# Patient Record
Sex: MALE | Race: WHITE | NOT HISPANIC OR LATINO | Employment: UNEMPLOYED | URBAN - METROPOLITAN AREA
[De-identification: names, ages, dates, MRNs, and addresses within clinical notes are randomized per-mention and may not be internally consistent; named-entity substitution may affect disease eponyms.]

---

## 2017-11-23 ENCOUNTER — HOSPITAL ENCOUNTER (EMERGENCY)
Facility: HOSPITAL | Age: 42
Discharge: HOME | End: 2017-11-23
Attending: EMERGENCY MEDICINE | Admitting: EMERGENCY MEDICINE

## 2017-11-23 VITALS
HEART RATE: 79 BPM | DIASTOLIC BLOOD PRESSURE: 80 MMHG | SYSTOLIC BLOOD PRESSURE: 106 MMHG | BODY MASS INDEX: 25.11 KG/M2 | TEMPERATURE: 97.7 F | RESPIRATION RATE: 20 BRPM | WEIGHT: 160 LBS | OXYGEN SATURATION: 96 % | HEIGHT: 67 IN

## 2017-11-23 DIAGNOSIS — F19.10 IV DRUG ABUSE (HCC): ICD-10-CM

## 2017-11-23 DIAGNOSIS — M79.89 SWELLING OF LEFT HAND: Primary | ICD-10-CM

## 2017-11-23 DIAGNOSIS — L03.114 CELLULITIS OF LEFT HAND: ICD-10-CM

## 2017-11-23 LAB
ALBUMIN SERPL-MCNC: 4.4 G/DL (ref 3.2–4.8)
ALBUMIN/GLOB SERPL: 1.3 G/DL (ref 1.5–2.5)
ALP SERPL-CCNC: 146 U/L (ref 25–100)
ALT SERPL W P-5'-P-CCNC: 106 U/L (ref 7–40)
ANION GAP SERPL CALCULATED.3IONS-SCNC: -2 MMOL/L (ref 3–11)
AST SERPL-CCNC: 67 U/L (ref 0–33)
BASOPHILS # BLD AUTO: 0.04 10*3/MM3 (ref 0–0.2)
BASOPHILS NFR BLD AUTO: 0.4 % (ref 0–1)
BILIRUB SERPL-MCNC: 0.3 MG/DL (ref 0.3–1.2)
BUN BLD-MCNC: 11 MG/DL (ref 9–23)
BUN/CREAT SERPL: 13.8 (ref 7–25)
CALCIUM SPEC-SCNC: 9.4 MG/DL (ref 8.7–10.4)
CHLORIDE SERPL-SCNC: 107 MMOL/L (ref 99–109)
CO2 SERPL-SCNC: 31 MMOL/L (ref 20–31)
CREAT BLD-MCNC: 0.8 MG/DL (ref 0.6–1.3)
DEPRECATED RDW RBC AUTO: 42.8 FL (ref 37–54)
EOSINOPHIL # BLD AUTO: 0.39 10*3/MM3 (ref 0–0.3)
EOSINOPHIL NFR BLD AUTO: 3.9 % (ref 0–3)
ERYTHROCYTE [DISTWIDTH] IN BLOOD BY AUTOMATED COUNT: 13.3 % (ref 11.3–14.5)
GFR SERPL CREATININE-BSD FRML MDRD: 106 ML/MIN/1.73
GLOBULIN UR ELPH-MCNC: 3.5 GM/DL
GLUCOSE BLD-MCNC: 83 MG/DL (ref 70–100)
HCT VFR BLD AUTO: 44.5 % (ref 38.9–50.9)
HGB BLD-MCNC: 15.8 G/DL (ref 13.1–17.5)
IMM GRANULOCYTES # BLD: 0.03 10*3/MM3 (ref 0–0.03)
IMM GRANULOCYTES NFR BLD: 0.3 % (ref 0–0.6)
LYMPHOCYTES # BLD AUTO: 2.58 10*3/MM3 (ref 0.6–4.8)
LYMPHOCYTES NFR BLD AUTO: 25.5 % (ref 24–44)
MCH RBC QN AUTO: 31.3 PG (ref 27–31)
MCHC RBC AUTO-ENTMCNC: 35.5 G/DL (ref 32–36)
MCV RBC AUTO: 88.3 FL (ref 80–99)
MONOCYTES # BLD AUTO: 1.42 10*3/MM3 (ref 0–1)
MONOCYTES NFR BLD AUTO: 14.1 % (ref 0–12)
NEUTROPHILS # BLD AUTO: 5.64 10*3/MM3 (ref 1.5–8.3)
NEUTROPHILS NFR BLD AUTO: 55.8 % (ref 41–71)
PLATELET # BLD AUTO: 272 10*3/MM3 (ref 150–450)
PMV BLD AUTO: 9.3 FL (ref 6–12)
POTASSIUM BLD-SCNC: 3.8 MMOL/L (ref 3.5–5.5)
PROT SERPL-MCNC: 7.9 G/DL (ref 5.7–8.2)
RBC # BLD AUTO: 5.04 10*6/MM3 (ref 4.2–5.76)
SODIUM BLD-SCNC: 136 MMOL/L (ref 132–146)
WBC NRBC COR # BLD: 10.1 10*3/MM3 (ref 3.5–10.8)

## 2017-11-23 PROCEDURE — 87040 BLOOD CULTURE FOR BACTERIA: CPT | Performed by: EMERGENCY MEDICINE

## 2017-11-23 PROCEDURE — 96366 THER/PROPH/DIAG IV INF ADDON: CPT

## 2017-11-23 PROCEDURE — 25010000002 TDAP 5-2.5-18.5 LF-MCG/0.5 SUSPENSION: Performed by: NURSE PRACTITIONER

## 2017-11-23 PROCEDURE — 85025 COMPLETE CBC W/AUTO DIFF WBC: CPT | Performed by: EMERGENCY MEDICINE

## 2017-11-23 PROCEDURE — 96365 THER/PROPH/DIAG IV INF INIT: CPT

## 2017-11-23 PROCEDURE — 99284 EMERGENCY DEPT VISIT MOD MDM: CPT

## 2017-11-23 PROCEDURE — 96375 TX/PRO/DX INJ NEW DRUG ADDON: CPT

## 2017-11-23 PROCEDURE — 90471 IMMUNIZATION ADMIN: CPT | Performed by: NURSE PRACTITIONER

## 2017-11-23 PROCEDURE — 80053 COMPREHEN METABOLIC PANEL: CPT | Performed by: EMERGENCY MEDICINE

## 2017-11-23 PROCEDURE — 25010000002 VANCOMYCIN 10 G RECONSTITUTED SOLUTION: Performed by: EMERGENCY MEDICINE

## 2017-11-23 PROCEDURE — 90715 TDAP VACCINE 7 YRS/> IM: CPT | Performed by: NURSE PRACTITIONER

## 2017-11-23 PROCEDURE — 25010000002 CEFTRIAXONE PER 250 MG: Performed by: EMERGENCY MEDICINE

## 2017-11-23 RX ORDER — METHADONE HYDROCHLORIDE 10 MG/1
10 TABLET ORAL EVERY 6 HOURS PRN
COMMUNITY
End: 2023-03-06

## 2017-11-23 RX ORDER — SODIUM CHLORIDE 0.9 % (FLUSH) 0.9 %
10 SYRINGE (ML) INJECTION AS NEEDED
Status: DISCONTINUED | OUTPATIENT
Start: 2017-11-23 | End: 2017-11-23 | Stop reason: HOSPADM

## 2017-11-23 RX ORDER — SULFAMETHOXAZOLE AND TRIMETHOPRIM 800; 160 MG/1; MG/1
2 TABLET ORAL 2 TIMES DAILY
Qty: 40 TABLET | Refills: 0 | Status: SHIPPED | OUTPATIENT
Start: 2017-11-23 | End: 2023-03-06

## 2017-11-23 RX ORDER — CEFTRIAXONE SODIUM 1 G/50ML
1 INJECTION, SOLUTION INTRAVENOUS ONCE
Status: COMPLETED | OUTPATIENT
Start: 2017-11-23 | End: 2017-11-23

## 2017-11-23 RX ORDER — CEPHALEXIN 500 MG/1
500 CAPSULE ORAL 4 TIMES DAILY
Qty: 40 CAPSULE | Refills: 0 | Status: SHIPPED | OUTPATIENT
Start: 2017-11-23 | End: 2023-03-06

## 2017-11-23 RX ORDER — VANCOMYCIN/0.9 % SOD CHLORIDE 1.5G/250ML
20 PLASTIC BAG, INJECTION (ML) INTRAVENOUS ONCE
Status: COMPLETED | OUTPATIENT
Start: 2017-11-23 | End: 2017-11-23

## 2017-11-23 RX ADMIN — VANCOMYCIN HYDROCHLORIDE 1500 MG: 10 INJECTION, POWDER, LYOPHILIZED, FOR SOLUTION INTRAVENOUS at 18:40

## 2017-11-23 RX ADMIN — TETANUS TOXOID, REDUCED DIPHTHERIA TOXOID AND ACELLULAR PERTUSSIS VACCINE, ADSORBED 0.5 ML: 5; 2.5; 8; 8; 2.5 SUSPENSION INTRAMUSCULAR at 18:44

## 2017-11-23 RX ADMIN — CEFTRIAXONE SODIUM 1 G: 1 INJECTION, SOLUTION INTRAVENOUS at 20:33

## 2017-11-23 NOTE — ED PROVIDER NOTES
Subjective   HPI Comments: Left hand redness and swelling for 2 days. Burnt his left FA on a car but also does IV drugs with most recent being heroin last week.    No cp or soa. No fever.    Patient is a 42 y.o. male presenting with upper extremity pain.   Upper Extremity Issue   Location:  Arm  Arm location:  L arm  Pain details:     Duration:  2 days    Timing:  Constant    Progression:  Worsening  Tetanus status:  Out of date  Prior injury to area:  No  Relieved by:  Nothing  Worsened by:  Nothing  Ineffective treatments:  None tried  Associated symptoms: swelling    Associated symptoms: no decreased range of motion and no fever        Review of Systems   Constitutional: Negative for fever.   All other systems reviewed and are negative.      Past Medical History:   Diagnosis Date   • Hepatitis C    • Hodgkin's lymphoma        Allergies   Allergen Reactions   • Penicillins Hives       Past Surgical History:   Procedure Laterality Date   • CHOLECYSTECTOMY         History reviewed. No pertinent family history.    Social History     Social History   • Marital status:      Spouse name: N/A   • Number of children: N/A   • Years of education: N/A     Social History Main Topics   • Smoking status: Heavy Tobacco Smoker     Packs/day: 1.50   • Smokeless tobacco: Never Used   • Alcohol use No   • Drug use: No   • Sexual activity: Not Asked     Other Topics Concern   • None     Social History Narrative   • None           Objective   Physical Exam   Constitutional: He is oriented to person, place, and time. He appears well-developed and well-nourished.   HENT:   Head: Normocephalic and atraumatic.   Right Ear: External ear normal.   Left Ear: External ear normal.   Nose: Nose normal.   Mouth/Throat: Oropharynx is clear and moist.   Eyes: Conjunctivae and EOM are normal. Pupils are equal, round, and reactive to light.   Neck: Normal range of motion. Neck supple.   Cardiovascular: Normal rate, regular rhythm, normal  heart sounds and intact distal pulses.    Pulmonary/Chest: Effort normal and breath sounds normal.   Abdominal: Soft. Bowel sounds are normal.   Musculoskeletal: Normal range of motion.        Left hand: He exhibits tenderness. He exhibits normal range of motion.   Slight left hand swelling with full rom. No pain with movement. Has an abrasion to her left FA as well as several puncture wounds to her left AC and wrist cw IVDU.   Neurological: He is alert and oriented to person, place, and time.   Skin: Skin is warm and dry.   Psychiatric: He has a normal mood and affect. His behavior is normal. Judgment normal.       Procedures         ED Course  ED Course   Comment By Time   Well aware of the ss of worsening condition. All thankful and agreeable. Advised to stop doing drugs. LELA Leslie 11/23 2018                  [facility]    Final diagnoses:   Swelling of left hand   Cellulitis of left hand   IV drug abuse            LELA Leslie  11/23/17 2023

## 2017-11-24 NOTE — DISCHARGE INSTRUCTIONS
Follow up with one of the [facility] physician groups below to setup primary care. If you have trouble following up, please call Ginger Ramos, our transitional care nurse, at [phone].    (Dr. Celestin, Dr. Patterson, Dr. Brennan, and Dr. Salmon.)  [facility], Primary Care, [phone], [address], [place] [address]    [facility], Primary Care, [phone], [address], [address] [place], [address] [facility]) [facility], Primary Care, [phone], [address] [place], [address] [facility], Primary Care, [phone], [address] [place], [address]     [place] [facility], Primary Care, [phone], [address] [place], [address]    [facility], Primary Care, [phone], [address]    [place], [address] [facility], Primary Care, [phone], [address], [address] [place], [address] [facility], Primary Care, [phone], [address] [place], [address] [facility]) [facility], Primary Care, [phone], [address], [address] [place], [address] [facility], Primary Care, [phone], [address], [address] [place], [address] [facility], Primary Care, [phone], [address], [place], [address]     [facility], Primary Care, [phone], [address] [place] [address]    Follow up with one of the physician centers below to setup primary care.    [facility], [phone],  [address], [address], [place], [facility]    [facility], [phone], [address] [facility], [address] [place] [address], [phone], [address] [place], [facility];     [facility], [phone], [address] [place] [address]

## 2017-11-28 LAB
BACTERIA SPEC AEROBE CULT: NORMAL
BACTERIA SPEC AEROBE CULT: NORMAL

## 2022-12-27 ENCOUNTER — APPOINTMENT (OUTPATIENT)
Dept: GENERAL RADIOLOGY | Facility: HOSPITAL | Age: 47
End: 2022-12-27

## 2022-12-27 ENCOUNTER — HOSPITAL ENCOUNTER (EMERGENCY)
Facility: HOSPITAL | Age: 47
Discharge: HOME | End: 2022-12-27
Attending: EMERGENCY MEDICINE | Admitting: EMERGENCY MEDICINE

## 2022-12-27 VITALS
RESPIRATION RATE: 20 BRPM | OXYGEN SATURATION: 97 % | BODY MASS INDEX: 24.44 KG/M2 | HEIGHT: 69 IN | WEIGHT: 165 LBS | TEMPERATURE: 98.1 F | SYSTOLIC BLOOD PRESSURE: 90 MMHG | HEART RATE: 90 BPM | DIASTOLIC BLOOD PRESSURE: 67 MMHG

## 2022-12-27 DIAGNOSIS — R07.9 CHEST PAIN, UNSPECIFIED TYPE: Primary | ICD-10-CM

## 2022-12-27 LAB
ALBUMIN SERPL-MCNC: 3.5 G/DL (ref 3.5–5.2)
ALBUMIN/GLOB SERPL: 0.9 G/DL
ALP SERPL-CCNC: 119 U/L (ref 39–117)
ALT SERPL W P-5'-P-CCNC: 158 U/L (ref 1–41)
ANION GAP SERPL CALCULATED.3IONS-SCNC: 9 MMOL/L (ref 5–15)
AST SERPL-CCNC: 123 U/L (ref 1–40)
BASOPHILS # BLD AUTO: 0.1 10*3/MM3 (ref 0–0.2)
BASOPHILS NFR BLD AUTO: 1.1 % (ref 0–1.5)
BILIRUB SERPL-MCNC: 0.2 MG/DL (ref 0–1.2)
BUN SERPL-MCNC: 12 MG/DL (ref 6–20)
BUN/CREAT SERPL: 19.4 (ref 7–25)
CALCIUM SPEC-SCNC: 9.3 MG/DL (ref 8.6–10.5)
CHLORIDE SERPL-SCNC: 105 MMOL/L (ref 98–107)
CO2 SERPL-SCNC: 24 MMOL/L (ref 22–29)
CREAT SERPL-MCNC: 0.62 MG/DL (ref 0.76–1.27)
D DIMER PPP FEU-MCNC: <0.27 MCGFEU/ML (ref 0–0.5)
DEPRECATED RDW RBC AUTO: 39.6 FL (ref 37–54)
EGFRCR SERPLBLD CKD-EPI 2021: 118.6 ML/MIN/1.73
EOSINOPHIL # BLD AUTO: 0.89 10*3/MM3 (ref 0–0.4)
EOSINOPHIL NFR BLD AUTO: 9.6 % (ref 0.3–6.2)
ERYTHROCYTE [DISTWIDTH] IN BLOOD BY AUTOMATED COUNT: 12.2 % (ref 12.3–15.4)
GLOBULIN UR ELPH-MCNC: 3.9 GM/DL
GLUCOSE SERPL-MCNC: 119 MG/DL (ref 65–99)
HCT VFR BLD AUTO: 39.6 % (ref 37.5–51)
HGB BLD-MCNC: 12.6 G/DL (ref 13–17.7)
HOLD SPECIMEN: NORMAL
IMM GRANULOCYTES # BLD AUTO: 0.05 10*3/MM3 (ref 0–0.05)
IMM GRANULOCYTES NFR BLD AUTO: 0.5 % (ref 0–0.5)
LIPASE SERPL-CCNC: 24 U/L (ref 13–60)
LYMPHOCYTES # BLD AUTO: 2.75 10*3/MM3 (ref 0.7–3.1)
LYMPHOCYTES NFR BLD AUTO: 29.6 % (ref 19.6–45.3)
MCH RBC QN AUTO: 28.2 PG (ref 26.6–33)
MCHC RBC AUTO-ENTMCNC: 31.8 G/DL (ref 31.5–35.7)
MCV RBC AUTO: 88.6 FL (ref 79–97)
MONOCYTES # BLD AUTO: 1.01 10*3/MM3 (ref 0.1–0.9)
MONOCYTES NFR BLD AUTO: 10.9 % (ref 5–12)
NEUTROPHILS NFR BLD AUTO: 4.49 10*3/MM3 (ref 1.7–7)
NEUTROPHILS NFR BLD AUTO: 48.3 % (ref 42.7–76)
NRBC BLD AUTO-RTO: 0 /100 WBC (ref 0–0.2)
NT-PROBNP SERPL-MCNC: 32.7 PG/ML (ref 0–450)
PLATELET # BLD AUTO: 367 10*3/MM3 (ref 140–450)
PMV BLD AUTO: 8.8 FL (ref 6–12)
POTASSIUM SERPL-SCNC: 4.6 MMOL/L (ref 3.5–5.2)
PROT SERPL-MCNC: 7.4 G/DL (ref 6–8.5)
QT INTERVAL: 368 MS
QT INTERVAL: 410 MS
QTC INTERVAL: 442 MS
QTC INTERVAL: 461 MS
RBC # BLD AUTO: 4.47 10*6/MM3 (ref 4.14–5.8)
SODIUM SERPL-SCNC: 138 MMOL/L (ref 136–145)
TROPONIN T SERPL-MCNC: <0.01 NG/ML (ref 0–0.03)
TROPONIN T SERPL-MCNC: <0.01 NG/ML (ref 0–0.03)
WBC NRBC COR # BLD: 9.29 10*3/MM3 (ref 3.4–10.8)
WHOLE BLOOD HOLD COAG: NORMAL
WHOLE BLOOD HOLD SPECIMEN: NORMAL

## 2022-12-27 PROCEDURE — 83690 ASSAY OF LIPASE: CPT | Performed by: EMERGENCY MEDICINE

## 2022-12-27 PROCEDURE — 85025 COMPLETE CBC W/AUTO DIFF WBC: CPT | Performed by: EMERGENCY MEDICINE

## 2022-12-27 PROCEDURE — 85379 FIBRIN DEGRADATION QUANT: CPT | Performed by: EMERGENCY MEDICINE

## 2022-12-27 PROCEDURE — 83880 ASSAY OF NATRIURETIC PEPTIDE: CPT | Performed by: EMERGENCY MEDICINE

## 2022-12-27 PROCEDURE — 71045 X-RAY EXAM CHEST 1 VIEW: CPT

## 2022-12-27 PROCEDURE — 84484 ASSAY OF TROPONIN QUANT: CPT | Performed by: EMERGENCY MEDICINE

## 2022-12-27 PROCEDURE — 99284 EMERGENCY DEPT VISIT MOD MDM: CPT

## 2022-12-27 PROCEDURE — 36415 COLL VENOUS BLD VENIPUNCTURE: CPT

## 2022-12-27 PROCEDURE — 80053 COMPREHEN METABOLIC PANEL: CPT | Performed by: EMERGENCY MEDICINE

## 2022-12-27 PROCEDURE — 93005 ELECTROCARDIOGRAM TRACING: CPT | Performed by: EMERGENCY MEDICINE

## 2022-12-27 RX ORDER — BUPRENORPHINE HYDROCHLORIDE AND NALOXONE HYDROCHLORIDE DIHYDRATE 8; 2 MG/1; MG/1
1 TABLET SUBLINGUAL DAILY
COMMUNITY
End: 2023-03-06

## 2022-12-27 RX ORDER — SODIUM CHLORIDE 0.9 % (FLUSH) 0.9 %
10 SYRINGE (ML) INJECTION AS NEEDED
Status: DISCONTINUED | OUTPATIENT
Start: 2022-12-27 | End: 2022-12-27 | Stop reason: HOSPADM

## 2022-12-27 RX ADMIN — SODIUM CHLORIDE 1000 ML: 9 INJECTION, SOLUTION INTRAVENOUS at 13:57

## 2022-12-27 NOTE — ED PROVIDER NOTES
"Subjective   History of Present Illness  47-year-old male presents for evaluation of chest pain and shortness of breath.  Of note, the patient has a history of hypertension, hyperlipidemia, and is a smoker.  He is currently homeless but states that he has a safe place to stay.  He notes that he had a clean heart cath at an outside facility within the last 3 months.  Since yesterday he has been experiencing intermittent episodes of chest pain and shortness of breath.  He describes the chest pain as a \"tightness.\"  He denies any accompanying cough or fever.  No nausea or vomiting.  No diaphoresis.  He received aspirin and nitroglycerin from EMS prior to arrival.  He denies any active chest pain at this time.  He is currently requesting something to eat.        Review of Systems   Respiratory: Positive for shortness of breath.    Cardiovascular: Positive for chest pain.   All other systems reviewed and are negative.      Past Medical History:   Diagnosis Date   • Hepatitis C    • Hodgkin's lymphoma (HCC)        Allergies   Allergen Reactions   • Penicillins Hives       Past Surgical History:   Procedure Laterality Date   • CHOLECYSTECTOMY         History reviewed. No pertinent family history.    Social History     Socioeconomic History   • Marital status:    Tobacco Use   • Smoking status: Heavy Smoker     Packs/day: 1.50     Types: Cigarettes   • Smokeless tobacco: Never   Substance and Sexual Activity   • Alcohol use: No   • Drug use: No           Objective   Physical Exam  Vitals and nursing note reviewed.   Constitutional:       General: He is not in acute distress.     Appearance: He is well-developed. He is not diaphoretic.      Comments: Nontoxic-appearing male   HENT:      Head: Normocephalic and atraumatic.   Neck:      Vascular: No JVD.   Cardiovascular:      Rate and Rhythm: Normal rate and regular rhythm.      Heart sounds: Normal heart sounds. No murmur heard.    No friction rub. No gallop. "   Pulmonary:      Effort: Pulmonary effort is normal. No respiratory distress.      Breath sounds: Normal breath sounds. No wheezing or rales.   Abdominal:      General: Bowel sounds are normal. There is no distension.      Palpations: Abdomen is soft. There is no mass.      Tenderness: There is no abdominal tenderness. There is no guarding.      Comments: No focal abdominal tenderness, no peritoneal signs, no pain out of proportion to exam   Musculoskeletal:         General: Normal range of motion.      Cervical back: Normal range of motion.      Right lower leg: No edema.      Left lower leg: No edema.   Skin:     General: Skin is warm and dry.      Coloration: Skin is not pale.      Findings: No erythema or rash.   Neurological:      Mental Status: He is alert and oriented to person, place, and time.   Psychiatric:         Thought Content: Thought content normal.         Judgment: Judgment normal.         Procedures           ED Course  ED Course as of 12/27/22 1650   Tue Dec 27, 2022   1143 47-year-old male presents for evaluation of chest pain and shortness of breath.  Of note, the patient has a history of hypertension, hyperlipidemia and smoking.  He notes that he had a clean heart cath at an outside facility within the last 3 months.  He is currently homeless.  Since yesterday has been experiencing intermittent episodes of chest pain and shortness of breath, prompting his visit to the emergency department via EMS today.  He received aspirin and nitroglycerin from EMS prior to arrival.  In the ED, the patient is nontoxic-appearing.  Benign exam.  We will obtain labs and imaging, and we will reassess following initial interventions. [DD]   1150 Initial EKG revealed normal sinus rhythm with a heart rate of 87 and no ST segments suggestive of or concerning for ischemia. [DD]   1309 HEART score of 3. [DD]   1405 Low risk Well's and D-dimer is negative. [DD]   1435 Upon reevaluation, the patient looks and feels  well.  Repeat troponin/EKG negative/unchanged.  Doubt ACS, PE, dissection, or emergent cardiothoracic process at this time based on exam, history, clinical presentation, gestalt, objective findings in the ED, and risk stratification.  HEART score of 3.  The patient will follow up with the chest pain clinic within the next 72 hours for further outpatient work-up and evaluation.  Agreeable with plan and given appropriate strict return precautions.     [DD]      ED Course User Index  [DD] Pancho Monroe MD                                       Recent Results (from the past 24 hour(s))   ECG 12 Lead Chest Pain    Collection Time: 12/27/22 11:45 AM   Result Value Ref Range    QT Interval 368 ms    QTC Interval 442 ms   Comprehensive Metabolic Panel    Collection Time: 12/27/22 12:02 PM    Specimen: Blood   Result Value Ref Range    Glucose 119 (H) 65 - 99 mg/dL    BUN 12 6 - 20 mg/dL    Creatinine 0.62 (L) 0.76 - 1.27 mg/dL    Sodium 138 136 - 145 mmol/L    Potassium 4.6 3.5 - 5.2 mmol/L    Chloride 105 98 - 107 mmol/L    CO2 24.0 22.0 - 29.0 mmol/L    Calcium 9.3 8.6 - 10.5 mg/dL    Total Protein 7.4 6.0 - 8.5 g/dL    Albumin 3.5 3.5 - 5.2 g/dL    ALT (SGPT) 158 (H) 1 - 41 U/L    AST (SGOT) 123 (H) 1 - 40 U/L    Alkaline Phosphatase 119 (H) 39 - 117 U/L    Total Bilirubin 0.2 0.0 - 1.2 mg/dL    Globulin 3.9 gm/dL    A/G Ratio 0.9 g/dL    BUN/Creatinine Ratio 19.4 7.0 - 25.0    Anion Gap 9.0 5.0 - 15.0 mmol/L    eGFR 118.6 >60.0 mL/min/1.73   Lipase    Collection Time: 12/27/22 12:02 PM    Specimen: Blood   Result Value Ref Range    Lipase 24 13 - 60 U/L   BNP    Collection Time: 12/27/22 12:02 PM    Specimen: Blood   Result Value Ref Range    proBNP 32.7 0.0 - 450.0 pg/mL   D-dimer, Quantitative    Collection Time: 12/27/22 12:02 PM    Specimen: Blood   Result Value Ref Range    D-Dimer, Quantitative <0.27 0.00 - 0.50 MCGFEU/mL   Troponin    Collection Time: 12/27/22 12:02 PM    Specimen: Blood   Result Value Ref  Range    Troponin T <0.010 0.000 - 0.030 ng/mL   CBC Auto Differential    Collection Time: 12/27/22 12:02 PM    Specimen: Blood   Result Value Ref Range    WBC 9.29 3.40 - 10.80 10*3/mm3    RBC 4.47 4.14 - 5.80 10*6/mm3    Hemoglobin 12.6 (L) 13.0 - 17.7 g/dL    Hematocrit 39.6 37.5 - 51.0 %    MCV 88.6 79.0 - 97.0 fL    MCH 28.2 26.6 - 33.0 pg    MCHC 31.8 31.5 - 35.7 g/dL    RDW 12.2 (L) 12.3 - 15.4 %    RDW-SD 39.6 37.0 - 54.0 fl    MPV 8.8 6.0 - 12.0 fL    Platelets 367 140 - 450 10*3/mm3    Neutrophil % 48.3 42.7 - 76.0 %    Lymphocyte % 29.6 19.6 - 45.3 %    Monocyte % 10.9 5.0 - 12.0 %    Eosinophil % 9.6 (H) 0.3 - 6.2 %    Basophil % 1.1 0.0 - 1.5 %    Immature Grans % 0.5 0.0 - 0.5 %    Neutrophils, Absolute 4.49 1.70 - 7.00 10*3/mm3    Lymphocytes, Absolute 2.75 0.70 - 3.10 10*3/mm3    Monocytes, Absolute 1.01 (H) 0.10 - 0.90 10*3/mm3    Eosinophils, Absolute 0.89 (H) 0.00 - 0.40 10*3/mm3    Basophils, Absolute 0.10 0.00 - 0.20 10*3/mm3    Immature Grans, Absolute 0.05 0.00 - 0.05 10*3/mm3    nRBC 0.0 0.0 - 0.2 /100 WBC   Green Top (Gel)    Collection Time: 12/27/22 12:02 PM   Result Value Ref Range    Extra Tube Hold for add-ons.    Lavender Top    Collection Time: 12/27/22 12:02 PM   Result Value Ref Range    Extra Tube hold for add-on    Gold Top - SST    Collection Time: 12/27/22 12:02 PM   Result Value Ref Range    Extra Tube Hold for add-ons.    Gray Top    Collection Time: 12/27/22 12:02 PM   Result Value Ref Range    Extra Tube Hold for add-ons.    Light Blue Top    Collection Time: 12/27/22 12:02 PM   Result Value Ref Range    Extra Tube Hold for add-ons.    ECG 12 Lead Chest Pain    Collection Time: 12/27/22  2:05 PM   Result Value Ref Range    QT Interval 410 ms    QTC Interval 461 ms   Troponin    Collection Time: 12/27/22  2:06 PM    Specimen: Blood   Result Value Ref Range    Troponin T <0.010 0.000 - 0.030 ng/mL     Note: In addition to lab results from this visit, the labs listed above may  include labs taken at another facility or during a different encounter within the last 24 hours. Please correlate lab times with ED admission and discharge times for further clarification of the services performed during this visit.    XR Chest 1 View   Final Result   IMPRESSION :    Increased patchy opacity in the retrocardiac aspect of the left base   which could represent atelectasis or pneumonia[       This report was finalized on 12/27/2022 12:51 PM by Harrison Donis.            Vitals:    12/27/22 1159 12/27/22 1219 12/27/22 1241 12/27/22 1300   BP: 93/55 93/62 96/56 90/67   Patient Position:       Pulse: 94 89 84 90   Resp:       Temp:       TempSrc:       SpO2: 98% 97% 98% 97%   Weight:       Height:         Medications   sodium chloride 0.9 % flush 10 mL (has no administration in time range)   sodium chloride 0.9 % flush 10 mL (has no administration in time range)   sodium chloride 0.9 % bolus 1,000 mL (0 mL Intravenous Stopped 12/27/22 1454)     ECG/EMG Results (last 24 hours)     ** No results found for the last 24 hours. **        ECG 12 Lead Chest Pain   Final Result   Test Reason : Chest Pain   Blood Pressure :   */*   mmHG   Vent. Rate :  76 BPM     Atrial Rate :  76 BPM      P-R Int : 136 ms          QRS Dur :  96 ms       QT Int : 410 ms       P-R-T Axes :  63  39  43 degrees      QTc Int : 461 ms      Normal sinus rhythm   Voltage criteria for left ventricular hypertrophy   Abnormal ECG   When compared with ECG of 27-DEC-2022 11:45, (Unconfirmed)   No significant change was found   Confirmed by MD Monroe Michael ([id number]) on 12/27/2022 2:55:37 PM      Referred By: FERNANDO           Confirmed By: Holden Monroe MD      ECG 12 Lead Chest Pain   Final Result   Test Reason : Chest Pain   Blood Pressure :   */*   mmHG   Vent. Rate :  87 BPM     Atrial Rate :  87 BPM      P-R Int : 130 ms          QRS Dur :  88 ms       QT Int : 368 ms       P-R-T Axes :  67  57  58 degrees      QTc Int : 442 ms      Normal  sinus rhythm   Possible Left atrial enlargement   Left ventricular hypertrophy   Abnormal ECG   No previous ECGs available   Confirmed by MD Monroe Michael ([id number]) on 12/27/2022 2:55:06 PM      Referred By: EDMD           Confirmed By: Holden Monroe MD      ECG 12 Lead Chest Pain    (Results Pending)              MDM    Final diagnoses:   Chest pain, unspecified type       ED Disposition  ED Disposition     ED Disposition   Discharge    Condition   Stable    Comment   --             [facility]  [address]  [facility] [address] [phone]  In 3 days           Medication List      No changes were made to your prescriptions during this visit.          Pancho Monroe MD  12/27/22 9427

## 2022-12-29 ENCOUNTER — PATIENT OUTREACH (OUTPATIENT)
Dept: CASE MANAGEMENT | Facility: OTHER | Age: 47
End: 2022-12-29

## 2022-12-29 NOTE — OUTREACH NOTE
AMBULATORY CASE MANAGEMENT NOTE    Name and Relationship of Patient/Support Person: Jayne Stover - Emergency Contact    Patient Outreach    Second attempt made to reach patient by phone, since his ED visit 12/27/22 with chest pain, shortness of breath.  Wife answered the only phone number listed for patient. Explained role of RN-ACM, and contact info.  Said she was out with her kids currently,  was not with her.  Asked her to give patient RN-ACM phone number and have him call back.  Wife agreed.     JAY JAY THIBODEAUX  Ambulatory Case Management    12/29/2022, 16:38 EST

## 2023-06-12 ENCOUNTER — HOSPITAL ENCOUNTER (EMERGENCY)
Facility: HOSPITAL | Age: 48
Discharge: HOME | End: 2023-06-12
Attending: EMERGENCY MEDICINE | Admitting: EMERGENCY MEDICINE
Payer: COMMERCIAL

## 2023-06-12 ENCOUNTER — APPOINTMENT (OUTPATIENT)
Dept: GENERAL RADIOLOGY | Facility: HOSPITAL | Age: 48
End: 2023-06-12
Payer: COMMERCIAL

## 2023-06-12 VITALS
TEMPERATURE: 99.3 F | HEART RATE: 68 BPM | BODY MASS INDEX: 28.25 KG/M2 | HEIGHT: 67 IN | DIASTOLIC BLOOD PRESSURE: 74 MMHG | OXYGEN SATURATION: 95 % | RESPIRATION RATE: 18 BRPM | SYSTOLIC BLOOD PRESSURE: 104 MMHG | WEIGHT: 180 LBS

## 2023-06-12 DIAGNOSIS — R07.9 CHEST PAIN, UNSPECIFIED TYPE: Primary | ICD-10-CM

## 2023-06-12 DIAGNOSIS — Z72.0 TOBACCO USE: ICD-10-CM

## 2023-06-12 LAB
ALBUMIN SERPL-MCNC: 4 G/DL (ref 3.5–5.2)
ALBUMIN/GLOB SERPL: 1.4 G/DL
ALP SERPL-CCNC: 77 U/L (ref 39–117)
ALT SERPL W P-5'-P-CCNC: 14 U/L (ref 1–41)
ANION GAP SERPL CALCULATED.3IONS-SCNC: 9 MMOL/L (ref 5–15)
AST SERPL-CCNC: 21 U/L (ref 1–40)
BASOPHILS # BLD AUTO: 0.05 10*3/MM3 (ref 0–0.2)
BASOPHILS NFR BLD AUTO: 0.8 % (ref 0–1.5)
BILIRUB SERPL-MCNC: 0.2 MG/DL (ref 0–1.2)
BUN SERPL-MCNC: 9 MG/DL (ref 6–20)
BUN/CREAT SERPL: 9.9 (ref 7–25)
CALCIUM SPEC-SCNC: 9.5 MG/DL (ref 8.6–10.5)
CHLORIDE SERPL-SCNC: 105 MMOL/L (ref 98–107)
CO2 SERPL-SCNC: 26 MMOL/L (ref 22–29)
CREAT SERPL-MCNC: 0.91 MG/DL (ref 0.76–1.27)
DEPRECATED RDW RBC AUTO: 38.4 FL (ref 37–54)
EGFRCR SERPLBLD CKD-EPI 2021: 104 ML/MIN/1.73
EOSINOPHIL # BLD AUTO: 0.35 10*3/MM3 (ref 0–0.4)
EOSINOPHIL NFR BLD AUTO: 5.6 % (ref 0.3–6.2)
ERYTHROCYTE [DISTWIDTH] IN BLOOD BY AUTOMATED COUNT: 12.1 % (ref 12.3–15.4)
GEN 5 2HR TROPONIN T REFLEX: <6 NG/L
GLOBULIN UR ELPH-MCNC: 2.9 GM/DL
GLUCOSE SERPL-MCNC: 102 MG/DL (ref 65–99)
HCT VFR BLD AUTO: 39.2 % (ref 37.5–51)
HGB BLD-MCNC: 13.2 G/DL (ref 13–17.7)
HOLD SPECIMEN: NORMAL
IMM GRANULOCYTES # BLD AUTO: 0.01 10*3/MM3 (ref 0–0.05)
IMM GRANULOCYTES NFR BLD AUTO: 0.2 % (ref 0–0.5)
LIPASE SERPL-CCNC: 23 U/L (ref 13–60)
LYMPHOCYTES # BLD AUTO: 2.15 10*3/MM3 (ref 0.7–3.1)
LYMPHOCYTES NFR BLD AUTO: 34.3 % (ref 19.6–45.3)
MCH RBC QN AUTO: 29.1 PG (ref 26.6–33)
MCHC RBC AUTO-ENTMCNC: 33.7 G/DL (ref 31.5–35.7)
MCV RBC AUTO: 86.3 FL (ref 79–97)
MONOCYTES # BLD AUTO: 0.63 10*3/MM3 (ref 0.1–0.9)
MONOCYTES NFR BLD AUTO: 10.1 % (ref 5–12)
NEUTROPHILS NFR BLD AUTO: 3.07 10*3/MM3 (ref 1.7–7)
NEUTROPHILS NFR BLD AUTO: 49 % (ref 42.7–76)
NRBC BLD AUTO-RTO: 0 /100 WBC (ref 0–0.2)
NT-PROBNP SERPL-MCNC: 38.6 PG/ML (ref 0–450)
PLATELET # BLD AUTO: 237 10*3/MM3 (ref 140–450)
PMV BLD AUTO: 9.6 FL (ref 6–12)
POTASSIUM SERPL-SCNC: 4.4 MMOL/L (ref 3.5–5.2)
PROT SERPL-MCNC: 6.9 G/DL (ref 6–8.5)
QT INTERVAL: 390 MS
QT INTERVAL: 420 MS
QTC INTERVAL: 456 MS
QTC INTERVAL: 458 MS
RBC # BLD AUTO: 4.54 10*6/MM3 (ref 4.14–5.8)
SODIUM SERPL-SCNC: 140 MMOL/L (ref 136–145)
TROPONIN T DELTA: NORMAL
TROPONIN T SERPL HS-MCNC: <6 NG/L
WBC NRBC COR # BLD: 6.26 10*3/MM3 (ref 3.4–10.8)
WHOLE BLOOD HOLD COAG: NORMAL
WHOLE BLOOD HOLD SPECIMEN: NORMAL

## 2023-06-12 PROCEDURE — 99284 EMERGENCY DEPT VISIT MOD MDM: CPT

## 2023-06-12 PROCEDURE — 84484 ASSAY OF TROPONIN QUANT: CPT | Performed by: EMERGENCY MEDICINE

## 2023-06-12 PROCEDURE — 93005 ELECTROCARDIOGRAM TRACING: CPT | Performed by: EMERGENCY MEDICINE

## 2023-06-12 PROCEDURE — 83880 ASSAY OF NATRIURETIC PEPTIDE: CPT | Performed by: EMERGENCY MEDICINE

## 2023-06-12 PROCEDURE — 80053 COMPREHEN METABOLIC PANEL: CPT | Performed by: EMERGENCY MEDICINE

## 2023-06-12 PROCEDURE — 83690 ASSAY OF LIPASE: CPT | Performed by: EMERGENCY MEDICINE

## 2023-06-12 PROCEDURE — 85025 COMPLETE CBC W/AUTO DIFF WBC: CPT | Performed by: EMERGENCY MEDICINE

## 2023-06-12 PROCEDURE — 36415 COLL VENOUS BLD VENIPUNCTURE: CPT

## 2023-06-12 PROCEDURE — 71045 X-RAY EXAM CHEST 1 VIEW: CPT

## 2023-06-12 RX ORDER — SODIUM CHLORIDE 0.9 % (FLUSH) 0.9 %
10 SYRINGE (ML) INJECTION AS NEEDED
Status: DISCONTINUED | OUTPATIENT
Start: 2023-06-12 | End: 2023-06-12 | Stop reason: HOSPADM

## 2023-06-12 RX ORDER — ACETAMINOPHEN 500 MG
1000 TABLET ORAL EVERY 6 HOURS PRN
Qty: 30 TABLET | Refills: 0 | Status: SHIPPED | OUTPATIENT
Start: 2023-06-12

## 2023-06-12 RX ORDER — ALBUTEROL SULFATE 90 UG/1
2 AEROSOL, METERED RESPIRATORY (INHALATION) EVERY 4 HOURS PRN
Qty: 18 G | Refills: 0 | Status: SHIPPED | OUTPATIENT
Start: 2023-06-12

## 2023-06-12 RX ORDER — ASPIRIN 81 MG/1
324 TABLET, CHEWABLE ORAL ONCE
Status: DISCONTINUED | OUTPATIENT
Start: 2023-06-12 | End: 2023-06-12 | Stop reason: HOSPADM

## 2023-06-12 NOTE — ED PROVIDER NOTES
Subjective   History of Present Illness  Patient is a 48-year-old male who awoke around 6 AM this morning with substernal chest discomfort and pain.  He does report some tingling in the right arm.  Patient reports a history of similar and states he was evaluated here in January for the same.  Patient received aspirin and nitroglycerin prior to arrival.  Patient has no cardiac history personally but does report a family history.  No other significant past medical history.  Patient has a past history of Hodgkin's lymphoma as well as hepatitis C.    History provided by:  Patient and EMS personnel    Review of Systems    Past Medical History:   Diagnosis Date    Hepatitis C     Hodgkin's lymphoma        Allergies   Allergen Reactions    Penicillins Hives and Unknown (See Comments)       Past Surgical History:   Procedure Laterality Date    CHOLECYSTECTOMY         History reviewed. No pertinent family history.    Social History     Socioeconomic History    Marital status:    Tobacco Use    Smoking status: Some Days     Packs/day: 0.50     Types: Cigarettes    Smokeless tobacco: Never   Vaping Use    Vaping Use: Never used   Substance and Sexual Activity    Alcohol use: No    Drug use: No    Sexual activity: Defer           Objective   Physical Exam  Vitals and nursing note reviewed.   Constitutional:       General: He is not in acute distress.     Appearance: He is well-developed. He is not toxic-appearing.   HENT:      Head: Normocephalic.   Cardiovascular:      Rate and Rhythm: Normal rate and regular rhythm.      Pulses:           Radial pulses are 2+ on the right side and 2+ on the left side.   Pulmonary:      Effort: Pulmonary effort is normal. No tachypnea.      Breath sounds: Normal breath sounds. No decreased breath sounds.   Abdominal:      Palpations: Abdomen is soft.   Musculoskeletal:         General: Normal range of motion.      Cervical back: Normal range of motion.      Right lower leg: No edema.       Left lower leg: No edema.   Skin:     General: Skin is warm and dry.   Neurological:      Mental Status: He is alert and oriented to person, place, and time.   Psychiatric:         Mood and Affect: Mood normal.         Behavior: Behavior normal.       Procedures           ED Course  ED Course as of 06/12/23 1426   Mon Jun 12, 2023   0906 Chart review demonstrates the patient was seen here for chest pain in December 2022.  Negative work-up at that time.  Patient's chart today and and report is negative for significant past medical history, but the patient reported history of hypertension, hyperlipidemia, and smoking in December.  Patient reported here that he had a negative cardiac catheterization in January.  At the December visit, the patient reported that he had had a negative cardiac catheterization about 3 months prior.  There is a visit to the hospital at [facility] in August of last year though the details and data are very vague. [RS]   0951 HS Troponin T: <6  Negative troponin. [RS]   0951 XR Chest 1 View  Personally reviewed the single view the chest and on my interpretation demonstrates no lobar infiltrate or emergent finding.  See report for radiology for details. [RS]   1209 Patient with negative cardiac enzymes x2 with nonischemic EKGs.  We will plan to discharge the patient home to follow-up as an outpatient for further evaluation and management.  Patient states that he had a normal cardiac catheterization without any occlusive disease in the not too distant past.  Thus, acute coronary syndrome is highly unlikely. I had a discussion with the patient/family regarding diagnosis, diagnostic results, treatment plan, and medications.  The patient/family indicated understanding of these instructions.  I spent adequate time at the bedside prior to discharge necessary to discuss the aftercare instructions, giving patient education, providing explanations of the results of our evaluations/findings,  and my decision making to assure that the patient/family understand the plan of care.  Time was allotted to answer questions at that time and throughout the ED course.  Emphasis was placed on timely follow-up after discharge.  I also discussed the potential for the development of an acute emergent condition requiring further evaluation, return to the ER, admission, or even surgical intervention. I discussed that we found nothing during the visit today indicating the need for further ER workup at this time, admission to the hospital, or the presence of an acute unstable medical condition.  I encouraged the patient to return to the emergency department immediately for ANY concerns, worsening, new complaints, or if symptoms persist and unable to seek follow-up in a timely fashion.  The patient/family expressed understanding and agreement with this plan.  [RS]      ED Course User Index  [RS] Ant Pizano MD                      HEART Score: 2                      Medical Decision Making  Problems Addressed:  Chest pain, unspecified type: complicated acute illness or injury  Tobacco use: complicated acute illness or injury    Amount and/or Complexity of Data Reviewed  Independent Historian: EMS  External Data Reviewed: labs, ECG and notes.  Labs: ordered. Decision-making details documented in ED Course.  Radiology: ordered. Decision-making details documented in ED Course.  ECG/medicine tests: ordered.    Risk  OTC drugs.  Prescription drug management.        Final diagnoses:   Chest pain, unspecified type   Tobacco use       ED Disposition  ED Disposition       ED Disposition   Discharge    Condition   Stable    Comment   --               [facility] Emergency Department  [address] [address] [phone]    As needed, If symptoms worsen or ANY concerns.    PATIENT CONNECTION - [place] [address]  [phone]  Schedule an appointment as soon as  possible for a visit            Medication List        New Prescriptions      acetaminophen 500 MG tablet  Commonly known as: TYLENOL  Take 2 tablets by mouth Every 6 (Six) Hours As Needed for Mild Pain or Moderate Pain.     albuterol sulfate  (90 Base) MCG/ACT inhaler  Commonly known as: PROVENTIL HFA;VENTOLIN HFA;PROAIR HFA  Inhale 2 puffs Every 4 (Four) Hours As Needed for Wheezing or Shortness of Air.            Stop      azithromycin 250 MG tablet  Commonly known as: Zithromax     promethazine-dextromethorphan 6.25-15 MG/5ML syrup  Commonly known as: PROMETHAZINE-DM               Where to Get Your Medications        These medications were sent to [id number] - [place], [place] - [address] [place] [facility] [address] [place] - [phone]  - [phone] FX  [address], [facility] [address]      Phone: [phone]   acetaminophen 500 MG tablet  albuterol sulfate  (90 Base) MCG/ACT inhaler            Ant Pizano MD  06/12/23 2934

## 2024-03-28 ENCOUNTER — LAB (OUTPATIENT)
Dept: INTERNAL MEDICINE | Facility: CLINIC | Age: 49
End: 2024-03-28
Payer: COMMERCIAL

## 2024-03-28 ENCOUNTER — OFFICE VISIT (OUTPATIENT)
Dept: INTERNAL MEDICINE | Facility: CLINIC | Age: 49
End: 2024-03-28
Payer: COMMERCIAL

## 2024-03-28 VITALS
HEIGHT: 65 IN | HEART RATE: 78 BPM | SYSTOLIC BLOOD PRESSURE: 122 MMHG | TEMPERATURE: 97.1 F | OXYGEN SATURATION: 97 % | BODY MASS INDEX: 30.79 KG/M2 | DIASTOLIC BLOOD PRESSURE: 80 MMHG | WEIGHT: 184.8 LBS

## 2024-03-28 DIAGNOSIS — J02.9 SORE THROAT: ICD-10-CM

## 2024-03-28 DIAGNOSIS — F32.A DEPRESSION, UNSPECIFIED DEPRESSION TYPE: ICD-10-CM

## 2024-03-28 DIAGNOSIS — I10 HYPERTENSION, UNSPECIFIED TYPE: ICD-10-CM

## 2024-03-28 DIAGNOSIS — Z11.59 NEED FOR HEPATITIS C SCREENING TEST: ICD-10-CM

## 2024-03-28 DIAGNOSIS — G47.9 DIFFICULTY SLEEPING: ICD-10-CM

## 2024-03-28 DIAGNOSIS — J30.2 SEASONAL ALLERGIES: ICD-10-CM

## 2024-03-28 DIAGNOSIS — R43.0 LOSS OF SMELL: ICD-10-CM

## 2024-03-28 DIAGNOSIS — R09.89 CHEST CONGESTION: ICD-10-CM

## 2024-03-28 DIAGNOSIS — R52 BODY ACHES: Primary | ICD-10-CM

## 2024-03-28 DIAGNOSIS — E78.5 DYSLIPIDEMIA: ICD-10-CM

## 2024-03-28 DIAGNOSIS — J32.9 SINUSITIS, UNSPECIFIED CHRONICITY, UNSPECIFIED LOCATION: ICD-10-CM

## 2024-03-28 LAB
EXPIRATION DATE: NORMAL
EXPIRATION DATE: NORMAL
FLUAV AG UPPER RESP QL IA.RAPID: NOT DETECTED
FLUBV AG UPPER RESP QL IA.RAPID: NOT DETECTED
INTERNAL CONTROL: NORMAL
INTERNAL CONTROL: NORMAL
Lab: NORMAL
Lab: NORMAL
S PYO AG THROAT QL: NEGATIVE
SARS-COV-2 AG UPPER RESP QL IA.RAPID: NOT DETECTED

## 2024-03-28 PROCEDURE — 86803 HEPATITIS C AB TEST: CPT | Performed by: STUDENT IN AN ORGANIZED HEALTH CARE EDUCATION/TRAINING PROGRAM

## 2024-03-28 PROCEDURE — 87340 HEPATITIS B SURFACE AG IA: CPT | Performed by: STUDENT IN AN ORGANIZED HEALTH CARE EDUCATION/TRAINING PROGRAM

## 2024-03-28 PROCEDURE — 83036 HEMOGLOBIN GLYCOSYLATED A1C: CPT | Performed by: STUDENT IN AN ORGANIZED HEALTH CARE EDUCATION/TRAINING PROGRAM

## 2024-03-28 PROCEDURE — 36415 COLL VENOUS BLD VENIPUNCTURE: CPT | Performed by: STUDENT IN AN ORGANIZED HEALTH CARE EDUCATION/TRAINING PROGRAM

## 2024-03-28 PROCEDURE — 87522 HEPATITIS C REVRS TRNSCRPJ: CPT | Performed by: STUDENT IN AN ORGANIZED HEALTH CARE EDUCATION/TRAINING PROGRAM

## 2024-03-28 PROCEDURE — 84439 ASSAY OF FREE THYROXINE: CPT | Performed by: STUDENT IN AN ORGANIZED HEALTH CARE EDUCATION/TRAINING PROGRAM

## 2024-03-28 PROCEDURE — 80053 COMPREHEN METABOLIC PANEL: CPT | Performed by: STUDENT IN AN ORGANIZED HEALTH CARE EDUCATION/TRAINING PROGRAM

## 2024-03-28 PROCEDURE — 80061 LIPID PANEL: CPT | Performed by: STUDENT IN AN ORGANIZED HEALTH CARE EDUCATION/TRAINING PROGRAM

## 2024-03-28 PROCEDURE — 84443 ASSAY THYROID STIM HORMONE: CPT | Performed by: STUDENT IN AN ORGANIZED HEALTH CARE EDUCATION/TRAINING PROGRAM

## 2024-03-28 RX ORDER — FLUTICASONE PROPIONATE 50 MCG
2 SPRAY, SUSPENSION (ML) NASAL DAILY
Qty: 15.8 ML | Refills: 0 | Status: SHIPPED | OUTPATIENT
Start: 2024-03-28

## 2024-03-28 RX ORDER — AZITHROMYCIN 250 MG/1
TABLET, FILM COATED ORAL
Qty: 6 TABLET | Refills: 0 | Status: SHIPPED | OUTPATIENT
Start: 2024-03-28

## 2024-03-28 RX ORDER — METHYLPREDNISOLONE 4 MG/1
TABLET ORAL
Qty: 21 TABLET | Refills: 0 | Status: SHIPPED | OUTPATIENT
Start: 2024-03-28

## 2024-03-28 RX ORDER — BUPROPION HYDROCHLORIDE 150 MG/1
150 TABLET, EXTENDED RELEASE ORAL 2 TIMES DAILY
COMMUNITY
Start: 2024-03-08

## 2024-03-28 RX ORDER — CETIRIZINE HYDROCHLORIDE 10 MG/1
10 TABLET ORAL DAILY
Qty: 30 TABLET | Refills: 2 | Status: SHIPPED | OUTPATIENT
Start: 2024-03-28

## 2024-03-28 NOTE — PROGRESS NOTES
Office Note     Name: Wilmer Stover    : 1975     MRN: [id number]     Chief Complaint  URI, Generalized Body Aches, Loss Of Smell, and Sore Throat    Subjective     History of Present Illness:  Wilmer Stover is a 48 y.o. male who presents today for     Congestion since Saturday, difficulty to breathe, no fever, lost his taste and smell. Throat is sore. Feels like ears are full.      Has been at [facility] sober living, currently in a transitional program  Last drink: 7.5 and 8 years  Drug abuse: meth+suboxone.       Goes to [facility] and sees a therapist.  -Wellbutrin, trazodone.     Has history of HTN And dyslipidemia, and thyroid disorder. He is currently not on meds.     Hx of lymphoma in . Had radiation and chemotherapy.     Review of Systems:   Review of Systems   All other systems reviewed and are negative.      Past Medical History:   Past Medical History:   Diagnosis Date    Allergic     Anxiety     Arthritis     COPD (chronic obstructive pulmonary disease)     Depression     Gall stones     Hepatitis C     Hodgkin's lymphoma     Kidney stone     Memory loss     Neuropathy     Numbness and tingling     Thyroid disease     Weakness        Past Surgical History:   Past Surgical History:   Procedure Laterality Date    CHOLECYSTECTOMY      CYSTOSCOPY BLADDER STONE LITHOTRIPSY      MASTECTOMY MODIFIED RADICAL W/ AXILLARY LYMPH NODES W/ OR W/O PECTORALIS MINOR         Family History:   Family History   Problem Relation Age of Onset    Hypertension Mother     Hyperlipidemia Mother     Alcohol abuse Mother     Arthritis Mother     Alcohol abuse Father        Social History:   Social History     Socioeconomic History    Marital status:    Tobacco Use    Smoking status: Some Days     Current packs/day: 0.50     Types: Cigarettes    Smokeless tobacco: Never   Vaping Use    Vaping status: Never Used   Substance and Sexual Activity    Alcohol use: No    Drug use: Not Currently     Comment:  sobriety date 12/22/2022    Sexual activity: Defer       Immunizations:   Immunization History   Administered Date(s) Administered    Pneumococcal Polysaccharide (PPSV23) 05/06/2015    Tdap 11/23/2017, 07/05/2019        Medications:     Current Outpatient Medications:     albuterol sulfate  (90 Base) MCG/ACT inhaler, Inhale 2 puffs Every 4 (Four) Hours As Needed for Wheezing or Shortness of Air., Disp: 18 g, Rfl: 0    buPROPion SR (WELLBUTRIN SR) 150 MG 12 hr tablet, Take 1 tablet by mouth 2 (Two) Times a Day., Disp: , Rfl:     traZODone (DESYREL) 100 MG tablet, Take 1 tablet by mouth every night at bedtime., Disp: , Rfl:     acetaminophen (TYLENOL) 500 MG tablet, Take 2 tablets by mouth Every 6 (Six) Hours As Needed for Mild Pain or Moderate Pain. (Patient not taking: Reported on 3/28/2024), Disp: 30 tablet, Rfl: 0    azithromycin (Zithromax Z-Mykel) 250 MG tablet, Take 2 tablets by mouth on day 1, then 1 tablet daily on days 2-5, Disp: 6 tablet, Rfl: 0    buPROPion (WELLBUTRIN) 100 MG tablet, Take 2 tablets by mouth Every 12 (Twelve) Hours. (Patient not taking: Reported on 3/28/2024), Disp: , Rfl:     cetirizine (zyrTEC) 10 MG tablet, Take 1 tablet by mouth Daily. For allergies, Disp: 30 tablet, Rfl: 2    dicyclomine (BENTYL) 10 MG capsule, TAKE 1 CAPSULE BY MOUTH EVERY 6 HOURS AS NEEDED FOR STOMACH CRAMPING (Patient not taking: Reported on 3/28/2024), Disp: , Rfl:     doxycycline (VIBRAMYCIN) 100 MG capsule, , Disp: , Rfl:     fluticasone (FLONASE) 50 MCG/ACT nasal spray, 2 sprays into the nostril(s) as directed by provider Daily., Disp: 15.8 mL, Rfl: 0    levothyroxine (SYNTHROID, LEVOTHROID) 25 MCG tablet, Take 1 tablet by mouth. (Patient not taking: Reported on 3/28/2024), Disp: , Rfl:     methylPREDNISolone (MEDROL) 4 MG dose pack, Take as directed on package instructions., Disp: 21 tablet, Rfl: 0    ondansetron ODT (ZOFRAN-ODT) 4 MG disintegrating tablet, , Disp: , Rfl:     PrilOSEC OTC 20 MG EC tablet,  "Take 1 tablet by mouth Every Morning. (Patient not taking: Reported on 3/28/2024), Disp: , Rfl:     promethazine (PHENERGAN) 25 MG tablet, Take 1 tablet by mouth 6 (Six) Times a Day. (Patient not taking: Reported on 3/28/2024), Disp: , Rfl:     Sofosbuvir-Velpatasvir 400-100 MG tablet, , Disp: , Rfl:     Sublocade 300 MG/1.5ML solution prefilled syringe, , Disp: , Rfl:     Allergies:   Allergies   Allergen Reactions    Penicillins Hives and Unknown (See Comments)       Objective     Vital Signs  /80   Pulse 78   Temp 97.1 °F (36.2 °C) (Temporal)   Ht 166 cm (65.35\")   Wt 83.8 kg (184 lb 12.8 oz)   SpO2 97%   BMI 30.42 kg/m²   Estimated body mass index is 30.42 kg/m² as calculated from the following:    Height as of this encounter: 166 cm (65.35\").    Weight as of this encounter: 83.8 kg (184 lb 12.8 oz).          Physical Exam  Constitutional:       Appearance: Normal appearance.   Cardiovascular:      Rate and Rhythm: Normal rate and regular rhythm.      Pulses: Normal pulses.      Heart sounds: Normal heart sounds.   Pulmonary:      Effort: Pulmonary effort is normal.      Breath sounds: Normal breath sounds.   Neurological:      Mental Status: He is alert.          Result Review :                Results for orders placed or performed in visit on 03/28/24   POCT SARS-CoV-2 Antigen YAMIL + Flu    Specimen: Swab   Result Value Ref Range    SARS Antigen Not Detected Not Detected, Presumptive Negative    Influenza A Antigen YAMIL Not Detected Not Detected    Influenza B Antigen YAMIL Not Detected Not Detected    Internal Control Passed Passed    Lot Number [id number]     Expiration Date 12/4/24    POCT rapid strep A    Specimen: Swab   Result Value Ref Range    Rapid Strep A Screen Negative Negative, VALID, INVALID, Not Performed    Internal Control Passed Passed    Lot Number [id number]     Expiration Date 7/9/25          Assessment and Plan     1. Body aches  Negative flu, covid, strep  OTC ibuprofen/tylenol for " pain  - POCT SARS-CoV-2 Antigen YAMIL + Flu    2. Loss of smell  Negative flu, covid, strep  - POCT SARS-CoV-2 Antigen YAMIL + Flu    3. Chest congestion  Zyrtec PRn  - POCT SARS-CoV-2 Antigen YAMIL + Flu    4. Sore throat    - POCT rapid strep A    5. Depression, unspecified depression type  Controlled, follows psychiatiry  Continue with Bupropion as recommended by specialist. Record request  - buPROPion SR (WELLBUTRIN SR) 150 MG 12 hr tablet; Take 1 tablet by mouth 2 (Two) Times a Day.    6. Difficulty sleeping  Continue with trazodone    7. Need for hepatitis C screening test    - HCV Antibody Rfx To Qnt PCR; Future  - Hepatitis B surface antigen; Future    8. Seasonal allergies    - cetirizine (zyrTEC) 10 MG tablet; Take 1 tablet by mouth Daily. For allergies  Dispense: 30 tablet; Refill: 2  - fluticasone (FLONASE) 50 MCG/ACT nasal spray; 2 sprays into the nostril(s) as directed by provider Daily.  Dispense: 15.8 mL; Refill: 0    9. Sinusitis, unspecified chronicity, unspecified location    - cetirizine (zyrTEC) 10 MG tablet; Take 1 tablet by mouth Daily. For allergies  Dispense: 30 tablet; Refill: 2  - fluticasone (FLONASE) 50 MCG/ACT nasal spray; 2 sprays into the nostril(s) as directed by provider Daily.  Dispense: 15.8 mL; Refill: 0  - methylPREDNISolone (MEDROL) 4 MG dose pack; Take as directed on package instructions.  Dispense: 21 tablet; Refill: 0  - azithromycin (Zithromax Z-Mykel) 250 MG tablet; Take 2 tablets by mouth on day 1, then 1 tablet daily on days 2-5  Dispense: 6 tablet; Refill: 0    10. Hypertension, unspecified type  Controlled today in office, not on meds currently  Will monitor.   - TSH Rfx On Abnormal To Free T4; Future  - Comprehensive metabolic panel; Future  - Hemoglobin A1c; Future  - Lipid panel; Future       Follow Up  Return in about 4 weeks (around 4/25/2024) for Annual.    Mata Maher MD  [facility]  [address]  [facility]  [address]  [facility]  [address]  [facility] [address]  [phone]

## 2024-03-29 LAB
ALBUMIN SERPL-MCNC: 4.6 G/DL (ref 3.5–5.2)
ALBUMIN/GLOB SERPL: 1.4 G/DL
ALP SERPL-CCNC: 100 U/L (ref 39–117)
ALT SERPL W P-5'-P-CCNC: 18 U/L (ref 1–41)
ANION GAP SERPL CALCULATED.3IONS-SCNC: 12.9 MMOL/L (ref 5–15)
AST SERPL-CCNC: 27 U/L (ref 1–40)
BILIRUB SERPL-MCNC: 0.3 MG/DL (ref 0–1.2)
BUN SERPL-MCNC: 8 MG/DL (ref 6–20)
BUN/CREAT SERPL: 7.5 (ref 7–25)
CALCIUM SPEC-SCNC: 9.4 MG/DL (ref 8.6–10.5)
CHLORIDE SERPL-SCNC: 103 MMOL/L (ref 98–107)
CHOLEST SERPL-MCNC: 274 MG/DL (ref 0–200)
CO2 SERPL-SCNC: 22.1 MMOL/L (ref 22–29)
CREAT SERPL-MCNC: 1.06 MG/DL (ref 0.76–1.27)
EGFRCR SERPLBLD CKD-EPI 2021: 86.6 ML/MIN/1.73
GLOBULIN UR ELPH-MCNC: 3.4 GM/DL
GLUCOSE SERPL-MCNC: 75 MG/DL (ref 65–99)
HBA1C MFR BLD: 5.6 % (ref 4.8–5.6)
HBV SURFACE AG SERPL QL IA: NORMAL
HDLC SERPL-MCNC: 30 MG/DL (ref 40–60)
LDLC SERPL CALC-MCNC: 216 MG/DL (ref 0–100)
LDLC/HDLC SERPL: 7.16 {RATIO}
POTASSIUM SERPL-SCNC: 4.2 MMOL/L (ref 3.5–5.2)
PROT SERPL-MCNC: 8 G/DL (ref 6–8.5)
SODIUM SERPL-SCNC: 138 MMOL/L (ref 136–145)
T4 FREE SERPL-MCNC: 1.24 NG/DL (ref 0.93–1.7)
TRIGL SERPL-MCNC: 146 MG/DL (ref 0–150)
TSH SERPL DL<=0.05 MIU/L-ACNC: 4.49 UIU/ML (ref 0.27–4.2)
VLDLC SERPL-MCNC: 28 MG/DL (ref 5–40)

## 2024-03-30 LAB
DIAGNOSTIC IMP SPEC-IMP: NORMAL
HCV IGG SERPL QL IA: REACTIVE
HCV RNA SERPL NAA+PROBE-ACNC: NORMAL IU/ML
REF LAB TEST REF RANGE: NORMAL

## 2024-03-31 RX ORDER — ATORVASTATIN CALCIUM 40 MG/1
40 TABLET, FILM COATED ORAL DAILY
Qty: 90 TABLET | Refills: 1 | Status: SHIPPED | OUTPATIENT
Start: 2024-03-31

## 2024-04-05 ENCOUNTER — PATIENT ROUNDING (BHMG ONLY) (OUTPATIENT)
Dept: INTERNAL MEDICINE | Facility: CLINIC | Age: 49
End: 2024-04-05
Payer: COMMERCIAL

## 2024-04-05 NOTE — PROGRESS NOTES
My name is Alejandra Yoder and I am a patient experience representative for [facility] on [facility].    I would like to officially welcome you to our practice.  If you do not mind I would like to ask you a few questions about your recent visit with us.  If you do not have the time to reply that is perfectly fine.      First, could you tell me what went well with your recent visit?     Secondly, we are always looking for ways to make our patients' experiences even better. Do you have any recommendations on ways we may improve?     Third, safety is a top priority therefore do you have any concerns with that while in our office?    Finally, overall were you satisfied with your first visit to our practice?     Thank you for taking the time to answer a few questions today.  In the coming days you will receive a survey.  We encourage you to complete the survey to let us know how we did!        Alejandra

## 2024-04-26 ENCOUNTER — OFFICE VISIT (OUTPATIENT)
Dept: INTERNAL MEDICINE | Facility: CLINIC | Age: 49
End: 2024-04-26
Payer: COMMERCIAL

## 2024-04-26 VITALS
WEIGHT: 187.4 LBS | HEART RATE: 71 BPM | HEIGHT: 65 IN | BODY MASS INDEX: 31.22 KG/M2 | TEMPERATURE: 96.9 F | SYSTOLIC BLOOD PRESSURE: 126 MMHG | OXYGEN SATURATION: 96 % | DIASTOLIC BLOOD PRESSURE: 76 MMHG

## 2024-04-26 DIAGNOSIS — I10 HYPERTENSION, UNSPECIFIED TYPE: ICD-10-CM

## 2024-04-26 DIAGNOSIS — F32.4 MAJOR DEPRESSIVE DISORDER WITH SINGLE EPISODE, IN PARTIAL REMISSION: ICD-10-CM

## 2024-04-26 DIAGNOSIS — E78.5 DYSLIPIDEMIA: ICD-10-CM

## 2024-04-26 DIAGNOSIS — Z00.00 ANNUAL PHYSICAL EXAM: Primary | ICD-10-CM

## 2024-04-26 DIAGNOSIS — J32.9 SINUSITIS, UNSPECIFIED CHRONICITY, UNSPECIFIED LOCATION: ICD-10-CM

## 2024-04-26 DIAGNOSIS — Z85.72 HISTORY OF LYMPHOMA: ICD-10-CM

## 2024-04-26 DIAGNOSIS — J30.2 SEASONAL ALLERGIES: ICD-10-CM

## 2024-04-26 DIAGNOSIS — Z12.11 SCREENING FOR COLON CANCER: ICD-10-CM

## 2024-04-26 RX ORDER — CETIRIZINE HYDROCHLORIDE 10 MG/1
10 TABLET ORAL DAILY
Qty: 30 TABLET | Refills: 2 | Status: SHIPPED | OUTPATIENT
Start: 2024-04-26

## 2024-04-26 RX ORDER — ATORVASTATIN CALCIUM 40 MG/1
40 TABLET, FILM COATED ORAL DAILY
Qty: 90 TABLET | Refills: 1 | Status: SHIPPED | OUTPATIENT
Start: 2024-04-26

## 2024-04-26 RX ORDER — ALBUTEROL SULFATE 90 UG/1
2 AEROSOL, METERED RESPIRATORY (INHALATION) EVERY 4 HOURS PRN
Qty: 18 G | Refills: 0 | Status: SHIPPED | OUTPATIENT
Start: 2024-04-26

## 2024-04-26 NOTE — PROGRESS NOTES
Office Note     Name: Wilmer Stover    : 1975     MRN: [id number]     Chief Complaint  Annual Exam    Subjective     History of Present Illness:  Wilmer Stover is a 48 y.o. male who presents today for       Here for physical  Also discuss BP, cholesterol and depression.      HTN: Not currently on blood pressure Medicine. Was in the past, but been monitoring off meds.     Anxiety/Depression  On Wellbutrin 300mg daily, mood is stable, does not feel as depressed when he is off the medication  On Trazodone 100mg QHS, helps him sleep at night.       Dyslipidemia   His LDL was > 200, never been on cholesterol medication.     PHQ-9 Depression Screening  Little interest or pleasure in doing things? 0-->not at all   Feeling down, depressed, or hopeless? 0-->not at all   Trouble falling or staying asleep, or sleeping too much?     Feeling tired or having little energy?     Poor appetite or overeating?     Feeling bad about yourself - or that you are a failure or have let yourself or your family down?     Trouble concentrating on things, such as reading the newspaper or watching television?     Moving or speaking so slowly that other people could have noticed? Or the opposite - being so fidgety or restless that you have been moving around a lot more than usual?     Thoughts that you would be better off dead, or of hurting yourself in some way?     PHQ-9 Total Score 0   If you checked off any problems, how difficult have these problems made it for you to do your work, take care of things at home, or get along with other people?           Anxiety RYAN-7    Feeling nervous, anxious or on edge: Not at all  Not being able to stop or control worrying: Not at all  Worrying too much about different things: Not at all  Trouble Relaxing: Not at all  Being so restless that it is hard to sit still: Not at all  Becoming easily annoyed or irritable: Not at all  Feeling afraid as if something awful might happen: Not at all  RYAN 7  Total Score: 0  If you checked any problems, how difficult have these problems made it for you to do your work, take care of things at home, or get along with other people: Not difficult at all     Has been at [facility] sober living, currently in a transitional program  Last drink: 7.5 and 8 years  Drug abuse: meth+suboxone.      Hx of lymphoma in 2014. Had radiation and chemotherapy.     Review of Systems:   Review of Systems   All other systems reviewed and are negative.      Past Medical History:   Past Medical History:   Diagnosis Date    Allergic     Anxiety     Arthritis     COPD (chronic obstructive pulmonary disease)     Depression     Gall stones     Hepatitis C     Hodgkin's lymphoma     Kidney stone     Memory loss     Neuropathy     Numbness and tingling     Thyroid disease     Weakness        Past Surgical History:   Past Surgical History:   Procedure Laterality Date    CHOLECYSTECTOMY      CYSTOSCOPY BLADDER STONE LITHOTRIPSY      MASTECTOMY MODIFIED RADICAL W/ AXILLARY LYMPH NODES W/ OR W/O PECTORALIS MINOR         Family History:   Family History   Problem Relation Age of Onset    Hypertension Mother     Hyperlipidemia Mother     Alcohol abuse Mother     Arthritis Mother     Alcohol abuse Father        Social History:   Social History     Socioeconomic History    Marital status:    Tobacco Use    Smoking status: Some Days     Current packs/day: 0.50     Types: Cigarettes    Smokeless tobacco: Never   Vaping Use    Vaping status: Never Used   Substance and Sexual Activity    Alcohol use: No    Drug use: Not Currently     Comment: sobriety date 12/22/2022    Sexual activity: Not Currently       Immunizations:   Immunization History   Administered Date(s) Administered    Pneumococcal Polysaccharide (PPSV23) 05/06/2015    Tdap 11/23/2017, 07/05/2019        Medications:     Current Outpatient Medications:     albuterol sulfate  (90 Base) MCG/ACT inhaler, Inhale 2 puffs Every 4 (Four)  "Hours As Needed for Wheezing or Shortness of Air., Disp: 18 g, Rfl: 0    atorvastatin (Lipitor) 40 MG tablet, Take 1 tablet by mouth Daily., Disp: 90 tablet, Rfl: 1    buPROPion SR (WELLBUTRIN SR) 150 MG 12 hr tablet, Take 1 tablet by mouth 2 (Two) Times a Day., Disp: , Rfl:     cetirizine (zyrTEC) 10 MG tablet, Take 1 tablet by mouth Daily. For allergies, Disp: 30 tablet, Rfl: 2    fluticasone (FLONASE) 50 MCG/ACT nasal spray, 2 sprays into the nostril(s) as directed by provider Daily., Disp: 15.8 mL, Rfl: 0    PrilOSEC OTC 20 MG EC tablet, Take 1 tablet by mouth Every Morning., Disp: , Rfl:     traZODone (DESYREL) 100 MG tablet, Take 1 tablet by mouth every night at bedtime., Disp: , Rfl:     ondansetron ODT (ZOFRAN-ODT) 4 MG disintegrating tablet, , Disp: , Rfl:     Allergies:   Allergies   Allergen Reactions    Penicillins Hives and Unknown (See Comments)       Objective     Vital Signs  /76   Pulse 71   Temp 96.9 °F (36.1 °C) (Temporal)   Ht 166 cm (65.35\")   Wt 85 kg (187 lb 6.4 oz)   SpO2 96%   BMI 30.85 kg/m²   Estimated body mass index is 30.85 kg/m² as calculated from the following:    Height as of this encounter: 166 cm (65.35\").    Weight as of this encounter: 85 kg (187 lb 6.4 oz).            Physical Exam  Constitutional:       Appearance: Normal appearance. He is obese.   Cardiovascular:      Rate and Rhythm: Normal rate and regular rhythm.      Pulses: Normal pulses.      Heart sounds: Normal heart sounds.   Pulmonary:      Effort: Pulmonary effort is normal.      Breath sounds: Normal breath sounds.   Neurological:      Mental Status: He is alert.          Result Review :     Common labs          6/12/2023    09:15 3/28/2024    14:00   Common Labs   Glucose 102  75    BUN 9  8    Creatinine 0.91  1.06    Sodium 140  138    Potassium 4.4  4.2    Chloride 105  103    Calcium 9.5  9.4    Albumin 4.0  4.6    Total Bilirubin 0.2  0.3    Alkaline Phosphatase 77  100    AST (SGOT) 21  27    ALT " (SGPT) 14  18    WBC 6.26     Hemoglobin 13.2     Hematocrit 39.2     Platelets 237     Total Cholesterol  274    Triglycerides  146    HDL Cholesterol  30    LDL Cholesterol   216    Hemoglobin A1C  5.60                 1. Annual physical exam  The following were discussed at today's wellness visit today: preventaitve: Nutrition, Physical activity, Healthy weight, Injury prevention, Immunizations, and Screenings      2. Hypertension, unspecified type  Controlled with lifestyle management    3. Dyslipidemia  Start lipitor  Monitor weight, healthy low fat diet, physical activity  - atorvastatin (Lipitor) 40 MG tablet; Take 1 tablet by mouth Daily.  Dispense: 90 tablet; Refill: 1    4. Seasonal allergies  Med refilled   - cetirizine (zyrTEC) 10 MG tablet; Take 1 tablet by mouth Daily. For allergies  Dispense: 30 tablet; Refill: 2      5. Major depressive disorder with single episode, in partial remission  Mood stable  Continue with Wellbutrin 300mg daily  Continue with trazodone 100mg QHS for sleep    7. Screening for colon cancer    - Ambulatory Referral For Screening Colonoscopy    8. History of lymphoma  Monitor with yearly blood work  CBC WNL           Follow Up  Return in about 15 weeks (around 8/9/2024).    Mata Maher MD  [facility] [address]  [facility]  [address] [facility]  [address] [address] [phone]

## 2024-05-10 ENCOUNTER — TELEPHONE (OUTPATIENT)
Dept: INTERNAL MEDICINE | Facility: CLINIC | Age: 49
End: 2024-05-10
Payer: COMMERCIAL

## 2024-06-14 ENCOUNTER — OFFICE VISIT (OUTPATIENT)
Dept: INTERNAL MEDICINE | Facility: CLINIC | Age: 49
End: 2024-06-14
Payer: COMMERCIAL

## 2024-06-14 VITALS
HEART RATE: 98 BPM | BODY MASS INDEX: 31.16 KG/M2 | OXYGEN SATURATION: 97 % | TEMPERATURE: 97.1 F | WEIGHT: 187 LBS | HEIGHT: 65 IN | SYSTOLIC BLOOD PRESSURE: 134 MMHG | DIASTOLIC BLOOD PRESSURE: 88 MMHG | RESPIRATION RATE: 16 BRPM

## 2024-06-14 DIAGNOSIS — F32.A DEPRESSION, UNSPECIFIED DEPRESSION TYPE: ICD-10-CM

## 2024-06-14 DIAGNOSIS — J02.9 SORE THROAT: ICD-10-CM

## 2024-06-14 DIAGNOSIS — Z92.3 HISTORY OF HEAD AND NECK RADIATION: ICD-10-CM

## 2024-06-14 DIAGNOSIS — J32.9 SINUSITIS, UNSPECIFIED CHRONICITY, UNSPECIFIED LOCATION: Primary | ICD-10-CM

## 2024-06-14 DIAGNOSIS — R09.81 NASAL CONGESTION: ICD-10-CM

## 2024-06-14 DIAGNOSIS — J30.2 SEASONAL ALLERGIES: ICD-10-CM

## 2024-06-14 DIAGNOSIS — G47.9 DIFFICULTY SLEEPING: ICD-10-CM

## 2024-06-14 DIAGNOSIS — R07.89 CHEST TIGHTNESS: ICD-10-CM

## 2024-06-14 PROCEDURE — 1125F AMNT PAIN NOTED PAIN PRSNT: CPT | Performed by: INTERNAL MEDICINE

## 2024-06-14 PROCEDURE — 1160F RVW MEDS BY RX/DR IN RCRD: CPT | Performed by: INTERNAL MEDICINE

## 2024-06-14 PROCEDURE — 87880 STREP A ASSAY W/OPTIC: CPT | Performed by: INTERNAL MEDICINE

## 2024-06-14 PROCEDURE — 87428 SARSCOV & INF VIR A&B AG IA: CPT | Performed by: INTERNAL MEDICINE

## 2024-06-14 PROCEDURE — 99214 OFFICE O/P EST MOD 30 MIN: CPT | Performed by: INTERNAL MEDICINE

## 2024-06-14 PROCEDURE — 1159F MED LIST DOCD IN RCRD: CPT | Performed by: INTERNAL MEDICINE

## 2024-06-14 RX ORDER — DOXYCYCLINE HYCLATE 100 MG/1
100 CAPSULE ORAL 2 TIMES DAILY
Qty: 14 CAPSULE | Refills: 0 | Status: SHIPPED | OUTPATIENT
Start: 2024-06-14

## 2024-06-14 RX ORDER — CETIRIZINE HYDROCHLORIDE 10 MG/1
10 TABLET ORAL DAILY
Qty: 30 TABLET | Refills: 5 | Status: SHIPPED | OUTPATIENT
Start: 2024-06-14

## 2024-06-14 RX ORDER — BUPROPION HYDROCHLORIDE 150 MG/1
150 TABLET, EXTENDED RELEASE ORAL 2 TIMES DAILY
Qty: 60 TABLET | Refills: 5 | Status: SHIPPED | OUTPATIENT
Start: 2024-06-14

## 2024-06-14 RX ORDER — TRAZODONE HYDROCHLORIDE 100 MG/1
100 TABLET ORAL
Qty: 30 TABLET | Refills: 5 | Status: SHIPPED | OUTPATIENT
Start: 2024-06-14

## 2024-06-14 RX ORDER — ALBUTEROL SULFATE 90 UG/1
2 AEROSOL, METERED RESPIRATORY (INHALATION) EVERY 4 HOURS PRN
Qty: 18 G | Refills: 5 | Status: SHIPPED | OUTPATIENT
Start: 2024-06-14

## 2024-06-14 NOTE — PROGRESS NOTES
Chief Complaint  Nasal Congestion, Dizziness, Headache, Fatigue (Restless muscles ), Nausea, and Sore Throat    Subjective          Wilmer Stover presents to [facility]  History of Present Illness    URI symptoms -he has had sinus pressure, headache over last week, has felt dizzy and some fatigue - has to sit and rest at work  He does have albuterol inhaler and uses more in the humid weather.  So may use up to several times a day but does not seem to be helping as much here recently.  No official diagnosis of asthma does not think he has had PFTs  Cough is productive sometimes of brown mucus, but not much is coming up  Some drainage as well and sore throat; no fever, but has felt hot. Also some nausea but no vomiting  Is a smoker 1/3 ppd  He has taken some ibuprofen and benadryl for his symptoms  He did receive radiation for lymphoma 10 -12 yrs ago at -has been released from oncology  Did have CXR 6/23 and did have possible granuloma on the right upper lung but was unchanged from previous.      Insomnia- has been on trazodone but ran out a few days ago and would like a refill    Depression - on wellbutrin-has not run out but does need a refill    Current Outpatient Medications:     albuterol sulfate  (90 Base) MCG/ACT inhaler, Inhale 2 puffs Every 4 (Four) Hours As Needed for Wheezing or Shortness of Air., Disp: 18 g, Rfl: 5    atorvastatin (Lipitor) 40 MG tablet, Take 1 tablet by mouth Daily., Disp: 90 tablet, Rfl: 1    buPROPion SR (WELLBUTRIN SR) 150 MG 12 hr tablet, Take 1 tablet by mouth 2 (Two) Times a Day., Disp: 60 tablet, Rfl: 5    cetirizine (zyrTEC) 10 MG tablet, Take 1 tablet by mouth Daily. For allergies, Disp: 30 tablet, Rfl: 5    fluticasone (FLONASE) 50 MCG/ACT nasal spray, 2 sprays into the nostril(s) as directed by provider Daily., Disp: 15.8 mL, Rfl: 0    PrilOSEC OTC 20 MG EC tablet, Take 1 tablet by mouth Every Morning., Disp: , Rfl:     traZODone  "(DESYREL) 100 MG tablet, Take 1 tablet by mouth every night at bedtime., Disp: 30 tablet, Rfl: 5    doxycycline (VIBRAMYCIN) 100 MG capsule, Take 1 capsule by mouth 2 (Two) Times a Day., Disp: 14 capsule, Rfl: 0    ondansetron ODT (ZOFRAN-ODT) 4 MG disintegrating tablet, , Disp: , Rfl:    The following portions of the patient's history were reviewed and updated as appropriate: allergies, current medications, past family history, past medical history, past social history, past surgical history and problem list.     Objective   Vital Signs:   /88 (BP Location: Right arm, Patient Position: Sitting, Cuff Size: Adult)   Pulse 98   Temp 97.1 °F (36.2 °C) (Infrared)   Resp 16   Ht 166 cm (65.35\")   Wt 84.8 kg (187 lb)   SpO2 97%   BMI 30.78 kg/m²       Physical exam  Constitutional: oriented to person, place, and time.  well-developed and well-nourished. No distress.   HENT: maxillary sinuses tender  OP: slight erythema, no exudate  Neck: no LAD  Ears: tm normal B, small amount of cerumen  Head: Normocephalic and atraumatic.   Eyes: Conjunctivae and EOM are normal.   Cardiovascular: Normal rate, regular rhythm and normal heart sounds.  Exam reveals no gallop and no friction rub.    No murmur heard.  Pulmonary/Chest: Effort normal and breath sounds normal. No respiratory distress.  Occasional wheeze   Neurological:  alert and oriented to person, place, and time.   Skin: Skin is warm and dry. not diaphoretic.   Psychiatric:  normal mood and affect. behavior is normal. Judgment and thought content normal.    Flu/COVID-negative  Strep-negative  Result Review :                   Assessment and Plan    Diagnoses and all orders for this visit:    1. Sinusitis, unspecified chronicity, unspecified location (Primary)-we we will go ahead and cover with doxycycline.  Call if no better  -     doxycycline (VIBRAMYCIN) 100 MG capsule; Take 1 capsule by mouth 2 (Two) Times a Day.  Dispense: 14 capsule; Refill: 0  -     POCT " SARS-CoV-2 Antigen YAMIL + Flu  -     POCT rapid strep A  2. Depression, unspecified depression type  -     buPROPion SR (WELLBUTRIN SR) 150 MG 12 hr tablet; Take 1 tablet by mouth 2 (Two) Times a Day.  Dispense: 60 tablet; Refill: 5    3. Seasonal allergies  -     cetirizine (zyrTEC) 10 MG tablet; Take 1 tablet by mouth Daily. For allergies  Dispense: 30 tablet; Refill: 5  -     albuterol sulfate  (90 Base) MCG/ACT inhaler; Inhale 2 puffs Every 4 (Four) Hours As Needed for Wheezing or Shortness of Air.  Dispense: 18 g; Refill: 5    4. Difficulty sleeping  -     traZODone (DESYREL) 100 MG tablet; Take 1 tablet by mouth every night at bedtime.  Dispense: 30 tablet; Refill: 5    5. Chest tightness-we will go ahead and get PFTs.  Patient is not 100% sure he will be able to go with his work schedule but does have days off occasionally so we will try to schedule  -     Complete PFT - Pre & Post Bronchodilator; Future    6. History of head and neck radiation  -     Complete PFT - Pre & Post Bronchodilator; Future          Follow Up   No follow-ups on file.  Patient was given instructions and counseling regarding his condition or for health maintenance advice. Please see specific information pulled into the AVS if appropriate.

## 2024-08-09 ENCOUNTER — OFFICE VISIT (OUTPATIENT)
Dept: INTERNAL MEDICINE | Facility: CLINIC | Age: 49
End: 2024-08-09
Payer: COMMERCIAL

## 2024-08-09 VITALS
OXYGEN SATURATION: 98 % | WEIGHT: 189 LBS | DIASTOLIC BLOOD PRESSURE: 82 MMHG | HEIGHT: 65 IN | HEART RATE: 87 BPM | BODY MASS INDEX: 31.49 KG/M2 | RESPIRATION RATE: 18 BRPM | SYSTOLIC BLOOD PRESSURE: 140 MMHG | TEMPERATURE: 97.8 F

## 2024-08-09 DIAGNOSIS — Z12.11 SCREEN FOR COLON CANCER: ICD-10-CM

## 2024-08-09 DIAGNOSIS — G25.81 RESTLESS LEG: ICD-10-CM

## 2024-08-09 DIAGNOSIS — U07.1 COVID-19: Primary | ICD-10-CM

## 2024-08-09 DIAGNOSIS — G47.9 DIFFICULTY SLEEPING: ICD-10-CM

## 2024-08-09 DIAGNOSIS — F32.A DEPRESSION, UNSPECIFIED DEPRESSION TYPE: ICD-10-CM

## 2024-08-09 LAB
EXPIRATION DATE: ABNORMAL
FLUAV AG UPPER RESP QL IA.RAPID: NOT DETECTED
FLUBV AG UPPER RESP QL IA.RAPID: NOT DETECTED
INTERNAL CONTROL: ABNORMAL
Lab: ABNORMAL
SARS-COV-2 AG UPPER RESP QL IA.RAPID: DETECTED

## 2024-08-09 PROCEDURE — 1125F AMNT PAIN NOTED PAIN PRSNT: CPT | Performed by: STUDENT IN AN ORGANIZED HEALTH CARE EDUCATION/TRAINING PROGRAM

## 2024-08-09 PROCEDURE — 87428 SARSCOV & INF VIR A&B AG IA: CPT | Performed by: STUDENT IN AN ORGANIZED HEALTH CARE EDUCATION/TRAINING PROGRAM

## 2024-08-09 PROCEDURE — 1160F RVW MEDS BY RX/DR IN RCRD: CPT | Performed by: STUDENT IN AN ORGANIZED HEALTH CARE EDUCATION/TRAINING PROGRAM

## 2024-08-09 PROCEDURE — 99214 OFFICE O/P EST MOD 30 MIN: CPT | Performed by: STUDENT IN AN ORGANIZED HEALTH CARE EDUCATION/TRAINING PROGRAM

## 2024-08-09 PROCEDURE — 1159F MED LIST DOCD IN RCRD: CPT | Performed by: STUDENT IN AN ORGANIZED HEALTH CARE EDUCATION/TRAINING PROGRAM

## 2024-08-09 RX ORDER — ROPINIROLE 0.5 MG/1
0.5 TABLET, FILM COATED ORAL NIGHTLY
Qty: 30 TABLET | Refills: 2 | Status: SHIPPED | OUTPATIENT
Start: 2024-08-09

## 2024-08-09 RX ORDER — BUPROPION HYDROCHLORIDE 150 MG/1
150 TABLET, EXTENDED RELEASE ORAL 2 TIMES DAILY
Qty: 180 TABLET | Refills: 3 | Status: SHIPPED | OUTPATIENT
Start: 2024-08-09

## 2024-08-09 RX ORDER — BROMPHENIRAMINE MALEATE, PSEUDOEPHEDRINE HYDROCHLORIDE, AND DEXTROMETHORPHAN HYDROBROMIDE 2; 30; 10 MG/5ML; MG/5ML; MG/5ML
5 SYRUP ORAL 4 TIMES DAILY PRN
Qty: 120 ML | Refills: 0 | Status: SHIPPED | OUTPATIENT
Start: 2024-08-09 | End: 2024-08-15

## 2024-08-09 RX ORDER — PREDNISONE 20 MG/1
40 TABLET ORAL DAILY
Qty: 10 TABLET | Refills: 0 | Status: SHIPPED | OUTPATIENT
Start: 2024-08-09 | End: 2024-08-14

## 2024-08-09 NOTE — PROGRESS NOTES
Office Note     Name: Wilmer Stover    : 1975     MRN: [id number]     Chief Complaint  Follow-up and Nasal Congestion (Congestion for 3 days )    Subjective     History of Present Illness:  Wilmer Stover is a 49 y.o. male who presents today for     complains of symptoms of a URI. Symptoms include achiness, congestion, low grade fever, nasal congestion, and non productive cough. Onset of symptoms was 3 days ago, and has been gradually worsening since that time.\       Anxiety/Depression  On Wellbutrin 300mg daily, mood is stable, does not feel as depressed when he is off the medication  On Trazodone 100mg QHS, helps him sleep at night.     Restless leg syndrome  -bilateral legs, feels like they keep moving at night, disrupts him from sleeping.    Review of Systems:   Review of Systems   All other systems reviewed and are negative.      Past Medical History:   Past Medical History:   Diagnosis Date    Allergic     Anxiety     Arthritis     COPD (chronic obstructive pulmonary disease)     Depression     Gall stones     Hepatitis C     Hodgkin's lymphoma     Kidney stone     Memory loss     Neuropathy     Numbness and tingling     Thyroid disease     Weakness        Past Surgical History:   Past Surgical History:   Procedure Laterality Date    CHOLECYSTECTOMY      CYSTOSCOPY BLADDER STONE LITHOTRIPSY      MASTECTOMY MODIFIED RADICAL W/ AXILLARY LYMPH NODES W/ OR W/O PECTORALIS MINOR         Family History:   Family History   Problem Relation Age of Onset    Hypertension Mother     Hyperlipidemia Mother     Alcohol abuse Mother     Arthritis Mother     Alcohol abuse Father        Social History:   Social History     Socioeconomic History    Marital status:    Tobacco Use    Smoking status: Some Days     Current packs/day: 0.50     Types: Cigarettes    Smokeless tobacco: Never   Vaping Use    Vaping status: Never Used   Substance and Sexual Activity    Alcohol use: No    Drug use: Not  Currently     Comment: sobriety date 12/22/2022    Sexual activity: Not Currently       Immunizations:   Immunization History   Administered Date(s) Administered    Pneumococcal Polysaccharide (PPSV23) 05/06/2015    Tdap 11/23/2017, 07/05/2019        Medications:     Current Outpatient Medications:     albuterol sulfate  (90 Base) MCG/ACT inhaler, Inhale 2 puffs Every 4 (Four) Hours As Needed for Wheezing or Shortness of Air., Disp: 18 g, Rfl: 5    atorvastatin (Lipitor) 40 MG tablet, Take 1 tablet by mouth Daily., Disp: 90 tablet, Rfl: 1    buPROPion SR (WELLBUTRIN SR) 150 MG 12 hr tablet, Take 1 tablet by mouth 2 (Two) Times a Day., Disp: 180 tablet, Rfl: 3    cetirizine (zyrTEC) 10 MG tablet, Take 1 tablet by mouth Daily. For allergies, Disp: 30 tablet, Rfl: 5    doxycycline (VIBRAMYCIN) 100 MG capsule, Take 1 capsule by mouth 2 (Two) Times a Day., Disp: 14 capsule, Rfl: 0    fluticasone (FLONASE) 50 MCG/ACT nasal spray, 2 sprays into the nostril(s) as directed by provider Daily., Disp: 15.8 mL, Rfl: 0    PrilOSEC OTC 20 MG EC tablet, Take 1 tablet by mouth Every Morning., Disp: , Rfl:     traZODone (DESYREL) 100 MG tablet, Take 1 tablet by mouth every night at bedtime., Disp: 30 tablet, Rfl: 5    brompheniramine-pseudoephedrine-DM 30-2-10 MG/5ML syrup, Take 5 mL by mouth 4 (Four) Times a Day As Needed for Congestion or Cough for up to 6 days., Disp: 120 mL, Rfl: 0    ondansetron ODT (ZOFRAN-ODT) 4 MG disintegrating tablet, , Disp: , Rfl:     predniSONE (DELTASONE) 20 MG tablet, Take 2 tablets by mouth Daily for 5 days., Disp: 10 tablet, Rfl: 0    rOPINIRole (REQUIP) 0.5 MG tablet, Take 1 tablet by mouth Every Night. Take 1 hour before bedtime., Disp: 30 tablet, Rfl: 2    Allergies:   Allergies   Allergen Reactions    Penicillins Hives and Unknown (See Comments)       Objective     Vital Signs  /82 (BP Location: Left arm, Patient Position: Sitting, Cuff Size: Adult)   Pulse 87   Temp 97.8 °F (36.6  "°C) (Infrared)   Resp 18   Ht 166 cm (65.35\")   Wt 85.7 kg (189 lb)   SpO2 98%   BMI 31.11 kg/m²   Estimated body mass index is 31.11 kg/m² as calculated from the following:    Height as of this encounter: 166 cm (65.35\").    Weight as of this encounter: 85.7 kg (189 lb).            Physical Exam  Constitutional:       Appearance: Normal appearance.   HENT:      Nose: Congestion present.   Cardiovascular:      Rate and Rhythm: Normal rate and regular rhythm.      Pulses: Normal pulses.      Heart sounds: Normal heart sounds.   Pulmonary:      Effort: Pulmonary effort is normal.      Breath sounds: Normal breath sounds.   Neurological:      Mental Status: He is alert.          Result Review :                Results for orders placed or performed in visit on 08/09/24   POCT SARS-CoV-2 Antigen YAMIL + Flu    Specimen: Swab   Result Value Ref Range    SARS Antigen Detected (A) Not Detected, Presumptive Negative    Influenza A Antigen YAMIL Not Detected Not Detected    Influenza B Antigen YAMIL Not Detected Not Detected    Internal Control Passed Passed    Lot Number [id number]     Expiration Date 01/16/2025          Assessment and Plan     1. COVID-19    - POCT SARS-CoV-2 Antigen YAMIL + Flu  - predniSONE (DELTASONE) 20 MG tablet; Take 2 tablets by mouth Daily for 5 days.  Dispense: 10 tablet; Refill: 0  - brompheniramine-pseudoephedrine-DM 30-2-10 MG/5ML syrup; Take 5 mL by mouth 4 (Four) Times a Day As Needed for Congestion or Cough for up to 6 days.  Dispense: 120 mL; Refill: 0    2. Depression, unspecified depression type  Controlled, med refilled  - buPROPion SR (WELLBUTRIN SR) 150 MG 12 hr tablet; Take 1 tablet by mouth 2 (Two) Times a Day.  Dispense: 180 tablet; Refill: 3    3. Screen for colon cancer    - Ambulatory Referral For Screening Colonoscopy    4. Difficulty sleeping  Continue with trazodone    5. Restless leg    - rOPINIRole (REQUIP) 0.5 MG tablet; Take 1 tablet by mouth Every Night. Take 1 hour before " bedtime.  Dispense: 30 tablet; Refill: 2       Follow Up  No follow-ups on file.    MD [facility] [address]  [facility]  [address]  [address] [address] [phone]

## 2024-09-26 RX ORDER — SODIUM, POTASSIUM,MAG SULFATES 17.5-3.13G
2 SOLUTION, RECONSTITUTED, ORAL ORAL EVERY 12 HOURS
Qty: 354 ML | Refills: 0 | Status: SHIPPED | OUTPATIENT
Start: 2024-09-26 | End: 2024-10-07

## 2024-10-07 ENCOUNTER — OFFICE VISIT (OUTPATIENT)
Dept: INTERNAL MEDICINE | Facility: CLINIC | Age: 49
End: 2024-10-07
Payer: COMMERCIAL

## 2024-10-07 VITALS
OXYGEN SATURATION: 98 % | HEIGHT: 65 IN | HEART RATE: 75 BPM | WEIGHT: 189.2 LBS | DIASTOLIC BLOOD PRESSURE: 82 MMHG | BODY MASS INDEX: 31.52 KG/M2 | TEMPERATURE: 98.6 F | SYSTOLIC BLOOD PRESSURE: 136 MMHG

## 2024-10-07 DIAGNOSIS — M13.0 POLYARTHRITIS: ICD-10-CM

## 2024-10-07 DIAGNOSIS — E78.5 DYSLIPIDEMIA: Primary | ICD-10-CM

## 2024-10-07 DIAGNOSIS — R45.89 DEPRESSED MOOD: ICD-10-CM

## 2024-10-07 PROCEDURE — 1125F AMNT PAIN NOTED PAIN PRSNT: CPT | Performed by: STUDENT IN AN ORGANIZED HEALTH CARE EDUCATION/TRAINING PROGRAM

## 2024-10-07 PROCEDURE — 99214 OFFICE O/P EST MOD 30 MIN: CPT | Performed by: STUDENT IN AN ORGANIZED HEALTH CARE EDUCATION/TRAINING PROGRAM

## 2024-10-07 PROCEDURE — 1159F MED LIST DOCD IN RCRD: CPT | Performed by: STUDENT IN AN ORGANIZED HEALTH CARE EDUCATION/TRAINING PROGRAM

## 2024-10-07 PROCEDURE — 1160F RVW MEDS BY RX/DR IN RCRD: CPT | Performed by: STUDENT IN AN ORGANIZED HEALTH CARE EDUCATION/TRAINING PROGRAM

## 2024-10-07 RX ORDER — MELOXICAM 7.5 MG/1
7.5 TABLET ORAL DAILY
Qty: 90 TABLET | Refills: 0 | Status: SHIPPED | OUTPATIENT
Start: 2024-10-07

## 2024-10-07 RX ORDER — FLUOXETINE 10 MG/1
10 CAPSULE ORAL DAILY
Qty: 30 CAPSULE | Refills: 2 | Status: SHIPPED | OUTPATIENT
Start: 2024-10-07

## 2024-10-07 NOTE — PROGRESS NOTES
Office Note     Name: Wilmer tSover    : 1975     MRN: [id number]     Chief Complaint  Fatigue and Arthritis    Subjective     History of Present Illness:  Wilmer Stover is a 49 y.o. male who presents today for     Complaining of multiple joint pain.   Daily dull ache, in the past month he noticed it more.   He mostly complains of lower back pain, knees, ankles, wrists, no specific spots hurt more than others.   Standings makes the pain worse in his lower legs,   In the cold season, it aggravates it.   Medicines takes tylenol and alternate with ibuprofen  His full time work is at a gas station. Report he is on his feet a lot  Dose not do any structured exercise.  Has hx of chemo-radiation for lymphoma in 2015.       Difficulty with sleeping  On Trazodone 100mg QHS, helps him sleep at night.     anhedonia, depressed mood, difficulty concentrating, and fatigue. Onset was approximately a few weeks ago. Symptoms have been gradually worsening since that time. Current symptoms include: anhedonia, depressed mood, difficulty concentrating, fatigue, and hypersomnia. Patient denies feelings of worthlessness/guilt, psychomotor retardation, recurrent thoughts of death, suicidal attempt, suicidal thoughts with specific plan, and suicidal thoughts without plan. Previous treatment includes medication. He has been on wellbutrin, seroquel.       Review of Systems:   Review of Systems   All other systems reviewed and are negative.      Past Medical History:   Past Medical History:   Diagnosis Date    Allergic     Anxiety     Arthritis     COPD (chronic obstructive pulmonary disease)     Depression     Gall stones     Hepatitis C     Hodgkin's lymphoma     Kidney stone     Memory loss     Neuropathy     Numbness and tingling     Thyroid disease     Weakness        Past Surgical History:   Past Surgical History:   Procedure Laterality Date    CHOLECYSTECTOMY      CYSTOSCOPY BLADDER STONE LITHOTRIPSY       MASTECTOMY MODIFIED RADICAL W/ AXILLARY LYMPH NODES W/ OR W/O PECTORALIS MINOR         Family History:   Family History   Problem Relation Age of Onset    Hypertension Mother     Hyperlipidemia Mother     Alcohol abuse Mother     Arthritis Mother     Alcohol abuse Father        Social History:   Social History     Socioeconomic History    Marital status:    Tobacco Use    Smoking status: Some Days     Current packs/day: 0.50     Types: Cigarettes    Smokeless tobacco: Never   Vaping Use    Vaping status: Never Used   Substance and Sexual Activity    Alcohol use: No    Drug use: Not Currently     Comment: sobriety date 12/22/2022    Sexual activity: Not Currently       Immunizations:   Immunization History   Administered Date(s) Administered    COVID-19 (MODERNA) 1st,2nd,3rd Dose Monovalent 07/22/2021, 08/25/2021    Pneumococcal Polysaccharide (PPSV23) 05/06/2015    Tdap 11/23/2017, 07/05/2019        Medications:     Current Outpatient Medications:     albuterol sulfate  (90 Base) MCG/ACT inhaler, Inhale 2 puffs Every 4 (Four) Hours As Needed for Wheezing or Shortness of Air., Disp: 18 g, Rfl: 5    atorvastatin (Lipitor) 40 MG tablet, Take 1 tablet by mouth Daily., Disp: 90 tablet, Rfl: 1    cetirizine (zyrTEC) 10 MG tablet, Take 1 tablet by mouth Daily. For allergies, Disp: 30 tablet, Rfl: 5    fluticasone (FLONASE) 50 MCG/ACT nasal spray, 2 sprays into the nostril(s) as directed by provider Daily., Disp: 15.8 mL, Rfl: 0    PrilOSEC OTC 20 MG EC tablet, Take 1 tablet by mouth Every Morning., Disp: , Rfl:     traZODone (DESYREL) 100 MG tablet, Take 1 tablet by mouth every night at bedtime., Disp: 30 tablet, Rfl: 5    Diclofenac Sodium (VOLTAREN) 1 % gel gel, Apply 4 g topically to the appropriate area as directed 4 (Four) Times a Day As Needed (joint pain)., Disp: 350 g, Rfl: 0    FLUoxetine (PROzac) 10 MG capsule, Take 1 capsule by mouth Daily., Disp: 30 capsule, Rfl: 2    meloxicam (Mobic) 7.5 MG  "tablet, Take 1 tablet by mouth Daily., Disp: 90 tablet, Rfl: 0    Allergies:   Allergies   Allergen Reactions    Penicillins Hives and Unknown (See Comments)       Objective     Vital Signs  /82   Pulse 75   Temp 98.6 °F (37 °C) (Temporal)   Ht 166 cm (65.35\")   Wt 85.8 kg (189 lb 3.2 oz)   SpO2 98%   BMI 31.14 kg/m²   Estimated body mass index is 31.14 kg/m² as calculated from the following:    Height as of this encounter: 166 cm (65.35\").    Weight as of this encounter: 85.8 kg (189 lb 3.2 oz).            Physical Exam  Constitutional:       Appearance: Normal appearance.   Cardiovascular:      Rate and Rhythm: Normal rate and regular rhythm.      Pulses: Normal pulses.      Heart sounds: Normal heart sounds.   Pulmonary:      Effort: Pulmonary effort is normal.      Breath sounds: Normal breath sounds.   Neurological:      Mental Status: He is alert.          Result Review :                  Assessment and Plan     1. Dyslipidemia  Continue with lipitor  - Lipid panel; Future    2. Polyarthritis    - MAMADOU by IFA, Reflex 9-biomarkers profile; Future  - Rheumatoid Factor, Quant; Future  - Vitamin B12; Future  - Folate; Future  - HLA-B27 Antigen; Future  - Diclofenac Sodium (VOLTAREN) 1 % gel gel; Apply 4 g topically to the appropriate area as directed 4 (Four) Times a Day As Needed (joint pain).  Dispense: 350 g; Refill: 0  - meloxicam (Mobic) 7.5 MG tablet; Take 1 tablet by mouth Daily.  Dispense: 90 tablet; Refill: 0    3. Depressed mood  Start SSRI.  Advised may take 4-6 weeks to notice an effect.  Discussed potential side effects including headache, dizziness, GI symptoms, sexual side effects,  worsening mood or suicidal ideations.  Patient advised to call the office if develops any side effects or has questions. Reassess in one month.     - FLUoxetine (PROzac) 10 MG capsule; Take 1 capsule by mouth Daily.  Dispense: 30 capsule; Refill: 2       Follow Up  Return in about 4 weeks (around 11/4/2024) " for Recheck.    MD [facility] [address]  [facility]  [address]  [address] [address] [phone]

## 2024-10-09 ENCOUNTER — LAB (OUTPATIENT)
Dept: INTERNAL MEDICINE | Facility: CLINIC | Age: 49
End: 2024-10-09
Payer: COMMERCIAL

## 2024-10-09 DIAGNOSIS — M13.0 POLYARTHRITIS: ICD-10-CM

## 2024-10-09 DIAGNOSIS — E78.5 DYSLIPIDEMIA: ICD-10-CM

## 2024-10-09 LAB
CHOLEST SERPL-MCNC: 312 MG/DL (ref 0–200)
CHROMATIN AB SERPL-ACNC: <10 IU/ML (ref 0–14)
FOLATE SERPL-MCNC: 12.7 NG/ML (ref 4.78–24.2)
HDLC SERPL-MCNC: 34 MG/DL (ref 40–60)
LDLC SERPL CALC-MCNC: 247 MG/DL (ref 0–100)
LDLC/HDLC SERPL: 7.24 {RATIO}
TRIGL SERPL-MCNC: 160 MG/DL (ref 0–150)
VIT B12 BLD-MCNC: 574 PG/ML (ref 211–946)
VLDLC SERPL-MCNC: 31 MG/DL (ref 5–40)

## 2024-10-09 PROCEDURE — 86235 NUCLEAR ANTIGEN ANTIBODY: CPT | Performed by: STUDENT IN AN ORGANIZED HEALTH CARE EDUCATION/TRAINING PROGRAM

## 2024-10-09 PROCEDURE — 86038 ANTINUCLEAR ANTIBODIES: CPT | Performed by: STUDENT IN AN ORGANIZED HEALTH CARE EDUCATION/TRAINING PROGRAM

## 2024-10-09 PROCEDURE — 80061 LIPID PANEL: CPT | Performed by: STUDENT IN AN ORGANIZED HEALTH CARE EDUCATION/TRAINING PROGRAM

## 2024-10-09 PROCEDURE — 86431 RHEUMATOID FACTOR QUANT: CPT | Performed by: STUDENT IN AN ORGANIZED HEALTH CARE EDUCATION/TRAINING PROGRAM

## 2024-10-09 PROCEDURE — 36415 COLL VENOUS BLD VENIPUNCTURE: CPT | Performed by: STUDENT IN AN ORGANIZED HEALTH CARE EDUCATION/TRAINING PROGRAM

## 2024-10-09 PROCEDURE — 82607 VITAMIN B-12: CPT | Performed by: STUDENT IN AN ORGANIZED HEALTH CARE EDUCATION/TRAINING PROGRAM

## 2024-10-09 PROCEDURE — 81374 HLA I TYPING 1 ANTIGEN LR: CPT | Performed by: STUDENT IN AN ORGANIZED HEALTH CARE EDUCATION/TRAINING PROGRAM

## 2024-10-09 PROCEDURE — 82746 ASSAY OF FOLIC ACID SERUM: CPT | Performed by: STUDENT IN AN ORGANIZED HEALTH CARE EDUCATION/TRAINING PROGRAM

## 2024-10-09 PROCEDURE — 86225 DNA ANTIBODY NATIVE: CPT | Performed by: STUDENT IN AN ORGANIZED HEALTH CARE EDUCATION/TRAINING PROGRAM

## 2024-10-10 ENCOUNTER — OUTSIDE FACILITY SERVICE (OUTPATIENT)
Dept: GASTROENTEROLOGY | Facility: CLINIC | Age: 49
End: 2024-10-10
Payer: COMMERCIAL

## 2024-10-11 LAB
ANA SER QL IF: POSITIVE
ANA SPECKLED TITR SER: NORMAL {TITER}
CENTROMERE B AB SER-ACNC: <0.2 AI (ref 0–0.9)
CHROMATIN AB SERPL-ACNC: <0.2 AI (ref 0–0.9)
DSDNA AB SER-ACNC: <1 IU/ML (ref 0–9)
ENA JO1 AB SER-ACNC: <0.2 AI (ref 0–0.9)
ENA RNP AB SER-ACNC: <0.2 AI (ref 0–0.9)
ENA SCL70 AB SER-ACNC: <0.2 AI (ref 0–0.9)
ENA SM AB SER-ACNC: <0.2 AI (ref 0–0.9)
ENA SS-A AB SER-ACNC: 0.2 AI (ref 0–0.9)
ENA SS-B AB SER-ACNC: <0.2 AI (ref 0–0.9)
LABORATORY COMMENT REPORT: ABNORMAL
Lab: NORMAL
Lab: NORMAL

## 2024-10-15 LAB — HLA-B27 QL NAA+PROBE: NEGATIVE

## 2024-10-22 ENCOUNTER — TELEPHONE (OUTPATIENT)
Dept: INTERNAL MEDICINE | Facility: CLINIC | Age: 49
End: 2024-10-22
Payer: COMMERCIAL

## 2024-10-22 DIAGNOSIS — M13.0 POLYARTHRITIS: Primary | ICD-10-CM

## 2024-10-22 NOTE — TELEPHONE ENCOUNTER
Name: Wilmer Stover    Relationship: Self    Best Callback Number:     HUB PROVIDED THE RELAY MESSAGE FROM THE OFFICE   PATIENT VOICED UNDERSTANDING AND HAS NO FURTHER QUESTIONS AT THIS TIME

## 2024-10-22 NOTE — TELEPHONE ENCOUNTER
----- Message from Mata Maher sent at 10/22/2024  1:48 AM EDT -----  Please let patient know I want him to repeat a couple labs before his next appointment, I want to recheck his  cholesterol levels and his autoimmune markers, he should do these about 3-4 days before the appointment. He needs to be fasting.

## 2024-10-22 NOTE — TELEPHONE ENCOUNTER
HUB to relay:  Please let patient know I want him to repeat a couple labs before his next appointment, I want to recheck his  cholesterol levels and his autoimmune markers, he should do these about 3-4 days before the appointment. He needs to be fasting.

## 2024-10-31 ENCOUNTER — LAB (OUTPATIENT)
Dept: INTERNAL MEDICINE | Facility: CLINIC | Age: 49
End: 2024-10-31
Payer: COMMERCIAL

## 2024-10-31 DIAGNOSIS — M13.0 POLYARTHRITIS: ICD-10-CM

## 2024-10-31 PROCEDURE — 86376 MICROSOMAL ANTIBODY EACH: CPT | Performed by: STUDENT IN AN ORGANIZED HEALTH CARE EDUCATION/TRAINING PROGRAM

## 2024-10-31 PROCEDURE — 84439 ASSAY OF FREE THYROXINE: CPT | Performed by: STUDENT IN AN ORGANIZED HEALTH CARE EDUCATION/TRAINING PROGRAM

## 2024-10-31 PROCEDURE — 36415 COLL VENOUS BLD VENIPUNCTURE: CPT | Performed by: STUDENT IN AN ORGANIZED HEALTH CARE EDUCATION/TRAINING PROGRAM

## 2024-10-31 PROCEDURE — 84443 ASSAY THYROID STIM HORMONE: CPT | Performed by: STUDENT IN AN ORGANIZED HEALTH CARE EDUCATION/TRAINING PROGRAM

## 2024-10-31 PROCEDURE — 86038 ANTINUCLEAR ANTIBODIES: CPT | Performed by: STUDENT IN AN ORGANIZED HEALTH CARE EDUCATION/TRAINING PROGRAM

## 2024-10-31 PROCEDURE — 86431 RHEUMATOID FACTOR QUANT: CPT | Performed by: STUDENT IN AN ORGANIZED HEALTH CARE EDUCATION/TRAINING PROGRAM

## 2024-10-31 PROCEDURE — 80061 LIPID PANEL: CPT | Performed by: STUDENT IN AN ORGANIZED HEALTH CARE EDUCATION/TRAINING PROGRAM

## 2024-11-01 LAB
CHOLEST SERPL-MCNC: 303 MG/DL (ref 0–200)
CHROMATIN AB SERPL-ACNC: <10 IU/ML (ref 0–14)
HDLC SERPL-MCNC: 31 MG/DL (ref 40–60)
LDLC SERPL CALC-MCNC: 244 MG/DL (ref 0–100)
LDLC/HDLC SERPL: 7.83 {RATIO}
T4 FREE SERPL-MCNC: 1.19 NG/DL (ref 0.92–1.68)
TRIGL SERPL-MCNC: 147 MG/DL (ref 0–150)
TSH SERPL DL<=0.05 MIU/L-ACNC: 3.76 UIU/ML (ref 0.27–4.2)
VLDLC SERPL-MCNC: 28 MG/DL (ref 5–40)

## 2024-11-02 LAB — THYROPEROXIDASE AB SERPL-ACNC: 17 IU/ML (ref 0–34)

## 2024-11-04 ENCOUNTER — OFFICE VISIT (OUTPATIENT)
Dept: INTERNAL MEDICINE | Facility: CLINIC | Age: 49
End: 2024-11-04
Payer: COMMERCIAL

## 2024-11-04 VITALS
OXYGEN SATURATION: 97 % | HEIGHT: 65 IN | WEIGHT: 186.2 LBS | DIASTOLIC BLOOD PRESSURE: 86 MMHG | TEMPERATURE: 98 F | BODY MASS INDEX: 31.02 KG/M2 | SYSTOLIC BLOOD PRESSURE: 130 MMHG | HEART RATE: 91 BPM

## 2024-11-04 DIAGNOSIS — G25.81 RESTLESS LEG: ICD-10-CM

## 2024-11-04 DIAGNOSIS — M13.0 POLYARTHRITIS: ICD-10-CM

## 2024-11-04 DIAGNOSIS — E78.5 DYSLIPIDEMIA: ICD-10-CM

## 2024-11-04 DIAGNOSIS — L30.9 DERMATITIS: Primary | ICD-10-CM

## 2024-11-04 DIAGNOSIS — R76.8 POSITIVE ANA (ANTINUCLEAR ANTIBODY): ICD-10-CM

## 2024-11-04 LAB
ANA SER QL IF: NEGATIVE
LABORATORY COMMENT REPORT: NORMAL

## 2024-11-04 PROCEDURE — 99214 OFFICE O/P EST MOD 30 MIN: CPT | Performed by: STUDENT IN AN ORGANIZED HEALTH CARE EDUCATION/TRAINING PROGRAM

## 2024-11-04 PROCEDURE — 1125F AMNT PAIN NOTED PAIN PRSNT: CPT | Performed by: STUDENT IN AN ORGANIZED HEALTH CARE EDUCATION/TRAINING PROGRAM

## 2024-11-04 PROCEDURE — 1159F MED LIST DOCD IN RCRD: CPT | Performed by: STUDENT IN AN ORGANIZED HEALTH CARE EDUCATION/TRAINING PROGRAM

## 2024-11-04 PROCEDURE — 1160F RVW MEDS BY RX/DR IN RCRD: CPT | Performed by: STUDENT IN AN ORGANIZED HEALTH CARE EDUCATION/TRAINING PROGRAM

## 2024-11-04 RX ORDER — ROPINIROLE 0.5 MG/1
0.5 TABLET, FILM COATED ORAL NIGHTLY
Qty: 90 TABLET | Refills: 1 | Status: SHIPPED | OUTPATIENT
Start: 2024-11-04

## 2024-11-04 RX ORDER — TRIAMCINOLONE ACETONIDE 1 MG/G
1 OINTMENT TOPICAL 2 TIMES DAILY
Qty: 80 G | Refills: 2 | Status: SHIPPED | OUTPATIENT
Start: 2024-11-04

## 2024-11-04 RX ORDER — ROSUVASTATIN CALCIUM 10 MG/1
10 TABLET, COATED ORAL DAILY
Qty: 90 TABLET | Refills: 2 | Status: SHIPPED | OUTPATIENT
Start: 2024-11-04

## 2024-11-04 NOTE — PROGRESS NOTES
Office Note     Name: Wilmer Stover    : 1975     MRN: [id number]     Chief Complaint  Anxiety (F/u )    Subjective     History of Present Illness:  Wilmer Stover is a 49 y.o. male who presents today for \    Dyslipidemia  Was on Lipitor in the past but it was given him myalgias, so he stopped.     Complaining of multiple joint pain.   Daily dull ache, in the past month he noticed it more.   He mostly complains of lower back pain, knees, ankles, wrists, no specific spots hurt more than others.   Standings makes the pain worse in his lower legs,   In the cold season, it aggravates it.     Has difficulties with a feeling of his leg moving constantly throughout the night, this has been a problem for the past year, gradually worsening.    complains of a rash. Location hands and arms. Symptoms began a few months ago. Patient describes the rash as scattered, localized, plaques. Characteristics of rash and associated history: Similar rash in the past? yes, Alleviating factors? yes, Aggravating factors? yes.     Review of Systems:   Review of Systems   All other systems reviewed and are negative.      Past Medical History:   Past Medical History:   Diagnosis Date    Allergic     Anxiety     Arthritis     COPD (chronic obstructive pulmonary disease)     Depression     Gall stones     Hepatitis C     Hodgkin's lymphoma     Kidney stone     Memory loss     Neuropathy     Numbness and tingling     Thyroid disease     Weakness        Past Surgical History:   Past Surgical History:   Procedure Laterality Date    CHOLECYSTECTOMY      CYSTOSCOPY BLADDER STONE LITHOTRIPSY      MASTECTOMY MODIFIED RADICAL W/ AXILLARY LYMPH NODES W/ OR W/O PECTORALIS MINOR         Family History:   Family History   Problem Relation Age of Onset    Hypertension Mother     Hyperlipidemia Mother     Alcohol abuse Mother     Arthritis Mother     Alcohol abuse Father        Social History:   Social History     Socioeconomic  History    Marital status:    Tobacco Use    Smoking status: Some Days     Current packs/day: 0.50     Types: Cigarettes    Smokeless tobacco: Never   Vaping Use    Vaping status: Never Used   Substance and Sexual Activity    Alcohol use: No    Drug use: Not Currently     Comment: sobriety date 12/22/2022    Sexual activity: Not Currently       Immunizations:   Immunization History   Administered Date(s) Administered    COVID-19 (MODERNA) 1st,2nd,3rd Dose Monovalent 07/22/2021, 08/25/2021    Pneumococcal Polysaccharide (PPSV23) 05/06/2015    Tdap 11/23/2017, 07/05/2019        Medications:     Current Outpatient Medications:     albuterol sulfate  (90 Base) MCG/ACT inhaler, Inhale 2 puffs Every 4 (Four) Hours As Needed for Wheezing or Shortness of Air., Disp: 18 g, Rfl: 5    cetirizine (zyrTEC) 10 MG tablet, Take 1 tablet by mouth Daily. For allergies, Disp: 30 tablet, Rfl: 5    FLUoxetine (PROzac) 10 MG capsule, Take 1 capsule by mouth Daily., Disp: 30 capsule, Rfl: 2    fluticasone (FLONASE) 50 MCG/ACT nasal spray, 2 sprays into the nostril(s) as directed by provider Daily., Disp: 15.8 mL, Rfl: 0    meloxicam (Mobic) 7.5 MG tablet, Take 1 tablet by mouth Daily., Disp: 90 tablet, Rfl: 0    PrilOSEC OTC 20 MG EC tablet, Take 1 tablet by mouth Every Morning., Disp: , Rfl:     traZODone (DESYREL) 100 MG tablet, Take 1 tablet by mouth every night at bedtime., Disp: 30 tablet, Rfl: 5    Diclofenac Sodium (VOLTAREN) 1 % gel gel, Apply 4 g topically to the appropriate area as directed 4 (Four) Times a Day As Needed (joint pain). (Patient not taking: Reported on 11/4/2024), Disp: 350 g, Rfl: 0    rOPINIRole (REQUIP) 0.5 MG tablet, Take 1 tablet by mouth Every Night. Take 1 hour before bedtime., Disp: 90 tablet, Rfl: 1    rosuvastatin (Crestor) 10 MG tablet, Take 1 tablet by mouth Daily., Disp: 90 tablet, Rfl: 2    triamcinolone (KENALOG) 0.1 % ointment, Apply 1 Application topically to the appropriate area as  "directed 2 (Two) Times a Day., Disp: 80 g, Rfl: 2    Allergies:   Allergies   Allergen Reactions    Penicillins Hives and Unknown (See Comments)       Objective     Vital Signs  /86   Pulse 91   Temp 98 °F (36.7 °C) (Temporal)   Ht 166 cm (65.35\")   Wt 84.5 kg (186 lb 3.2 oz)   SpO2 97%   BMI 30.65 kg/m²   Estimated body mass index is 30.65 kg/m² as calculated from the following:    Height as of this encounter: 166 cm (65.35\").    Weight as of this encounter: 84.5 kg (186 lb 3.2 oz).            Physical Exam  Constitutional:       Appearance: Normal appearance.   Cardiovascular:      Rate and Rhythm: Normal rate and regular rhythm.      Pulses: Normal pulses.      Heart sounds: Normal heart sounds.   Pulmonary:      Effort: Pulmonary effort is normal.      Breath sounds: Normal breath sounds.   Skin:     Findings: Rash present.   Neurological:      Mental Status: He is alert.          Result Review :     Common labs          3/28/2024    14:00 10/9/2024    10:48 10/31/2024    11:01   Common Labs   Glucose 75      BUN 8      Creatinine 1.06      Sodium 138      Potassium 4.2      Chloride 103      Calcium 9.4      Albumin 4.6      Total Bilirubin 0.3      Alkaline Phosphatase 100      AST (SGOT) 27      ALT (SGPT) 18      Total Cholesterol 274  312  303    Triglycerides 146  160  147    HDL Cholesterol 30  34  31    LDL Cholesterol  216  247  244    Hemoglobin A1C 5.60            MAMADOU          10/31/2024    11:01   Common Labs   MAMADOU Negative           MAMADOU by IFA, Reflex 9-biomarkers profile  Order: [id number]  Status: Final result       Visible to patient: Yes (not seen)       Next appt: 12/16/2024 at 11:30 AM in Gastroenterology (Opal New MD)       Dx: Polyarthritis    Specimen Information: Arm, Right; Blood   1 Result Note       1 Follow-up Encounter      Component 1 mo ago   MAMADOU Positive Abnormal    Comment:                                      Negative   <1:80                                       " Borderline  1:80                                       Positive   >1:80   Please note Comment   Comment: MAMADOU Multiplex methodology was designed to detect up to 11 antibodies  of the 100+ antibodies that may be detected by MAMADOU IFA methodology.   Resulting Agency [facility]              Narrative  Performed by: [facility]  Performed at:  01 - [facility] [address]  [id number]  : Cameron Gleason PhD, Phone:  [phone]   Specimen Collected: 10/09/24 10:48 EDT Last Resulted: 10/11/24 14:12 EDT        Lab Flowsheet          Order Details          View Encounter          Lab and Collection Details          Routing          Result History       View All Conversations on this Encounter             Assessment and Plan     1. Polyarthritis    - Ambulatory Referral to Rheumatology    2. Positive MAMADOU (antinuclear antibody)    - Ambulatory Referral to Rheumatology    3. Dermatitis    - triamcinolone (KENALOG) 0.1 % ointment; Apply 1 Application topically to the appropriate area as directed 2 (Two) Times a Day.  Dispense: 80 g; Refill: 2    4. Dyslipidemia  Was intolerant to lipitor, causing myalgias and joint pain  Will trial a different Statin.  Start crestor  - CT Cardiac Calcium Score Without Dye; Future  - rosuvastatin (Crestor) 10 MG tablet; Take 1 tablet by mouth Daily.  Dispense: 90 tablet; Refill: 2    5. Restless leg    - rOPINIRole (REQUIP) 0.5 MG tablet; Take 1 tablet by mouth Every Night. Take 1 hour before bedtime.  Dispense: 90 tablet; Refill: 1       Follow Up  Return in about 3 months (around 2/4/2025).    Mata Maher MD  [facility] [address]  [facility]  [address] [facility]  [address] [address] [phone]

## 2024-12-09 RX ORDER — SODIUM, POTASSIUM,MAG SULFATES 17.5-3.13G
1 SOLUTION, RECONSTITUTED, ORAL ORAL TAKE AS DIRECTED
Qty: 354 ML | Refills: 0 | Status: SHIPPED | OUTPATIENT
Start: 2024-12-09

## 2025-01-04 DIAGNOSIS — M13.0 POLYARTHRITIS: ICD-10-CM

## 2025-01-04 DIAGNOSIS — R45.89 DEPRESSED MOOD: ICD-10-CM

## 2025-01-07 RX ORDER — MELOXICAM 7.5 MG/1
7.5 TABLET ORAL DAILY
Qty: 90 TABLET | Refills: 0 | Status: SHIPPED | OUTPATIENT
Start: 2025-01-07

## 2025-01-07 RX ORDER — FLUOXETINE 10 MG/1
10 CAPSULE ORAL DAILY
Qty: 30 CAPSULE | Refills: 2 | Status: SHIPPED | OUTPATIENT
Start: 2025-01-07

## 2025-02-05 ENCOUNTER — OFFICE VISIT (OUTPATIENT)
Dept: INTERNAL MEDICINE | Facility: CLINIC | Age: 50
End: 2025-02-05

## 2025-02-05 VITALS
HEIGHT: 65 IN | TEMPERATURE: 98.2 F | HEART RATE: 97 BPM | WEIGHT: 192.2 LBS | SYSTOLIC BLOOD PRESSURE: 130 MMHG | DIASTOLIC BLOOD PRESSURE: 80 MMHG | OXYGEN SATURATION: 96 % | BODY MASS INDEX: 32.02 KG/M2

## 2025-02-05 DIAGNOSIS — Z82.49 FAMILY HISTORY OF MI (MYOCARDIAL INFARCTION): ICD-10-CM

## 2025-02-05 DIAGNOSIS — R45.89 DEPRESSED MOOD: ICD-10-CM

## 2025-02-05 DIAGNOSIS — R09.89 CHEST CONGESTION: ICD-10-CM

## 2025-02-05 DIAGNOSIS — E78.5 DYSLIPIDEMIA: Primary | ICD-10-CM

## 2025-02-05 DIAGNOSIS — R33.9 INCOMPLETE EMPTYING OF BLADDER: ICD-10-CM

## 2025-02-05 LAB
EXPIRATION DATE: NORMAL
FLUAV AG UPPER RESP QL IA.RAPID: NOT DETECTED
FLUBV AG UPPER RESP QL IA.RAPID: NOT DETECTED
INTERNAL CONTROL: NORMAL
Lab: NORMAL
SARS-COV-2 AG UPPER RESP QL IA.RAPID: NOT DETECTED

## 2025-02-05 PROCEDURE — 99214 OFFICE O/P EST MOD 30 MIN: CPT | Performed by: STUDENT IN AN ORGANIZED HEALTH CARE EDUCATION/TRAINING PROGRAM

## 2025-02-05 PROCEDURE — 87428 SARSCOV & INF VIR A&B AG IA: CPT | Performed by: STUDENT IN AN ORGANIZED HEALTH CARE EDUCATION/TRAINING PROGRAM

## 2025-02-05 RX ORDER — FLUOXETINE 10 MG/1
10 CAPSULE ORAL DAILY
Qty: 90 CAPSULE | Refills: 3 | Status: SHIPPED | OUTPATIENT
Start: 2025-02-05

## 2025-02-05 RX ORDER — TAMSULOSIN HYDROCHLORIDE 0.4 MG/1
1 CAPSULE ORAL
Qty: 90 CAPSULE | Refills: 1 | Status: SHIPPED | OUTPATIENT
Start: 2025-02-05

## 2025-02-05 RX ORDER — PREDNISONE 20 MG/1
40 TABLET ORAL DAILY
Qty: 10 TABLET | Refills: 0 | Status: SHIPPED | OUTPATIENT
Start: 2025-02-05 | End: 2025-02-10

## 2025-02-05 RX ORDER — DOXYCYCLINE 100 MG/1
100 CAPSULE ORAL 2 TIMES DAILY
Qty: 14 CAPSULE | Refills: 0 | Status: SHIPPED | OUTPATIENT
Start: 2025-02-05 | End: 2025-02-12

## 2025-02-05 RX ORDER — ROPINIROLE 0.5 MG/1
0.5 TABLET, FILM COATED ORAL NIGHTLY
Qty: 90 TABLET | Refills: 1 | Status: CANCELLED | OUTPATIENT
Start: 2025-02-05

## 2025-02-05 RX ORDER — ROSUVASTATIN CALCIUM 10 MG/1
10 TABLET, COATED ORAL DAILY
Qty: 90 TABLET | Refills: 3 | Status: SHIPPED | OUTPATIENT
Start: 2025-02-05

## 2025-02-05 NOTE — PROGRESS NOTES
Office Note     Name: Wilmer Stover    : 1975     MRN: [id number]     Chief Complaint  URI (Inflammation in chest had prednisone about a week ago from [facility] ) and Anxiety    Subjective     History of Present Illness:  Wilmer Stover is a 49 y.o. male who presents today for     complains of symptoms of a URI. Symptoms include achiness, congestion, post nasal drip, and productive cough with  yellow colored sputum. Onset of symptoms was a few days ago, and has been gradually worsening since that time.       Incomplete bladder emptying and nocturia, has been on going for the past few months. Waking up 2-3x a night. He feels restless at night, but once he is able to use the bathroom he feels better.       Dyslipidemia  On crestor, has decreased in muscle cramping.    Depression: on prozac 10mg, reports it has been helping him, denies any worsening depression or anxiety  His mom recently passed away, she  of MI.      Review of Systems:   Review of Systems   All other systems reviewed and are negative.      Past Medical History:   Past Medical History:   Diagnosis Date    Allergic     Anxiety     Arthritis     COPD (chronic obstructive pulmonary disease)     Depression     Gall stones     Hepatitis C     Hodgkin's lymphoma     Kidney stone     Memory loss     Neuropathy     Numbness and tingling     Thyroid disease     Weakness        Past Surgical History:   Past Surgical History:   Procedure Laterality Date    CHOLECYSTECTOMY      CYSTOSCOPY BLADDER STONE LITHOTRIPSY      MASTECTOMY MODIFIED RADICAL W/ AXILLARY LYMPH NODES W/ OR W/O PECTORALIS MINOR         Family History:   Family History   Problem Relation Age of Onset    Hypertension Mother     Hyperlipidemia Mother     Alcohol abuse Mother     Arthritis Mother     Alcohol abuse Father        Social History:   Social History     Socioeconomic History    Marital status:    Tobacco Use    Smoking status: Some Days      Current packs/day: 0.50     Types: Cigarettes    Smokeless tobacco: Never   Vaping Use    Vaping status: Never Used   Substance and Sexual Activity    Alcohol use: No    Drug use: Not Currently     Comment: sobriety date 12/22/2022    Sexual activity: Not Currently       Immunizations:   Immunization History   Administered Date(s) Administered    COVID-19 (MODERNA) 1st,2nd,3rd Dose Monovalent 07/22/2021, 08/25/2021    Pneumococcal Polysaccharide (PPSV23) 05/06/2015    Tdap 11/23/2017, 07/05/2019        Medications:     Current Outpatient Medications:     albuterol sulfate  (90 Base) MCG/ACT inhaler, Inhale 2 puffs Every 4 (Four) Hours As Needed for Wheezing or Shortness of Air., Disp: 18 g, Rfl: 5    Diclofenac Sodium (VOLTAREN) 1 % gel gel, Apply 4 g topically to the appropriate area as directed 4 (Four) Times a Day As Needed (joint pain)., Disp: 350 g, Rfl: 0    FLUoxetine (PROzac) 10 MG capsule, Take 1 capsule by mouth Daily., Disp: 90 capsule, Rfl: 3    fluticasone (FLONASE) 50 MCG/ACT nasal spray, 2 sprays into the nostril(s) as directed by provider Daily., Disp: 15.8 mL, Rfl: 0    meloxicam (MOBIC) 7.5 MG tablet, TAKE 1 TABLET BY MOUTH DAILY, Disp: 90 tablet, Rfl: 0    PrilOSEC OTC 20 MG EC tablet, Take 1 tablet by mouth Every Morning., Disp: , Rfl:     rOPINIRole (REQUIP) 0.5 MG tablet, Take 1 tablet by mouth Every Night. Take 1 hour before bedtime., Disp: 90 tablet, Rfl: 1    rosuvastatin (Crestor) 10 MG tablet, Take 1 tablet by mouth Daily., Disp: 90 tablet, Rfl: 3    sodium-potassium-magnesium sulfates (Suprep Bowel Prep Kit) 17.5-3.13-1.6 GM/177ML solution oral solution, Take 1 bottle by mouth Take As Directed., Disp: 354 mL, Rfl: 0    traZODone (DESYREL) 100 MG tablet, Take 1 tablet by mouth every night at bedtime., Disp: 30 tablet, Rfl: 5    triamcinolone (KENALOG) 0.1 % ointment, Apply 1 Application topically to the appropriate area as directed 2 (Two) Times a Day., Disp: 80 g, Rfl: 2     "cetirizine (zyrTEC) 10 MG tablet, Take 1 tablet by mouth Daily. For allergies (Patient not taking: Reported on 2/5/2025), Disp: 30 tablet, Rfl: 5    tamsulosin (FLOMAX) 0.4 MG capsule 24 hr capsule, Take 1 capsule by mouth every night at bedtime., Disp: 90 capsule, Rfl: 1    Allergies:   Allergies   Allergen Reactions    Penicillins Hives and Unknown (See Comments)       Objective     Vital Signs  /80   Pulse 97   Temp 98.2 °F (36.8 °C) (Temporal)   Ht 166 cm (65.35\")   Wt 87.2 kg (192 lb 3.2 oz)   SpO2 96%   BMI 31.64 kg/m²   Estimated body mass index is 31.64 kg/m² as calculated from the following:    Height as of this encounter: 166 cm (65.35\").    Weight as of this encounter: 87.2 kg (192 lb 3.2 oz).            Physical Exam  Constitutional:       Appearance: Normal appearance.   Cardiovascular:      Rate and Rhythm: Normal rate and regular rhythm.      Pulses: Normal pulses.      Heart sounds: Normal heart sounds.   Pulmonary:      Effort: Pulmonary effort is normal.      Breath sounds: Normal breath sounds.   Neurological:      Mental Status: He is alert.          Result Review :     Common labs          3/28/2024    14:00 10/9/2024    10:48 10/31/2024    11:01   Common Labs   Glucose 75      BUN 8      Creatinine 1.06      Sodium 138      Potassium 4.2      Chloride 103      Calcium 9.4      Albumin 4.6      Total Bilirubin 0.3      Alkaline Phosphatase 100      AST (SGOT) 27      ALT (SGPT) 18      Total Cholesterol 274  312  303    Triglycerides 146  160  147    HDL Cholesterol 30  34  31    LDL Cholesterol  216  247  244    Hemoglobin A1C 5.60                   Results for orders placed or performed in visit on 02/05/25   POCT SARS-CoV-2 Antigen YAMIL + Flu    Collection Time: 02/05/25  1:39 PM    Specimen: Swab   Result Value Ref Range    SARS Antigen Not Detected Not Detected, Presumptive Negative    Influenza A Antigen YAMIL Not Detected Not Detected    Influenza B Antigen YAMIL Not Detected Not " Detected    Internal Control Passed Passed    Lot Number [id number]     Expiration Date 11/14/25        Assessment and Plan     1. Dyslipidemia  Continue with Crestor  Was intolerant to Lipitor  Recommend to undergo CT calcium score due to hx of heart disease in his family and elevated cholesterol levels   - rosuvastatin (Crestor) 10 MG tablet; Take 1 tablet by mouth Daily.  Dispense: 90 tablet; Refill: 3  - CT Cardiac Calcium Score Without Dye; Future    2. Depressed mood  Stable  Continue with prozac 10mg daily  - FLUoxetine (PROzac) 10 MG capsule; Take 1 capsule by mouth Daily.  Dispense: 90 capsule; Refill: 3    3. Incomplete emptying of bladder  Flomax daily    4. Chest congestion    - POCT SARS-CoV-2 Antigen YAMIL + Flu    5. Family history of MI (myocardial infarction)    - CT Cardiac Calcium Score Without Dye; Future       Follow Up  Return in about 6 months (around 8/5/2025) for Recheck.    Mata Maher MD  [facility] [address]  [facility]  [address]  [address] [address] [phone]

## 2025-02-05 NOTE — PATIENT INSTRUCTIONS
Rheumatology Arthritis.   [address] [facility], [address], [place] [id number] [phone]

## 2025-06-13 ENCOUNTER — OFFICE VISIT (OUTPATIENT)
Dept: INTERNAL MEDICINE | Facility: CLINIC | Age: 50
End: 2025-06-13
Payer: MEDICAID

## 2025-06-13 VITALS
WEIGHT: 190.6 LBS | SYSTOLIC BLOOD PRESSURE: 150 MMHG | BODY MASS INDEX: 31.75 KG/M2 | TEMPERATURE: 97.5 F | HEIGHT: 65 IN | HEART RATE: 88 BPM | DIASTOLIC BLOOD PRESSURE: 80 MMHG | OXYGEN SATURATION: 98 %

## 2025-06-13 DIAGNOSIS — I10 HYPERTENSION, UNSPECIFIED TYPE: Primary | ICD-10-CM

## 2025-06-13 DIAGNOSIS — R45.89 DEPRESSED MOOD: ICD-10-CM

## 2025-06-13 DIAGNOSIS — G47.9 DIFFICULTY SLEEPING: ICD-10-CM

## 2025-06-13 DIAGNOSIS — J30.2 SEASONAL ALLERGIES: ICD-10-CM

## 2025-06-13 PROCEDURE — 1159F MED LIST DOCD IN RCRD: CPT | Performed by: STUDENT IN AN ORGANIZED HEALTH CARE EDUCATION/TRAINING PROGRAM

## 2025-06-13 PROCEDURE — 1160F RVW MEDS BY RX/DR IN RCRD: CPT | Performed by: STUDENT IN AN ORGANIZED HEALTH CARE EDUCATION/TRAINING PROGRAM

## 2025-06-13 PROCEDURE — 99214 OFFICE O/P EST MOD 30 MIN: CPT | Performed by: STUDENT IN AN ORGANIZED HEALTH CARE EDUCATION/TRAINING PROGRAM

## 2025-06-13 PROCEDURE — 1125F AMNT PAIN NOTED PAIN PRSNT: CPT | Performed by: STUDENT IN AN ORGANIZED HEALTH CARE EDUCATION/TRAINING PROGRAM

## 2025-06-13 RX ORDER — AMLODIPINE BESYLATE 10 MG/1
10 TABLET ORAL DAILY
Qty: 90 TABLET | Refills: 1 | Status: SHIPPED | OUTPATIENT
Start: 2025-06-13

## 2025-06-13 RX ORDER — FLUOXETINE 10 MG/1
10 CAPSULE ORAL DAILY
Qty: 90 CAPSULE | Refills: 3 | Status: SHIPPED | OUTPATIENT
Start: 2025-06-13

## 2025-06-13 RX ORDER — TRAZODONE HYDROCHLORIDE 100 MG/1
100 TABLET ORAL
Qty: 90 TABLET | Refills: 3 | Status: SHIPPED | OUTPATIENT
Start: 2025-06-13

## 2025-06-13 RX ORDER — ALBUTEROL SULFATE 90 UG/1
2 INHALANT RESPIRATORY (INHALATION) EVERY 4 HOURS PRN
Qty: 18 G | Refills: 5 | Status: SHIPPED | OUTPATIENT
Start: 2025-06-13

## 2025-06-13 NOTE — PROGRESS NOTES
Office Note     Name: Wilmer Stover    : 1975     MRN: [id number]     Chief Complaint  Hypertension (Stated this is the second time his job has sent him home due to elevated bp ) and Dizziness    Subjective     History of Present Illness:  Wilmer Stover is a 50 y.o. male who presents today for     Having high blood pressure, reports systolic readings at home > 140.  Having some dizziness when he notes he hs high BP  Denies syncope no CP, no falls, no vision changes or blurriness     He needs refills for his depression and sleeping medicine.     Review of Systems:   Review of Systems   All other systems reviewed and are negative.      Past Medical History:   Past Medical History:   Diagnosis Date    Allergic     Anxiety     Arthritis     Asthma     COPD (chronic obstructive pulmonary disease)     Depression     Gall stones     Hepatitis C     Hodgkin's lymphoma     Hyperlipidemia     Hypertension     Kidney stone     Memory loss     Neuropathy     Numbness and tingling     Thyroid disease     Weakness        Past Surgical History:   Past Surgical History:   Procedure Laterality Date    CHOLECYSTECTOMY      CYSTOSCOPY BLADDER STONE LITHOTRIPSY      MASTECTOMY MODIFIED RADICAL W/ AXILLARY LYMPH NODES W/ OR W/O PECTORALIS MINOR         Family History:   Family History   Problem Relation Age of Onset    Hypertension Mother     Hyperlipidemia Mother     Alcohol abuse Mother     Arthritis Mother     Alcohol abuse Father     Heart disease Maternal Grandmother        Social History:   Social History     Socioeconomic History    Marital status:    Tobacco Use    Smoking status: Some Days     Current packs/day: 0.50     Types: Cigarettes    Smokeless tobacco: Never   Vaping Use    Vaping status: Never Used   Substance and Sexual Activity    Alcohol use: No    Drug use: Not Currently     Comment: sobriety date 2022    Sexual activity: Not Currently       Immunizations:   Immunization  History   Administered Date(s) Administered    COVID-19 (MODERNA) 1st,2nd,3rd Dose Monovalent 07/22/2021, 08/25/2021    Pneumococcal Polysaccharide (PPSV23) 05/06/2015    Tdap 11/23/2017, 07/05/2019        Medications:     Current Outpatient Medications:     albuterol sulfate  (90 Base) MCG/ACT inhaler, Inhale 2 puffs Every 4 (Four) Hours As Needed for Wheezing or Shortness of Air., Disp: 18 g, Rfl: 5    FLUoxetine (PROzac) 10 MG capsule, Take 1 capsule by mouth Daily., Disp: 90 capsule, Rfl: 3    fluticasone (FLONASE) 50 MCG/ACT nasal spray, 2 sprays into the nostril(s) as directed by provider Daily., Disp: 15.8 mL, Rfl: 0    PrilOSEC OTC 20 MG EC tablet, Take 1 tablet by mouth Every Morning., Disp: , Rfl:     rosuvastatin (Crestor) 10 MG tablet, Take 1 tablet by mouth Daily., Disp: 90 tablet, Rfl: 3    sodium-potassium-magnesium sulfates (Suprep Bowel Prep Kit) 17.5-3.13-1.6 GM/177ML solution oral solution, Take 1 bottle by mouth Take As Directed., Disp: 354 mL, Rfl: 0    traZODone (DESYREL) 100 MG tablet, Take 1 tablet by mouth every night at bedtime., Disp: 90 tablet, Rfl: 3    triamcinolone (KENALOG) 0.1 % ointment, Apply 1 Application topically to the appropriate area as directed 2 (Two) Times a Day., Disp: 80 g, Rfl: 2    amLODIPine (NORVASC) 10 MG tablet, Take 1 tablet by mouth Daily., Disp: 90 tablet, Rfl: 1    cetirizine (zyrTEC) 10 MG tablet, Take 1 tablet by mouth Daily. For allergies (Patient not taking: Reported on 6/13/2025), Disp: 30 tablet, Rfl: 5    Diclofenac Sodium (VOLTAREN) 1 % gel gel, Apply 4 g topically to the appropriate area as directed 4 (Four) Times a Day As Needed (joint pain). (Patient not taking: Reported on 6/13/2025), Disp: 350 g, Rfl: 0    meloxicam (MOBIC) 7.5 MG tablet, TAKE 1 TABLET BY MOUTH DAILY (Patient not taking: Reported on 6/13/2025), Disp: 90 tablet, Rfl: 0    rOPINIRole (REQUIP) 0.5 MG tablet, Take 1 tablet by mouth Every Night. Take 1 hour before bedtime.  "(Patient not taking: Reported on 6/13/2025), Disp: 90 tablet, Rfl: 1    tamsulosin (FLOMAX) 0.4 MG capsule 24 hr capsule, Take 1 capsule by mouth every night at bedtime. (Patient not taking: Reported on 6/13/2025), Disp: 90 capsule, Rfl: 1    Allergies:   Allergies   Allergen Reactions    Penicillins Hives and Unknown (See Comments)       Objective     Vital Signs  /80   Pulse 88   Temp 97.5 °F (36.4 °C) (Temporal)   Ht 166 cm (65.35\")   Wt 86.5 kg (190 lb 9.6 oz)   SpO2 98%   BMI 31.37 kg/m²   Estimated body mass index is 31.37 kg/m² as calculated from the following:    Height as of this encounter: 166 cm (65.35\").    Weight as of this encounter: 86.5 kg (190 lb 9.6 oz).          Physical Exam  Constitutional:       Appearance: Normal appearance.   Cardiovascular:      Rate and Rhythm: Normal rate and regular rhythm.      Pulses: Normal pulses.      Heart sounds: Normal heart sounds.   Pulmonary:      Effort: Pulmonary effort is normal.      Breath sounds: Normal breath sounds.   Neurological:      Mental Status: He is alert.          Result Review :                  Assessment and Plan     1. Hypertension, unspecified type  Elevated readings in office.  Start amlodipine 10 mg daily  Continue home blood pressure readings  - amLODIPine (NORVASC) 10 MG tablet; Take 1 tablet by mouth Daily.  Dispense: 90 tablet; Refill: 1    2. Seasonal allergies  Medication refilled - albuterol sulfate  (90 Base) MCG/ACT inhaler; Inhale 2 puffs Every 4 (Four) Hours As Needed for Wheezing or Shortness of Air.  Dispense: 18 g; Refill: 5    3. Depressed mood  Mood stable  Medication refilled  - FLUoxetine (PROzac) 10 MG capsule; Take 1 capsule by mouth Daily.  Dispense: 90 capsule; Refill: 3    4. Difficulty sleeping  Symptoms controlled on trazodone nightly  - traZODone (DESYREL) 100 MG tablet; Take 1 tablet by mouth every night at bedtime.  Dispense: 90 tablet; Refill: 3       Follow Up  Return in about 4 weeks " (around 7/11/2025), or Hypertension check, for Recheck.    Mata Maher MD  [facility] [address]  [facility]  [address]  [address] [address] [phone]

## 2025-07-11 ENCOUNTER — HOSPITAL ENCOUNTER (INPATIENT)
Facility: HOSPITAL | Age: 50
LOS: 2 days | Discharge: HOME | DRG: 322 | End: 2025-07-13
Attending: EMERGENCY MEDICINE | Admitting: STUDENT IN AN ORGANIZED HEALTH CARE EDUCATION/TRAINING PROGRAM

## 2025-07-11 ENCOUNTER — APPOINTMENT (OUTPATIENT)
Dept: GENERAL RADIOLOGY | Facility: HOSPITAL | Age: 50
DRG: 322 | End: 2025-07-11

## 2025-07-11 DIAGNOSIS — Z87.09 HISTORY OF COPD: ICD-10-CM

## 2025-07-11 DIAGNOSIS — I21.4 NON-STEMI (NON-ST ELEVATED MYOCARDIAL INFARCTION): ICD-10-CM

## 2025-07-11 DIAGNOSIS — Z86.39 HISTORY OF HYPERLIPIDEMIA: ICD-10-CM

## 2025-07-11 DIAGNOSIS — Z86.19 HISTORY OF HEPATITIS C: ICD-10-CM

## 2025-07-11 DIAGNOSIS — Z72.0 TOBACCO ABUSE: ICD-10-CM

## 2025-07-11 DIAGNOSIS — I21.4 NSTEMI (NON-ST ELEVATED MYOCARDIAL INFARCTION): Primary | ICD-10-CM

## 2025-07-11 DIAGNOSIS — R11.0 NAUSEA: ICD-10-CM

## 2025-07-11 DIAGNOSIS — I35.1 NONRHEUMATIC AORTIC VALVE INSUFFICIENCY: ICD-10-CM

## 2025-07-11 DIAGNOSIS — Z86.79 HISTORY OF HYPERTENSION: ICD-10-CM

## 2025-07-11 DIAGNOSIS — R06.02 SHORTNESS OF BREATH: ICD-10-CM

## 2025-07-11 DIAGNOSIS — I25.119 CORONARY ARTERY DISEASE INVOLVING NATIVE CORONARY ARTERY OF NATIVE HEART WITH ANGINA PECTORIS: ICD-10-CM

## 2025-07-11 DIAGNOSIS — I31.39 PERICARDIAL EFFUSION: ICD-10-CM

## 2025-07-11 DIAGNOSIS — I30.0 ACUTE IDIOPATHIC PERICARDITIS: ICD-10-CM

## 2025-07-11 DIAGNOSIS — R07.89 ATYPICAL CHEST PAIN: ICD-10-CM

## 2025-07-11 DIAGNOSIS — E78.5 HYPERLIPIDEMIA LDL GOAL <50: ICD-10-CM

## 2025-07-11 LAB
ALBUMIN SERPL-MCNC: 4.3 G/DL (ref 3.5–5.2)
ALBUMIN/GLOB SERPL: 1.2 G/DL
ALP SERPL-CCNC: 100 U/L (ref 39–117)
ALT SERPL W P-5'-P-CCNC: 16 U/L (ref 1–41)
ANION GAP SERPL CALCULATED.3IONS-SCNC: 13.3 MMOL/L (ref 5–15)
APTT PPP: 32.2 SECONDS (ref 60–90)
AST SERPL-CCNC: 23 U/L (ref 1–40)
B PARAPERT DNA SPEC QL NAA+PROBE: NOT DETECTED
B PERT DNA SPEC QL NAA+PROBE: NOT DETECTED
BASOPHILS # BLD AUTO: 0.08 10*3/MM3 (ref 0–0.2)
BASOPHILS NFR BLD AUTO: 0.6 % (ref 0–1.5)
BILIRUB SERPL-MCNC: 0.8 MG/DL (ref 0–1.2)
BUN SERPL-MCNC: 8.8 MG/DL (ref 6–20)
BUN/CREAT SERPL: 11.3 (ref 7–25)
C PNEUM DNA NPH QL NAA+NON-PROBE: NOT DETECTED
CALCIUM SPEC-SCNC: 9.5 MG/DL (ref 8.6–10.5)
CHLORIDE SERPL-SCNC: 102 MMOL/L (ref 98–107)
CLUMPED PLATELETS: PRESENT
CO2 SERPL-SCNC: 20.7 MMOL/L (ref 22–29)
CREAT SERPL-MCNC: 0.78 MG/DL (ref 0.76–1.27)
D-LACTATE SERPL-SCNC: 0.9 MMOL/L (ref 0.5–2)
DEPRECATED RDW RBC AUTO: 37.8 FL (ref 37–54)
EGFRCR SERPLBLD CKD-EPI 2021: 108.6 ML/MIN/1.73
EOSINOPHIL # BLD AUTO: 0.42 10*3/MM3 (ref 0–0.4)
EOSINOPHIL NFR BLD AUTO: 3.2 % (ref 0.3–6.2)
ERYTHROCYTE [DISTWIDTH] IN BLOOD BY AUTOMATED COUNT: 11.9 % (ref 12.3–15.4)
FLUAV SUBTYP SPEC NAA+PROBE: NOT DETECTED
FLUBV RNA NPH QL NAA+NON-PROBE: NOT DETECTED
GEN 5 1HR TROPONIN T REFLEX: 93 NG/L
GLOBULIN UR ELPH-MCNC: 3.5 GM/DL
GLUCOSE SERPL-MCNC: 132 MG/DL (ref 65–99)
HADV DNA SPEC NAA+PROBE: NOT DETECTED
HCOV 229E RNA SPEC QL NAA+PROBE: NOT DETECTED
HCOV HKU1 RNA SPEC QL NAA+PROBE: NOT DETECTED
HCOV NL63 RNA SPEC QL NAA+PROBE: NOT DETECTED
HCOV OC43 RNA SPEC QL NAA+PROBE: NOT DETECTED
HCT VFR BLD AUTO: 45.9 % (ref 37.5–51)
HGB BLD-MCNC: 15.6 G/DL (ref 13–17.7)
HMPV RNA NPH QL NAA+NON-PROBE: NOT DETECTED
HOLD SPECIMEN: NORMAL
HPIV1 RNA ISLT QL NAA+PROBE: NOT DETECTED
HPIV2 RNA SPEC QL NAA+PROBE: NOT DETECTED
HPIV3 RNA NPH QL NAA+PROBE: NOT DETECTED
HPIV4 P GENE NPH QL NAA+PROBE: NOT DETECTED
IMM GRANULOCYTES # BLD AUTO: 0.04 10*3/MM3 (ref 0–0.05)
IMM GRANULOCYTES NFR BLD AUTO: 0.3 % (ref 0–0.5)
INR PPP: 0.98 (ref 0.89–1.12)
LIPASE SERPL-CCNC: 25 U/L (ref 13–60)
LYMPHOCYTES # BLD AUTO: 2.07 10*3/MM3 (ref 0.7–3.1)
LYMPHOCYTES NFR BLD AUTO: 15.6 % (ref 19.6–45.3)
M PNEUMO IGG SER IA-ACNC: NOT DETECTED
MCH RBC QN AUTO: 29.4 PG (ref 26.6–33)
MCHC RBC AUTO-ENTMCNC: 34 G/DL (ref 31.5–35.7)
MCV RBC AUTO: 86.4 FL (ref 79–97)
MONOCYTES # BLD AUTO: 1.48 10*3/MM3 (ref 0.1–0.9)
MONOCYTES NFR BLD AUTO: 11.2 % (ref 5–12)
NEUTROPHILS NFR BLD AUTO: 69.1 % (ref 42.7–76)
NEUTROPHILS NFR BLD AUTO: 9.18 10*3/MM3 (ref 1.7–7)
NRBC BLD AUTO-RTO: 0 /100 WBC (ref 0–0.2)
NT-PROBNP SERPL-MCNC: 158 PG/ML (ref 0–900)
PLATELET # BLD AUTO: 239 10*3/MM3 (ref 140–450)
PMV BLD AUTO: 10.8 FL (ref 6–12)
POTASSIUM SERPL-SCNC: 3.7 MMOL/L (ref 3.5–5.2)
PROCALCITONIN SERPL-MCNC: 0.05 NG/ML (ref 0–0.25)
PROT SERPL-MCNC: 7.8 G/DL (ref 6–8.5)
PROTHROMBIN TIME: 13.5 SECONDS (ref 12.2–15.3)
QT INTERVAL: 322 MS
QT INTERVAL: 334 MS
QTC INTERVAL: 424 MS
QTC INTERVAL: 443 MS
RBC # BLD AUTO: 5.31 10*6/MM3 (ref 4.14–5.8)
RBC MORPH BLD: NORMAL
RHINOVIRUS RNA SPEC NAA+PROBE: NOT DETECTED
RSV RNA NPH QL NAA+NON-PROBE: NOT DETECTED
SARS-COV-2 RNA RESP QL NAA+PROBE: NOT DETECTED
SMALL PLATELETS BLD QL SMEAR: ADEQUATE
SODIUM SERPL-SCNC: 136 MMOL/L (ref 136–145)
TROPONIN T % DELTA: -8
TROPONIN T NUMERIC DELTA: -8 NG/L
TROPONIN T SERPL HS-MCNC: 101 NG/L
UFH PPP CHRO-ACNC: 0.1 IU/ML (ref 0.3–0.7)
WBC MORPH BLD: NORMAL
WBC NRBC COR # BLD AUTO: 13.27 10*3/MM3 (ref 3.4–10.8)
WHOLE BLOOD HOLD COAG: NORMAL
WHOLE BLOOD HOLD SPECIMEN: NORMAL

## 2025-07-11 PROCEDURE — 83605 ASSAY OF LACTIC ACID: CPT | Performed by: PHYSICIAN ASSISTANT

## 2025-07-11 PROCEDURE — 83880 ASSAY OF NATRIURETIC PEPTIDE: CPT | Performed by: EMERGENCY MEDICINE

## 2025-07-11 PROCEDURE — 25810000003 SODIUM CHLORIDE 0.9 % SOLUTION: Performed by: PHYSICIAN ASSISTANT

## 2025-07-11 PROCEDURE — 80053 COMPREHEN METABOLIC PANEL: CPT | Performed by: EMERGENCY MEDICINE

## 2025-07-11 PROCEDURE — 85007 BL SMEAR W/DIFF WBC COUNT: CPT | Performed by: EMERGENCY MEDICINE

## 2025-07-11 PROCEDURE — 85610 PROTHROMBIN TIME: CPT | Performed by: PHYSICIAN ASSISTANT

## 2025-07-11 PROCEDURE — 99285 EMERGENCY DEPT VISIT HI MDM: CPT

## 2025-07-11 PROCEDURE — 93005 ELECTROCARDIOGRAM TRACING: CPT | Performed by: EMERGENCY MEDICINE

## 2025-07-11 PROCEDURE — 85520 HEPARIN ASSAY: CPT | Performed by: PHYSICIAN ASSISTANT

## 2025-07-11 PROCEDURE — 83690 ASSAY OF LIPASE: CPT | Performed by: EMERGENCY MEDICINE

## 2025-07-11 PROCEDURE — 84145 PROCALCITONIN (PCT): CPT | Performed by: PHYSICIAN ASSISTANT

## 2025-07-11 PROCEDURE — 93005 ELECTROCARDIOGRAM TRACING: CPT | Performed by: STUDENT IN AN ORGANIZED HEALTH CARE EDUCATION/TRAINING PROGRAM

## 2025-07-11 PROCEDURE — 85730 THROMBOPLASTIN TIME PARTIAL: CPT | Performed by: PHYSICIAN ASSISTANT

## 2025-07-11 PROCEDURE — 36415 COLL VENOUS BLD VENIPUNCTURE: CPT

## 2025-07-11 PROCEDURE — 0202U NFCT DS 22 TRGT SARS-COV-2: CPT | Performed by: PHYSICIAN ASSISTANT

## 2025-07-11 PROCEDURE — 25010000002 MORPHINE PER 10 MG: Performed by: EMERGENCY MEDICINE

## 2025-07-11 PROCEDURE — 71045 X-RAY EXAM CHEST 1 VIEW: CPT

## 2025-07-11 PROCEDURE — 85025 COMPLETE CBC W/AUTO DIFF WBC: CPT | Performed by: EMERGENCY MEDICINE

## 2025-07-11 PROCEDURE — 84484 ASSAY OF TROPONIN QUANT: CPT | Performed by: EMERGENCY MEDICINE

## 2025-07-11 PROCEDURE — 99222 1ST HOSP IP/OBS MODERATE 55: CPT | Performed by: STUDENT IN AN ORGANIZED HEALTH CARE EDUCATION/TRAINING PROGRAM

## 2025-07-11 PROCEDURE — 25010000002 ONDANSETRON PER 1 MG: Performed by: EMERGENCY MEDICINE

## 2025-07-11 PROCEDURE — 25010000002 HEPARIN (PORCINE) 25000-0.45 UT/250ML-% SOLUTION: Performed by: PHYSICIAN ASSISTANT

## 2025-07-11 PROCEDURE — 25010000002 HEPARIN (PORCINE) PER 1000 UNITS: Performed by: PHYSICIAN ASSISTANT

## 2025-07-11 RX ORDER — SODIUM CHLORIDE 0.9 % (FLUSH) 0.9 %
10 SYRINGE (ML) INJECTION AS NEEDED
Status: DISCONTINUED | OUTPATIENT
Start: 2025-07-11 | End: 2025-07-13 | Stop reason: HOSPADM

## 2025-07-11 RX ORDER — AMLODIPINE BESYLATE 10 MG/1
10 TABLET ORAL DAILY
Status: DISCONTINUED | OUTPATIENT
Start: 2025-07-12 | End: 2025-07-13 | Stop reason: HOSPADM

## 2025-07-11 RX ORDER — BISACODYL 10 MG
10 SUPPOSITORY, RECTAL RECTAL DAILY PRN
Status: DISCONTINUED | OUTPATIENT
Start: 2025-07-11 | End: 2025-07-13 | Stop reason: HOSPADM

## 2025-07-11 RX ORDER — HEPARIN SODIUM 1000 [USP'U]/ML
2000 INJECTION, SOLUTION INTRAVENOUS; SUBCUTANEOUS AS NEEDED
Status: DISCONTINUED | OUTPATIENT
Start: 2025-07-11 | End: 2025-07-11

## 2025-07-11 RX ORDER — HEPARIN SODIUM 10000 [USP'U]/100ML
15 INJECTION, SOLUTION INTRAVENOUS
Status: DISCONTINUED | OUTPATIENT
Start: 2025-07-11 | End: 2025-07-12

## 2025-07-11 RX ORDER — NITROGLYCERIN 0.4 MG/1
0.4 TABLET SUBLINGUAL
Status: DISCONTINUED | OUTPATIENT
Start: 2025-07-11 | End: 2025-07-13 | Stop reason: HOSPADM

## 2025-07-11 RX ORDER — MORPHINE SULFATE 2 MG/ML
2 INJECTION, SOLUTION INTRAMUSCULAR; INTRAVENOUS ONCE
Status: COMPLETED | OUTPATIENT
Start: 2025-07-11 | End: 2025-07-11

## 2025-07-11 RX ORDER — ASPIRIN 81 MG/1
324 TABLET, CHEWABLE ORAL ONCE
Status: COMPLETED | OUTPATIENT
Start: 2025-07-11 | End: 2025-07-11

## 2025-07-11 RX ORDER — SODIUM CHLORIDE 0.9 % (FLUSH) 0.9 %
10 SYRINGE (ML) INJECTION EVERY 12 HOURS SCHEDULED
Status: DISCONTINUED | OUTPATIENT
Start: 2025-07-11 | End: 2025-07-13 | Stop reason: HOSPADM

## 2025-07-11 RX ORDER — AMOXICILLIN 250 MG
2 CAPSULE ORAL 2 TIMES DAILY PRN
Status: DISCONTINUED | OUTPATIENT
Start: 2025-07-11 | End: 2025-07-13 | Stop reason: HOSPADM

## 2025-07-11 RX ORDER — FLUOXETINE 10 MG/1
10 CAPSULE ORAL DAILY
Status: DISCONTINUED | OUTPATIENT
Start: 2025-07-12 | End: 2025-07-13 | Stop reason: HOSPADM

## 2025-07-11 RX ORDER — SODIUM CHLORIDE 9 MG/ML
40 INJECTION, SOLUTION INTRAVENOUS AS NEEDED
Status: DISCONTINUED | OUTPATIENT
Start: 2025-07-11 | End: 2025-07-13 | Stop reason: HOSPADM

## 2025-07-11 RX ORDER — ONDANSETRON 2 MG/ML
4 INJECTION INTRAMUSCULAR; INTRAVENOUS ONCE
Status: COMPLETED | OUTPATIENT
Start: 2025-07-11 | End: 2025-07-11

## 2025-07-11 RX ORDER — HYDRALAZINE HYDROCHLORIDE 20 MG/ML
10 INJECTION INTRAMUSCULAR; INTRAVENOUS EVERY 6 HOURS PRN
Status: DISCONTINUED | OUTPATIENT
Start: 2025-07-11 | End: 2025-07-13 | Stop reason: HOSPADM

## 2025-07-11 RX ORDER — NITROGLYCERIN 0.4 MG/1
0.4 TABLET SUBLINGUAL
Status: DISCONTINUED | OUTPATIENT
Start: 2025-07-11 | End: 2025-07-11

## 2025-07-11 RX ORDER — HEPARIN SODIUM 1000 [USP'U]/ML
4000 INJECTION, SOLUTION INTRAVENOUS; SUBCUTANEOUS AS NEEDED
Status: DISCONTINUED | OUTPATIENT
Start: 2025-07-11 | End: 2025-07-11

## 2025-07-11 RX ORDER — ASPIRIN 81 MG/1
81 TABLET, CHEWABLE ORAL DAILY
Status: DISCONTINUED | OUTPATIENT
Start: 2025-07-12 | End: 2025-07-13 | Stop reason: HOSPADM

## 2025-07-11 RX ORDER — BISACODYL 5 MG/1
5 TABLET, DELAYED RELEASE ORAL DAILY PRN
Status: DISCONTINUED | OUTPATIENT
Start: 2025-07-11 | End: 2025-07-13 | Stop reason: HOSPADM

## 2025-07-11 RX ORDER — ROSUVASTATIN CALCIUM 10 MG/1
10 TABLET, COATED ORAL DAILY
Status: DISCONTINUED | OUTPATIENT
Start: 2025-07-12 | End: 2025-07-12

## 2025-07-11 RX ORDER — POLYETHYLENE GLYCOL 3350 17 G/17G
17 POWDER, FOR SOLUTION ORAL DAILY PRN
Status: DISCONTINUED | OUTPATIENT
Start: 2025-07-11 | End: 2025-07-13 | Stop reason: HOSPADM

## 2025-07-11 RX ORDER — HEPARIN SODIUM 1000 [USP'U]/ML
4000 INJECTION, SOLUTION INTRAVENOUS; SUBCUTANEOUS ONCE
Status: COMPLETED | OUTPATIENT
Start: 2025-07-11 | End: 2025-07-11

## 2025-07-11 RX ORDER — CARVEDILOL 3.12 MG/1
3.12 TABLET ORAL 2 TIMES DAILY WITH MEALS
Status: DISCONTINUED | OUTPATIENT
Start: 2025-07-11 | End: 2025-07-13 | Stop reason: HOSPADM

## 2025-07-11 RX ORDER — NITROGLYCERIN 0.4 MG/1
0.4 TABLET SUBLINGUAL
Status: DISCONTINUED | OUTPATIENT
Start: 2025-07-11 | End: 2025-07-11 | Stop reason: SDUPTHER

## 2025-07-11 RX ADMIN — NITROGLYCERIN 0.4 MG: 0.4 TABLET SUBLINGUAL at 21:40

## 2025-07-11 RX ADMIN — ONDANSETRON 4 MG: 2 INJECTION, SOLUTION INTRAMUSCULAR; INTRAVENOUS at 21:25

## 2025-07-11 RX ADMIN — HEPARIN SODIUM 12 UNITS/KG/HR: 10000 INJECTION, SOLUTION INTRAVENOUS at 21:32

## 2025-07-11 RX ADMIN — HEPARIN SODIUM 4000 UNITS: 1000 INJECTION, SOLUTION INTRAVENOUS; SUBCUTANEOUS at 21:32

## 2025-07-11 RX ADMIN — ASPIRIN 243 MG: 81 TABLET, CHEWABLE ORAL at 19:44

## 2025-07-11 RX ADMIN — MORPHINE SULFATE 2 MG: 2 INJECTION, SOLUTION INTRAMUSCULAR; INTRAVENOUS at 21:25

## 2025-07-11 RX ADMIN — NITROGLYCERIN 0.4 MG: 0.4 TABLET SUBLINGUAL at 21:35

## 2025-07-11 RX ADMIN — NITROGLYCERIN 0.4 MG: 0.4 TABLET SUBLINGUAL at 21:46

## 2025-07-11 RX ADMIN — SODIUM CHLORIDE 1000 ML: 9 INJECTION, SOLUTION INTRAVENOUS at 21:25

## 2025-07-11 NOTE — Clinical Note
Hemostasis started on the right ulnar artery. R-Band was used in achieving hemostasis. Radial compression device applied to vessel. Hemostasis achieved successfully. Closure device additional comment: 12 cc air

## 2025-07-11 NOTE — ED PROVIDER NOTES
Subjective   History of Present Illness  This is a 50-year-old male that presents the ER with 3-day history of waxing and waning bilateral chest pain described as pressure with intermittent sharp pains that radiates to bilateral aspects of neck.  Patient also describes associated shortness of breath and nausea.  He denies any recent rhinorrhea, nasal congestion, or cough.  He says that symptoms have been worsened with exertion and activity.  Initially, rest made the symptoms better, but today he had the symptoms at rest, and he decided to come to the ER for further assessment.  Past medical history is significant for hypertension, hyperlipidemia, GERD, kidney stones, Hodgkin's lymphoma,tobacco abuse, COPD, anxiety/depression.  Patient tells me that his last tress test was approximately 2 years ago and he says it was done through [facility]. I do not see that result in epic.  Patient denies any increase symptoms of acid indigestion.  He denies pedal edema to lower extremities.  No other concerns at this time.    History provided by:  Patient and medical records  Chest Pain  Pain location:  L chest and R chest  Pain quality: pressure and sharp    Pain radiates to:  Neck (Bilateral aspects of neck)  Pain severity:  Moderate  Duration:  3 days  Timing:  Intermittent  Progression:  Worsening  Chronicity:  New  Context comment:  3-day history of waxing and waning bilateral chest pain with intermittent shortness of breath and nausea.  Chest wall is also tender to touch.  Positive smoker and recently diagnosed with hypertension  Relieved by:  Nothing  Worsened by:  Exertion (Palpation to chest wall)  Ineffective treatments:  Aspirin (Aspirin 81 mg po earlier today and Ibuprofen)  Associated symptoms: anorexia, nausea and shortness of breath    Associated symptoms: no abdominal pain, no altered mental status, no anxiety, no back pain, no cough, no diaphoresis, no dizziness, no fatigue, no fever, no headache, no  heartburn, no lower extremity edema, no near-syncope, no numbness, no orthopnea, no palpitations, no PND, no syncope, no vomiting and no weakness    Risk factors: high cholesterol, hypertension, male sex and smoking (Pt smokes 1/2ppd.)    Risk factors: no coronary artery disease    Risk factors comment:  Patient tells me that he was diagnosed with hypertension at recent office visit with Dr. Maher 6/13/25 and Rx for Amlodipine 10 mg po daily. Pt also tells me that he had a normal LHC approximately 2 years ago.      Review of Systems   Constitutional:  Positive for appetite change. Negative for diaphoresis, fatigue and fever.   HENT: Negative.  Negative for congestion, ear pain, postnasal drip, rhinorrhea, sinus pressure, sinus pain, sneezing and sore throat.    Respiratory:  Positive for chest tightness and shortness of breath. Negative for cough and wheezing.         Patient smokes 6/2 pack of cigarettes per day.  No personal history of asthma or COPD.  No recent cough or chest congestion   Cardiovascular:  Positive for chest pain (Right chest pain that radiates to the left chest and bilateral aspects of neck.  Described as pressure with intermittent sharp pains). Negative for palpitations, orthopnea, syncope, PND and near-syncope.   Gastrointestinal:  Positive for anorexia and nausea. Negative for abdominal pain, constipation, diarrhea, heartburn and vomiting.   Genitourinary: Negative.    Musculoskeletal:  Positive for myalgias (Chest wall tenderness to palpation). Negative for back pain.   Skin: Negative.  Negative for color change, rash and wound.   Neurological: Negative.  Negative for dizziness, weakness, numbness and headaches.   All other systems reviewed and are negative.      Past Medical History:   Diagnosis Date    Allergic     Anxiety     Arthritis     Asthma     COPD (chronic obstructive pulmonary disease)     Depression     Gall stones     Hepatitis C     Hodgkin's lymphoma     Hyperlipidemia      Hypertension     Kidney stone     Memory loss     Neuropathy     Numbness and tingling     Thyroid disease     Weakness        Allergies   Allergen Reactions    Penicillins Hives and Unknown (See Comments)       Past Surgical History:   Procedure Laterality Date    CHOLECYSTECTOMY      CYSTOSCOPY BLADDER STONE LITHOTRIPSY      MASTECTOMY MODIFIED RADICAL W/ AXILLARY LYMPH NODES W/ OR W/O PECTORALIS MINOR         Family History   Problem Relation Age of Onset    Hypertension Mother     Hyperlipidemia Mother     Alcohol abuse Mother     Arthritis Mother     Alcohol abuse Father     Heart disease Maternal Grandmother        Social History     Socioeconomic History    Marital status:    Tobacco Use    Smoking status: Some Days     Current packs/day: 0.50     Types: Cigarettes    Smokeless tobacco: Never   Vaping Use    Vaping status: Never Used   Substance and Sexual Activity    Alcohol use: No    Drug use: Not Currently     Comment: sobriety date 12/22/2022    Sexual activity: Not Currently           Objective   Physical Exam  Vitals and nursing note reviewed.   Constitutional:       General: He is not in acute distress.     Appearance: Normal appearance. He is not ill-appearing, toxic-appearing or diaphoretic.      Comments: No acute sign of pain or distress. Non-toxic.   HENT:      Head: Normocephalic and atraumatic.      Nose: Nose normal.      Mouth/Throat:      Mouth: Mucous membranes are moist.      Pharynx: Oropharynx is clear.      Comments: Oral mucous membranes are moist.  Eyes:      Extraocular Movements: Extraocular movements intact.      Conjunctiva/sclera: Conjunctivae normal.      Pupils: Pupils are equal, round, and reactive to light.   Cardiovascular:      Rate and Rhythm: Regular rhythm. Tachycardia present. Extrasystoles are present.     Pulses: Normal pulses.      Heart sounds: Normal heart sounds. No murmur heard.     Comments: Tachycardia with occasional ectopy. No pedal edema to  BLE.  Pulmonary:      Effort: Pulmonary effort is normal. No tachypnea or accessory muscle usage.      Breath sounds: Normal breath sounds. No decreased breath sounds, wheezing or rhonchi.      Comments: Regular respiratory effort. Lungs are clear to auscultation bilaterally. No decreased breath sounds concerning to consolidation.  Chest:      Chest wall: Tenderness present. No deformity, swelling, crepitus or edema.      Comments: Bilateral chest wall tenderness to palpation. No bruising or sign of injury. No skin lesions or rash. No sign of injury.  Abdominal:      General: Bowel sounds are normal.      Palpations: Abdomen is soft.   Musculoskeletal:         General: Normal range of motion.      Cervical back: Normal range of motion and neck supple.      Right lower leg: No edema.      Left lower leg: No edema.      Comments: Chest wall tenderness to palpation. No bruising to chest wall or sign of injury.   Skin:     General: Skin is warm and dry.      Findings: No abrasion, bruising, ecchymosis, signs of injury, lesion or rash.   Neurological:      General: No focal deficit present.      Mental Status: He is alert and oriented to person, place, and time.      Cranial Nerves: Cranial nerves 2-12 are intact.      Sensory: Sensation is intact.      Motor: Motor function is intact.      Coordination: Coordination is intact.      Gait: Gait is intact.      Comments: Neuro in-tact and non-focal. Normal gait.   Psychiatric:         Mood and Affect: Affect normal. Mood is anxious.         Speech: Speech normal.         Behavior: Behavior normal. Behavior is cooperative.         Thought Content: Thought content normal.         Cognition and Memory: Cognition and memory normal.         Judgment: Judgment normal.      Comments: Anxious mood. Normal affect.         Procedures           ED Course  ED Course as of 07/11/25 2339   Fri Jul 11, 2025 2114 I personally interpreted EKGs and reviewed them with Dr. Solano, ER  attending physician.  There were some findings of repolarization, but this was present on previous EKG from 6/12/2023.  Initial troponin was 101 and repeat troponin was 93.  CBC showed white blood cell count of 13,000 with normal differential.  Chemistries were within normal limits.  Lactic acid was 0.9.  Respiratory PCR panel was completely negative.  BNP was 158.  I personally interpreted chest x-ray which revealed no acute cardiopulmonary process.  Pt still rates CP 7/10. I will order NTG 0.4 mg SL x 3 q 5 minutes,  mg po, Morphine 2 mg IV, and we will initiate heparin. [FC]   2335 Upon reevaluation, patient is resting comfortably.  Discussed admission with Dr. Andrea, hospitalist, for cardiac rule out per MI protocol.  Hospitalist team is agreeable to admission.  Vital signs are stable.  Patient is agreeable with admission plan. [FC]   2339 Heart score is 7 [FC]      ED Course User Index  [FC] Leda Win, PAPA                  HEART Score: 7   Shared Decision Making  I discussed the findings with the patient/patient representative who is in agreement with the treatment plan and the final disposition.  Risks and benefits of discharge and/or observation/admission were discussed: Yes                          Recent Results (from the past 24 hours)   ECG 12 Lead Chest Pain    Collection Time: 07/11/25  7:31 PM   Result Value Ref Range    QT Interval 322 ms    QTC Interval 443 ms   Green Top (Gel)    Collection Time: 07/11/25  7:42 PM   Result Value Ref Range    Extra Tube Hold for add-ons.    Lavender Top    Collection Time: 07/11/25  7:42 PM   Result Value Ref Range    Extra Tube hold for add-on    Gold Top - SST    Collection Time: 07/11/25  7:42 PM   Result Value Ref Range    Extra Tube Hold for add-ons.    Gray Top    Collection Time: 07/11/25  7:42 PM   Result Value Ref Range    Extra Tube Hold for add-ons.    CBC Auto Differential    Collection Time: 07/11/25  7:42 PM    Specimen: Blood   Result  Value Ref Range    WBC 13.27 (H) 3.40 - 10.80 10*3/mm3    RBC 5.31 4.14 - 5.80 10*6/mm3    Hemoglobin 15.6 13.0 - 17.7 g/dL    Hematocrit 45.9 37.5 - 51.0 %    MCV 86.4 79.0 - 97.0 fL    MCH 29.4 26.6 - 33.0 pg    MCHC 34.0 31.5 - 35.7 g/dL    RDW 11.9 (L) 12.3 - 15.4 %    RDW-SD 37.8 37.0 - 54.0 fl    MPV 10.8 6.0 - 12.0 fL    Platelets 239 140 - 450 10*3/mm3    Neutrophil % 69.1 42.7 - 76.0 %    Lymphocyte % 15.6 (L) 19.6 - 45.3 %    Monocyte % 11.2 5.0 - 12.0 %    Eosinophil % 3.2 0.3 - 6.2 %    Basophil % 0.6 0.0 - 1.5 %    Immature Grans % 0.3 0.0 - 0.5 %    Neutrophils, Absolute 9.18 (H) 1.70 - 7.00 10*3/mm3    Lymphocytes, Absolute 2.07 0.70 - 3.10 10*3/mm3    Monocytes, Absolute 1.48 (H) 0.10 - 0.90 10*3/mm3    Eosinophils, Absolute 0.42 (H) 0.00 - 0.40 10*3/mm3    Basophils, Absolute 0.08 0.00 - 0.20 10*3/mm3    Immature Grans, Absolute 0.04 0.00 - 0.05 10*3/mm3    nRBC 0.0 0.0 - 0.2 /100 WBC   Scan Slide    Collection Time: 07/11/25  7:42 PM    Specimen: Blood   Result Value Ref Range    RBC Morphology Normal Normal    WBC Morphology Normal Normal    Platelet Estimate Adequate Normal    Clumped Platelets Present None Seen   Respiratory Panel PCR w/COVID-19(SARS-CoV-2) LIYAH/NICHOLAS/DIANE/PAD/COR/REZA In-House, NP Swab in [facility], 2 HR TAT - Swab, Nasopharynx    Collection Time: 07/11/25  7:44 PM    Specimen: Nasopharynx; Swab   Result Value Ref Range    ADENOVIRUS, PCR Not Detected Not Detected    Coronavirus 229E Not Detected Not Detected    Coronavirus HKU1 Not Detected Not Detected    Coronavirus NL63 Not Detected Not Detected    Coronavirus OC43 Not Detected Not Detected    COVID19 Not Detected Not Detected - Ref. Range    Human Metapneumovirus Not Detected Not Detected    Human Rhinovirus/Enterovirus Not Detected Not Detected    Influenza A PCR Not Detected Not Detected    Influenza B PCR Not Detected Not Detected    Parainfluenza Virus 1 Not Detected Not Detected    Parainfluenza Virus 2 Not Detected Not  Detected    Parainfluenza Virus 3 Not Detected Not Detected    Parainfluenza Virus 4 Not Detected Not Detected    RSV, PCR Not Detected Not Detected    Bordetella pertussis pcr Not Detected Not Detected    Bordetella parapertussis PCR Not Detected Not Detected    Chlamydophila pneumoniae PCR Not Detected Not Detected    Mycoplasma pneumo by PCR Not Detected Not Detected   High Sensitivity Troponin T    Collection Time: 07/11/25  8:15 PM    Specimen: Blood   Result Value Ref Range    HS Troponin T 101 (C) <22 ng/L   Comprehensive Metabolic Panel    Collection Time: 07/11/25  8:15 PM    Specimen: Blood   Result Value Ref Range    Glucose 132 (H) 65 - 99 mg/dL    BUN 8.8 6.0 - 20.0 mg/dL    Creatinine 0.78 0.76 - 1.27 mg/dL    Sodium 136 136 - 145 mmol/L    Potassium 3.7 3.5 - 5.2 mmol/L    Chloride 102 98 - 107 mmol/L    CO2 20.7 (L) 22.0 - 29.0 mmol/L    Calcium 9.5 8.6 - 10.5 mg/dL    Total Protein 7.8 6.0 - 8.5 g/dL    Albumin 4.3 3.5 - 5.2 g/dL    ALT (SGPT) 16 1 - 41 U/L    AST (SGOT) 23 1 - 40 U/L    Alkaline Phosphatase 100 39 - 117 U/L    Total Bilirubin 0.8 0.0 - 1.2 mg/dL    Globulin 3.5 gm/dL    A/G Ratio 1.2 g/dL    BUN/Creatinine Ratio 11.3 7.0 - 25.0    Anion Gap 13.3 5.0 - 15.0 mmol/L    eGFR 108.6 >60.0 mL/min/1.73   Lipase    Collection Time: 07/11/25  8:15 PM    Specimen: Blood   Result Value Ref Range    Lipase 25 13 - 60 U/L   BNP    Collection Time: 07/11/25  8:15 PM    Specimen: Blood   Result Value Ref Range    proBNP 158.0 0.0 - 900.0 pg/mL   Light Blue Top    Collection Time: 07/11/25  8:15 PM   Result Value Ref Range    Extra Tube Hold for add-ons.    Procalcitonin    Collection Time: 07/11/25  8:15 PM    Specimen: Blood   Result Value Ref Range    Procalcitonin 0.05 0.00 - 0.25 ng/mL   Lactic Acid, Plasma    Collection Time: 07/11/25  8:15 PM    Specimen: Blood   Result Value Ref Range    Lactate 0.9 0.5 - 2.0 mmol/L   Heparin Anti-Xa    Collection Time: 07/11/25  8:15 PM    Specimen: Blood    Result Value Ref Range    Heparin Anti-Xa (UFH) 0.10 (L) 0.30 - 0.70 IU/ml   Protime-INR    Collection Time: 07/11/25  8:15 PM    Specimen: Blood   Result Value Ref Range    Protime 13.5 12.2 - 15.3 Seconds    INR 0.98 0.89 - 1.12   aPTT    Collection Time: 07/11/25  8:15 PM    Specimen: Blood   Result Value Ref Range    PTT 32.2 (L) 60.0 - 90.0 seconds   ECG 12 Lead Chest Pain    Collection Time: 07/11/25  8:23 PM   Result Value Ref Range    QT Interval 334 ms    QTC Interval 424 ms   High Sensitivity Troponin T 1Hr    Collection Time: 07/11/25  9:22 PM    Specimen: Blood   Result Value Ref Range    HS Troponin T 93 (C) <22 ng/L    Troponin T Numeric Delta -8 ng/L    Troponin T % Delta -8 Abnormal if >/= 20%     Note: In addition to lab results from this visit, the labs listed above may include labs taken at another facility or during a different encounter within the last 24 hours. Please correlate lab times with ED admission and discharge times for further clarification of the services performed during this visit.    XR Chest 1 View   Final Result   Impression:   No acute cardiopulmonary findings.            Electronically Signed: Ant Olguin MD     7/11/2025 9:33 PM EDT     Workstation ID: [id number]        Vitals:    07/11/25 2245 07/11/25 2300 07/11/25 2325 07/11/25 2330   BP: 100/69 99/70 99/73 101/73   Pulse: 85 85 84 82   Resp:       Temp:       SpO2: 94% 95% 95% 95%   Weight:       Height:         Medications   sodium chloride 0.9 % flush 10 mL (has no administration in time range)   sodium chloride 0.9 % flush 10 mL (has no administration in time range)   heparin 61367 units/250 mL (100 units/mL) in 0.45 % NaCl infusion ( Intravenous Canceled Entry 7/11/25 2136)   Pharmacy to Dose Heparin (has no administration in time range)   nitroglycerin (NITROSTAT) SL tablet 0.4 mg (has no administration in time range)   carvedilol (COREG) tablet 3.125 mg (has no administration in time range)   aspirin chewable  tablet 81 mg (has no administration in time range)   hydrALAZINE (APRESOLINE) injection 10 mg (has no administration in time range)   aspirin chewable tablet 324 mg (243 mg Oral Given 7/11/25 1944)   sodium chloride 0.9 % bolus 1,000 mL (1,000 mL Intravenous New Bag 7/11/25 2125)   morphine injection 2 mg (2 mg Intravenous Given 7/11/25 2125)   ondansetron (ZOFRAN) injection 4 mg (4 mg Intravenous Given 7/11/25 2125)   heparin (porcine) injection 4,000 Units (4,000 Units Intravenous Given 7/11/25 2132)     ECG/EMG Results (last 24 hours)       Procedure Component Value Units Date/Time    ECG 12 Lead Chest Pain [[id number]] Collected: 07/11/25 1931     Updated: 07/11/25 1932     QT Interval 322 ms      QTC Interval 443 ms     Narrative:      Test Reason : Chest Pain  Blood Pressure :   */*   mmHG  Vent. Rate : 114 BPM     Atrial Rate : 114 BPM     P-R Int : 136 ms          QRS Dur :  90 ms      QT Int : 322 ms       P-R-T Axes :  43  13  69 degrees    QTcB Int : 443 ms    Sinus tachycardia with premature atrial complexes  Moderate voltage criteria for LVH, may be normal variant ( R in aVL ,  Sokolow-Barry )  ST elevation, probably due to early repolarization  Nonspecific ST and T wave abnormality  Abnormal ECG  When compared with ECG of 12-Jun-2023 11:33,  premature atrial complexes are now present  Vent. rate has increased by  43 bpm  ST elevation now present in Inferior leads    Referred By:            Confirmed By:           Telemetry Scan   Final Result      ECG 12 Lead Chest Pain   Final Result   Test Reason : Chest Pain   Blood Pressure :   */*   mmHG   Vent. Rate :  97 BPM     Atrial Rate :  97 BPM      P-R Int : 136 ms          QRS Dur :  92 ms       QT Int : 334 ms       P-R-T Axes :  53  11  57 degrees     QTcB Int : 424 ms      Normal sinus rhythm   Minimal voltage criteria for LVH, may be normal variant   mild pr dep and st elev diffusely,    Abnormal ECG   When compared with ECG of 11-Jul-2025 19:31,    premature atrial complexes are no longer present   Confirmed by DIVYA TRIPATHI MD ([id number]) on 7/11/2025 9:15:10 PM      Referred By: EDMD           Confirmed By: DIVYA TRIPATHI MD      ECG 12 Lead Chest Pain   Final Result   Test Reason : Chest Pain   Blood Pressure :   */*   mmHG   Vent. Rate : 114 BPM     Atrial Rate : 114 BPM      P-R Int : 136 ms          QRS Dur :  90 ms       QT Int : 322 ms       P-R-T Axes :  43  13  69 degrees     QTcB Int : 443 ms      Sinus tachycardia with premature atrial complexes   Moderate voltage criteria for LVH, may be normal variant ( R in aVL ,   Sokolow-Barry )   ST elevation, probably due to early repolarization   Nonspecific ST and T wave abnormality   Abnormal ECG   When compared with ECG of 12-Jun-2023 11:33,   premature atrial complexes are now present   Vent. rate has increased by  43 bpm   ST elevation now present in Inferior leads   Confirmed by DIVYA TRIPATHI MD ([id number]) on 7/11/2025 8:02:13 PM      Referred By:            Confirmed By: DIVYA TRIPATHI MD      ECG 12 Lead Chest Pain    (Results Pending)       HEART Score for Major Cardiac Events - MDCalc  Calculated on Jul 11 2025 11:36 PM  7 points -> High Score (7-10 points) Risk of MACE of 50-65%.            Medical Decision Making  Amount and/or Complexity of Data Reviewed  Labs: ordered.  Radiology: ordered.  ECG/medicine tests: ordered.    Risk  OTC drugs.  Prescription drug management.  Decision regarding hospitalization.        Final diagnoses:   Non-STEMI (non-ST elevated myocardial infarction)   Atypical chest pain   Shortness of breath   Nausea   History of hypertension   History of hyperlipidemia   Tobacco abuse   History of COPD   History of hepatitis C       ED Disposition  ED Disposition       ED Disposition   Decision to Admit    Condition   --    Comment   Level of Care: Telemetry [5]   Diagnosis: NSTEMI (non-ST elevated myocardial infarction) [[id number]]   Certification: I Certify That Inpatient Hospital  Services Are Medically Necessary For Greater Than 2 Midnights                 No follow-up provider specified.       Medication List      No changes were made to your prescriptions during this visit.            Leda Win, PAPA  07/11/25 [id number]

## 2025-07-11 NOTE — Clinical Note
First balloon inflation max pressure = 15 noe. First balloon inflation duration = 15 seconds. Second inflation of balloon - Max pressure = 15 noe. 2nd Inflation of balloon - Duration = 15 seconds. 2nd inflation was done at 15:57 EDT. The balloon is positioned in the Proximal segment of the vessel.

## 2025-07-11 NOTE — Clinical Note
First balloon inflation max pressure = 12 noe. First balloon inflation duration = 10 seconds. Second inflation of balloon - Max pressure = 12 noe. 2nd Inflation of balloon - Duration = 7 seconds.

## 2025-07-12 ENCOUNTER — APPOINTMENT (OUTPATIENT)
Dept: CARDIOLOGY | Facility: HOSPITAL | Age: 50
DRG: 322 | End: 2025-07-12

## 2025-07-12 PROBLEM — B19.20 HEPATITIS C: Status: ACTIVE | Noted: 2025-07-12

## 2025-07-12 PROBLEM — I10 ESSENTIAL HYPERTENSION: Status: ACTIVE | Noted: 2025-07-12

## 2025-07-12 PROBLEM — I25.119 CORONARY ARTERY DISEASE INVOLVING NATIVE CORONARY ARTERY OF NATIVE HEART WITH ANGINA PECTORIS: Status: ACTIVE | Noted: 2025-07-12

## 2025-07-12 PROBLEM — E78.5 HYPERLIPIDEMIA LDL GOAL <50: Status: ACTIVE | Noted: 2025-07-12

## 2025-07-12 LAB
ANION GAP SERPL CALCULATED.3IONS-SCNC: 10 MMOL/L (ref 5–15)
BASOPHILS # BLD AUTO: 0.05 10*3/MM3 (ref 0–0.2)
BASOPHILS # BLD AUTO: 0.06 10*3/MM3 (ref 0–0.2)
BASOPHILS NFR BLD AUTO: 0.4 % (ref 0–1.5)
BASOPHILS NFR BLD AUTO: 0.6 % (ref 0–1.5)
BUN SERPL-MCNC: 8.3 MG/DL (ref 6–20)
BUN/CREAT SERPL: 12 (ref 7–25)
CALCIUM SPEC-SCNC: 8.7 MG/DL (ref 8.6–10.5)
CHLORIDE SERPL-SCNC: 105 MMOL/L (ref 98–107)
CHOLEST SERPL-MCNC: 220 MG/DL (ref 0–200)
CO2 SERPL-SCNC: 21 MMOL/L (ref 22–29)
CREAT SERPL-MCNC: 0.69 MG/DL (ref 0.76–1.27)
DEPRECATED RDW RBC AUTO: 38.4 FL (ref 37–54)
DEPRECATED RDW RBC AUTO: 38.8 FL (ref 37–54)
EGFRCR SERPLBLD CKD-EPI 2021: 112.7 ML/MIN/1.73
EOSINOPHIL # BLD AUTO: 0.43 10*3/MM3 (ref 0–0.4)
EOSINOPHIL # BLD AUTO: 0.49 10*3/MM3 (ref 0–0.4)
EOSINOPHIL NFR BLD AUTO: 4.1 % (ref 0.3–6.2)
EOSINOPHIL NFR BLD AUTO: 4.4 % (ref 0.3–6.2)
ERYTHROCYTE [DISTWIDTH] IN BLOOD BY AUTOMATED COUNT: 12 % (ref 12.3–15.4)
ERYTHROCYTE [DISTWIDTH] IN BLOOD BY AUTOMATED COUNT: 12.1 % (ref 12.3–15.4)
GLUCOSE SERPL-MCNC: 116 MG/DL (ref 65–99)
HBA1C MFR BLD: 5.31 % (ref 4.8–5.6)
HCT VFR BLD AUTO: 39.4 % (ref 37.5–51)
HCT VFR BLD AUTO: 39.8 % (ref 37.5–51)
HDLC SERPL-MCNC: 34 MG/DL (ref 40–60)
HGB BLD-MCNC: 13.3 G/DL (ref 13–17.7)
HGB BLD-MCNC: 13.5 G/DL (ref 13–17.7)
IMM GRANULOCYTES # BLD AUTO: 0.03 10*3/MM3 (ref 0–0.05)
IMM GRANULOCYTES # BLD AUTO: 0.03 10*3/MM3 (ref 0–0.05)
IMM GRANULOCYTES NFR BLD AUTO: 0.3 % (ref 0–0.5)
IMM GRANULOCYTES NFR BLD AUTO: 0.3 % (ref 0–0.5)
LDLC SERPL CALC-MCNC: 173 MG/DL (ref 0–100)
LDLC/HDLC SERPL: 5.05 {RATIO}
LYMPHOCYTES # BLD AUTO: 2.1 10*3/MM3 (ref 0.7–3.1)
LYMPHOCYTES # BLD AUTO: 2.23 10*3/MM3 (ref 0.7–3.1)
LYMPHOCYTES NFR BLD AUTO: 19.8 % (ref 19.6–45.3)
LYMPHOCYTES NFR BLD AUTO: 19.9 % (ref 19.6–45.3)
MAGNESIUM SERPL-MCNC: 2.2 MG/DL (ref 1.6–2.6)
MCH RBC QN AUTO: 29.6 PG (ref 26.6–33)
MCH RBC QN AUTO: 29.8 PG (ref 26.6–33)
MCHC RBC AUTO-ENTMCNC: 33.8 G/DL (ref 31.5–35.7)
MCHC RBC AUTO-ENTMCNC: 33.9 G/DL (ref 31.5–35.7)
MCV RBC AUTO: 87.8 FL (ref 79–97)
MCV RBC AUTO: 87.9 FL (ref 79–97)
MONOCYTES # BLD AUTO: 1.48 10*3/MM3 (ref 0.1–0.9)
MONOCYTES # BLD AUTO: 1.53 10*3/MM3 (ref 0.1–0.9)
MONOCYTES NFR BLD AUTO: 13.7 % (ref 5–12)
MONOCYTES NFR BLD AUTO: 14 % (ref 5–12)
NEUTROPHILS NFR BLD AUTO: 6.49 10*3/MM3 (ref 1.7–7)
NEUTROPHILS NFR BLD AUTO: 6.86 10*3/MM3 (ref 1.7–7)
NEUTROPHILS NFR BLD AUTO: 61.2 % (ref 42.7–76)
NEUTROPHILS NFR BLD AUTO: 61.3 % (ref 42.7–76)
NRBC BLD AUTO-RTO: 0 /100 WBC (ref 0–0.2)
NRBC BLD AUTO-RTO: 0 /100 WBC (ref 0–0.2)
PLATELET # BLD AUTO: 256 10*3/MM3 (ref 140–450)
PLATELET # BLD AUTO: 262 10*3/MM3 (ref 140–450)
PMV BLD AUTO: 9.7 FL (ref 6–12)
PMV BLD AUTO: 9.8 FL (ref 6–12)
POTASSIUM SERPL-SCNC: 4.1 MMOL/L (ref 3.5–5.2)
QT INTERVAL: 366 MS
QTC INTERVAL: 435 MS
RBC # BLD AUTO: 4.49 10*6/MM3 (ref 4.14–5.8)
RBC # BLD AUTO: 4.53 10*6/MM3 (ref 4.14–5.8)
SODIUM SERPL-SCNC: 136 MMOL/L (ref 136–145)
TRIGL SERPL-MCNC: 71 MG/DL (ref 0–150)
TROPONIN T SERPL HS-MCNC: 74 NG/L
UFH PPP CHRO-ACNC: 0.1 IU/ML (ref 0.3–0.7)
UFH PPP CHRO-ACNC: 0.34 IU/ML (ref 0.3–0.7)
VLDLC SERPL-MCNC: 13 MG/DL (ref 5–40)
WBC NRBC COR # BLD AUTO: 10.59 10*3/MM3 (ref 3.4–10.8)
WBC NRBC COR # BLD AUTO: 11.19 10*3/MM3 (ref 3.4–10.8)

## 2025-07-12 PROCEDURE — C1769 GUIDE WIRE: HCPCS | Performed by: INTERNAL MEDICINE

## 2025-07-12 PROCEDURE — 85025 COMPLETE CBC W/AUTO DIFF WBC: CPT | Performed by: STUDENT IN AN ORGANIZED HEALTH CARE EDUCATION/TRAINING PROGRAM

## 2025-07-12 PROCEDURE — 99232 SBSQ HOSP IP/OBS MODERATE 35: CPT | Performed by: INTERNAL MEDICINE

## 2025-07-12 PROCEDURE — 93010 ELECTROCARDIOGRAM REPORT: CPT | Performed by: INTERNAL MEDICINE

## 2025-07-12 PROCEDURE — 25010000002 MIDAZOLAM PER 1 MG: Performed by: INTERNAL MEDICINE

## 2025-07-12 PROCEDURE — 80048 BASIC METABOLIC PNL TOTAL CA: CPT | Performed by: STUDENT IN AN ORGANIZED HEALTH CARE EDUCATION/TRAINING PROGRAM

## 2025-07-12 PROCEDURE — 92928 PRQ TCAT PLMT NTRAC ST 1 LES: CPT | Performed by: INTERNAL MEDICINE

## 2025-07-12 PROCEDURE — 25010000002 LIDOCAINE PF 1% 1 % SOLUTION: Performed by: INTERNAL MEDICINE

## 2025-07-12 PROCEDURE — 92978 ENDOLUMINL IVUS OCT C 1ST: CPT | Performed by: INTERNAL MEDICINE

## 2025-07-12 PROCEDURE — 25010000002 BIVALIRUDIN TRIFLUOROACETATE 250 MG RECONSTITUTED SOLUTION 1 EACH VIAL: Performed by: INTERNAL MEDICINE

## 2025-07-12 PROCEDURE — 25510000001 IOPAMIDOL PER 1 ML: Performed by: INTERNAL MEDICINE

## 2025-07-12 PROCEDURE — 85520 HEPARIN ASSAY: CPT

## 2025-07-12 PROCEDURE — C1753 CATH, INTRAVAS ULTRASOUND: HCPCS | Performed by: INTERNAL MEDICINE

## 2025-07-12 PROCEDURE — 83695 ASSAY OF LIPOPROTEIN(A): CPT | Performed by: INTERNAL MEDICINE

## 2025-07-12 PROCEDURE — B2111ZZ FLUOROSCOPY OF MULTIPLE CORONARY ARTERIES USING LOW OSMOLAR CONTRAST: ICD-10-PCS | Performed by: INTERNAL MEDICINE

## 2025-07-12 PROCEDURE — 25010000002 HEPARIN (PORCINE) PER 1000 UNITS

## 2025-07-12 PROCEDURE — C9600 PERC DRUG-EL COR STENT SING: HCPCS | Performed by: INTERNAL MEDICINE

## 2025-07-12 PROCEDURE — 93458 L HRT ARTERY/VENTRICLE ANGIO: CPT | Performed by: INTERNAL MEDICINE

## 2025-07-12 PROCEDURE — 83735 ASSAY OF MAGNESIUM: CPT | Performed by: INTERNAL MEDICINE

## 2025-07-12 PROCEDURE — 25010000002 FENTANYL CITRATE (PF) 50 MCG/ML SOLUTION: Performed by: INTERNAL MEDICINE

## 2025-07-12 PROCEDURE — B221Z2Z COMPUTERIZED TOMOGRAPHY (CT SCAN) OF MULTIPLE CORONARY ARTERIES USING INTRAVASCULAR OPTICAL COHERENCE: ICD-10-PCS | Performed by: INTERNAL MEDICINE

## 2025-07-12 PROCEDURE — 80061 LIPID PANEL: CPT | Performed by: INTERNAL MEDICINE

## 2025-07-12 PROCEDURE — C1887 CATHETER, GUIDING: HCPCS | Performed by: INTERNAL MEDICINE

## 2025-07-12 PROCEDURE — 99254 IP/OBS CNSLTJ NEW/EST MOD 60: CPT | Performed by: INTERNAL MEDICINE

## 2025-07-12 PROCEDURE — 84484 ASSAY OF TROPONIN QUANT: CPT | Performed by: STUDENT IN AN ORGANIZED HEALTH CARE EDUCATION/TRAINING PROGRAM

## 2025-07-12 PROCEDURE — 92972 PERQ TRLUML CORONRY LITHOTRP: CPT | Performed by: INTERNAL MEDICINE

## 2025-07-12 PROCEDURE — 25010000002 MORPHINE PER 10 MG: Performed by: NURSE PRACTITIONER

## 2025-07-12 PROCEDURE — 4A023N7 MEASUREMENT OF CARDIAC SAMPLING AND PRESSURE, LEFT HEART, PERCUTANEOUS APPROACH: ICD-10-PCS | Performed by: INTERNAL MEDICINE

## 2025-07-12 PROCEDURE — 85025 COMPLETE CBC W/AUTO DIFF WBC: CPT | Performed by: PHYSICIAN ASSISTANT

## 2025-07-12 PROCEDURE — 25010000002 NICARDIPINE 2.5 MG/ML SOLUTION: Performed by: INTERNAL MEDICINE

## 2025-07-12 PROCEDURE — 25010000002 HEPARIN (PORCINE) PER 1000 UNITS: Performed by: INTERNAL MEDICINE

## 2025-07-12 PROCEDURE — C1725 CATH, TRANSLUMIN NON-LASER: HCPCS | Performed by: INTERNAL MEDICINE

## 2025-07-12 PROCEDURE — C1874 STENT, COATED/COV W/DEL SYS: HCPCS | Performed by: INTERNAL MEDICINE

## 2025-07-12 PROCEDURE — C1894 INTRO/SHEATH, NON-LASER: HCPCS | Performed by: INTERNAL MEDICINE

## 2025-07-12 PROCEDURE — 25810000003 SODIUM CHLORIDE 0.9 % SOLUTION: Performed by: INTERNAL MEDICINE

## 2025-07-12 PROCEDURE — 027034Z DILATION OF CORONARY ARTERY, ONE ARTERY WITH DRUG-ELUTING INTRALUMINAL DEVICE, PERCUTANEOUS APPROACH: ICD-10-PCS | Performed by: INTERNAL MEDICINE

## 2025-07-12 PROCEDURE — 83036 HEMOGLOBIN GLYCOSYLATED A1C: CPT | Performed by: INTERNAL MEDICINE

## 2025-07-12 DEVICE — XIENCE SKYPOINT™ EVEROLIMUS ELUTING CORONARY STENT SYSTEM 4.00 MM X 23 MM / RAPID-EXCHANGE
Type: IMPLANTABLE DEVICE | Site: CORONARY | Status: FUNCTIONAL
Brand: XIENCE SKYPOINT™

## 2025-07-12 RX ORDER — ACETAMINOPHEN 325 MG/1
650 TABLET ORAL EVERY 6 HOURS PRN
Status: DISCONTINUED | OUTPATIENT
Start: 2025-07-12 | End: 2025-07-13 | Stop reason: HOSPADM

## 2025-07-12 RX ORDER — ROSUVASTATIN CALCIUM 20 MG/1
20 TABLET, COATED ORAL DAILY
Status: DISCONTINUED | OUTPATIENT
Start: 2025-07-13 | End: 2025-07-13 | Stop reason: HOSPADM

## 2025-07-12 RX ORDER — CLOPIDOGREL BISULFATE 75 MG/1
TABLET ORAL
Status: DISCONTINUED | OUTPATIENT
Start: 2025-07-12 | End: 2025-07-12 | Stop reason: HOSPADM

## 2025-07-12 RX ORDER — HEPARIN SODIUM 1000 [USP'U]/ML
INJECTION, SOLUTION INTRAVENOUS; SUBCUTANEOUS
Status: DISCONTINUED | OUTPATIENT
Start: 2025-07-12 | End: 2025-07-12 | Stop reason: HOSPADM

## 2025-07-12 RX ORDER — LOSARTAN POTASSIUM 25 MG/1
25 TABLET ORAL
Status: DISCONTINUED | OUTPATIENT
Start: 2025-07-12 | End: 2025-07-13 | Stop reason: HOSPADM

## 2025-07-12 RX ORDER — TRAZODONE HYDROCHLORIDE 100 MG/1
100 TABLET ORAL NIGHTLY PRN
Status: DISCONTINUED | OUTPATIENT
Start: 2025-07-12 | End: 2025-07-13 | Stop reason: HOSPADM

## 2025-07-12 RX ORDER — HYDROCODONE BITARTRATE AND ACETAMINOPHEN 5; 325 MG/1; MG/1
1 TABLET ORAL EVERY 6 HOURS PRN
Refills: 0 | Status: DISCONTINUED | OUTPATIENT
Start: 2025-07-12 | End: 2025-07-13 | Stop reason: HOSPADM

## 2025-07-12 RX ORDER — MIDAZOLAM HYDROCHLORIDE 1 MG/ML
INJECTION, SOLUTION INTRAMUSCULAR; INTRAVENOUS
Status: DISCONTINUED | OUTPATIENT
Start: 2025-07-12 | End: 2025-07-12 | Stop reason: HOSPADM

## 2025-07-12 RX ORDER — IPRATROPIUM BROMIDE AND ALBUTEROL SULFATE 2.5; .5 MG/3ML; MG/3ML
3 SOLUTION RESPIRATORY (INHALATION) EVERY 4 HOURS PRN
Status: DISCONTINUED | OUTPATIENT
Start: 2025-07-12 | End: 2025-07-13 | Stop reason: HOSPADM

## 2025-07-12 RX ORDER — CLOPIDOGREL BISULFATE 75 MG/1
75 TABLET ORAL DAILY
Status: DISCONTINUED | OUTPATIENT
Start: 2025-07-13 | End: 2025-07-13 | Stop reason: HOSPADM

## 2025-07-12 RX ORDER — HEPARIN SODIUM 1000 [USP'U]/ML
4000 INJECTION, SOLUTION INTRAVENOUS; SUBCUTANEOUS ONCE
Status: COMPLETED | OUTPATIENT
Start: 2025-07-12 | End: 2025-07-12

## 2025-07-12 RX ORDER — FENTANYL CITRATE 50 UG/ML
INJECTION, SOLUTION INTRAMUSCULAR; INTRAVENOUS
Status: DISCONTINUED | OUTPATIENT
Start: 2025-07-12 | End: 2025-07-12 | Stop reason: HOSPADM

## 2025-07-12 RX ORDER — ACETAMINOPHEN 650 MG/1
325 SUPPOSITORY RECTAL EVERY 4 HOURS PRN
Status: DISCONTINUED | OUTPATIENT
Start: 2025-07-12 | End: 2025-07-13 | Stop reason: HOSPADM

## 2025-07-12 RX ORDER — IOPAMIDOL 755 MG/ML
INJECTION, SOLUTION INTRAVASCULAR
Status: DISCONTINUED | OUTPATIENT
Start: 2025-07-12 | End: 2025-07-12 | Stop reason: HOSPADM

## 2025-07-12 RX ORDER — LIDOCAINE HYDROCHLORIDE 10 MG/ML
INJECTION, SOLUTION EPIDURAL; INFILTRATION; INTRACAUDAL; PERINEURAL
Status: DISCONTINUED | OUTPATIENT
Start: 2025-07-12 | End: 2025-07-12 | Stop reason: HOSPADM

## 2025-07-12 RX ORDER — SODIUM CHLORIDE 9 MG/ML
100 INJECTION, SOLUTION INTRAVENOUS CONTINUOUS
Status: ACTIVE | OUTPATIENT
Start: 2025-07-12 | End: 2025-07-12

## 2025-07-12 RX ORDER — MORPHINE SULFATE 2 MG/ML
1 INJECTION, SOLUTION INTRAMUSCULAR; INTRAVENOUS ONCE
Status: COMPLETED | OUTPATIENT
Start: 2025-07-12 | End: 2025-07-12

## 2025-07-12 RX ORDER — NICOTINE 21 MG/24HR
1 PATCH, TRANSDERMAL 24 HOURS TRANSDERMAL DAILY PRN
Status: DISCONTINUED | OUTPATIENT
Start: 2025-07-12 | End: 2025-07-13 | Stop reason: HOSPADM

## 2025-07-12 RX ORDER — SODIUM CHLORIDE 9 MG/ML
3 INJECTION, SOLUTION INTRAVENOUS CONTINUOUS
Status: ACTIVE | OUTPATIENT
Start: 2025-07-12 | End: 2025-07-12

## 2025-07-12 RX ADMIN — HYDROCODONE BITARTRATE AND ACETAMINOPHEN 1 TABLET: 5; 325 TABLET ORAL at 16:56

## 2025-07-12 RX ADMIN — Medication 10 ML: at 00:17

## 2025-07-12 RX ADMIN — Medication 10 ML: at 08:07

## 2025-07-12 RX ADMIN — CARVEDILOL 3.12 MG: 3.12 TABLET, FILM COATED ORAL at 17:00

## 2025-07-12 RX ADMIN — MORPHINE SULFATE 1 MG: 2 INJECTION, SOLUTION INTRAMUSCULAR; INTRAVENOUS at 00:46

## 2025-07-12 RX ADMIN — NICOTINE 1 PATCH: 21 PATCH TRANSDERMAL at 17:11

## 2025-07-12 RX ADMIN — ROSUVASTATIN CALCIUM 10 MG: 10 TABLET, FILM COATED ORAL at 08:07

## 2025-07-12 RX ADMIN — FLUOXETINE HYDROCHLORIDE 10 MG: 10 CAPSULE ORAL at 08:06

## 2025-07-12 RX ADMIN — LOSARTAN POTASSIUM 25 MG: 25 TABLET, FILM COATED ORAL at 16:57

## 2025-07-12 RX ADMIN — SODIUM CHLORIDE 100 ML/HR: 9 INJECTION, SOLUTION INTRAVENOUS at 12:59

## 2025-07-12 RX ADMIN — TRAZODONE HYDROCHLORIDE 100 MG: 100 TABLET ORAL at 21:27

## 2025-07-12 RX ADMIN — HEPARIN SODIUM 4000 UNITS: 1000 INJECTION, SOLUTION INTRAVENOUS; SUBCUTANEOUS at 06:02

## 2025-07-12 RX ADMIN — CARVEDILOL 3.12 MG: 3.12 TABLET, FILM COATED ORAL at 08:07

## 2025-07-12 RX ADMIN — ASPIRIN 81 MG: 81 TABLET, CHEWABLE ORAL at 08:07

## 2025-07-12 RX ADMIN — SODIUM CHLORIDE 3 ML/KG/HR: 9 INJECTION, SOLUTION INTRAVENOUS at 16:57

## 2025-07-12 RX ADMIN — AMLODIPINE BESYLATE 10 MG: 10 TABLET ORAL at 08:07

## 2025-07-12 NOTE — H&P
[facility]    Patient Identification:  Name:  Wilmer Stover  Age:  50 y.o.  Sex:  male  :  1975  MRN:  [id number]   Visit Number:  [id number]  Admit Date: 2025   Room number:    Primary Care Physician:  Mata Maher MD    Date of Admission: 2025     Subjective     Chief complaint:    Chief Complaint   Patient presents with    Chest Pain    Shortness of Breath       History of presenting illness:    This is a 50-year-old male with a past medical history of tobacco use, hypertension, hyperlipidemia, depression, BPH presented with chest pain.  Patient states that for the last 3 days she has been having intermittent chest pains that radiates to his neck and associated with shortness of breath and sometimes nausea.  Patient also reports having chest wall tenderness despite not having any recent injuries.  Patient reports having an left heart cath done roughly 2 years ago which was normal.    In ED, blood pressure 116/77, heart rate 86, and O2 saturation 92% on room air.  Labs on arrival showed a troponin 101, sodium 136, potassium 3.7, BUN 8.8, creatinine 0.7, WBC 13.2, hemoglobin 15.6 and platelet count 239.  Chest x-ray showed no acute findings.  ---------------------------------------------------------------------------------------------------------------------   Review of Systems   Constitutional:  Negative for appetite change and unexpected weight change.   HENT:  Negative for drooling and rhinorrhea.    Eyes:  Negative for pain.   Respiratory:  Positive for shortness of breath.    Cardiovascular:  Positive for chest pain.   Gastrointestinal:  Negative for anal bleeding.   Endocrine: Negative for polydipsia.   Genitourinary:  Negative for flank pain and testicular pain.   Musculoskeletal:  Negative for back pain.   Allergic/Immunologic: Negative for food allergies.   Neurological:  Negative for headaches.   Hematological:  Negative for  adenopathy.   Psychiatric/Behavioral:  Negative for decreased concentration.      ---------------------------------------------------------------------------------------------------------------------   Past Medical History:   Diagnosis Date    Allergic     Anxiety     Arthritis     Asthma     COPD (chronic obstructive pulmonary disease)     Depression     Gall stones     Hepatitis C     Hodgkin's lymphoma     Hyperlipidemia     Hypertension     Kidney stone     Memory loss     Neuropathy     Numbness and tingling     Thyroid disease     Weakness      Past Surgical History:   Procedure Laterality Date    CHOLECYSTECTOMY      CYSTOSCOPY BLADDER STONE LITHOTRIPSY      MASTECTOMY MODIFIED RADICAL W/ AXILLARY LYMPH NODES W/ OR W/O PECTORALIS MINOR       Family History   Problem Relation Age of Onset    Hypertension Mother     Hyperlipidemia Mother     Alcohol abuse Mother     Arthritis Mother     Alcohol abuse Father     Heart disease Maternal Grandmother      Social History     Socioeconomic History    Marital status:    Tobacco Use    Smoking status: Some Days     Current packs/day: 0.50     Types: Cigarettes    Smokeless tobacco: Never   Vaping Use    Vaping status: Never Used   Substance and Sexual Activity    Alcohol use: No    Drug use: Not Currently     Comment: sobriety date 12/22/2022    Sexual activity: Not Currently     ---------------------------------------------------------------------------------------------------------------------   Allergies:  Penicillins  ---------------------------------------------------------------------------------------------------------------------   Medications below are reported home medications pulling from within the system; at this time, these medications have not been reconciled unless otherwise specified and are in the verification process for further verifcation as current home medications.      Prior to Admission Medications       Prescriptions Last Dose Informant  Patient Reported? Taking?    albuterol sulfate  (90 Base) MCG/ACT inhaler   No Yes    Inhale 2 puffs Every 4 (Four) Hours As Needed for Wheezing or Shortness of Air.    amLODIPine (NORVASC) 10 MG tablet   No Yes    Take 1 tablet by mouth Daily.    cetirizine (zyrTEC) 10 MG tablet   No Yes    Take 1 tablet by mouth Daily. For allergies    FLUoxetine (PROzac) 10 MG capsule   No Yes    Take 1 capsule by mouth Daily.    PrilOSEC OTC 20 MG EC tablet   Yes Yes    Take 1 tablet by mouth Every Morning.    traZODone (DESYREL) 100 MG tablet   No Yes    Take 1 tablet by mouth every night at bedtime.    Diclofenac Sodium (VOLTAREN) 1 % gel gel   No No    Apply 4 g topically to the appropriate area as directed 4 (Four) Times a Day As Needed (joint pain).    Patient not taking:  Reported on 6/13/2025    fluticasone (FLONASE) 50 MCG/ACT nasal spray   No No    2 sprays into the nostril(s) as directed by provider Daily.    meloxicam (MOBIC) 7.5 MG tablet   No No    TAKE 1 TABLET BY MOUTH DAILY    Patient not taking:  Reported on 6/13/2025    rOPINIRole (REQUIP) 0.5 MG tablet   No No    Take 1 tablet by mouth Every Night. Take 1 hour before bedtime.    Patient not taking:  Reported on 6/13/2025    rosuvastatin (Crestor) 10 MG tablet   No No    Take 1 tablet by mouth Daily.    sodium-potassium-magnesium sulfates (Suprep Bowel Prep Kit) 17.5-3.13-1.6 GM/177ML solution oral solution   No No    Take 1 bottle by mouth Take As Directed.    tamsulosin (FLOMAX) 0.4 MG capsule 24 hr capsule   No No    Take 1 capsule by mouth every night at bedtime.    Patient not taking:  Reported on 6/13/2025    triamcinolone (KENALOG) 0.1 % ointment   No No    Apply 1 Application topically to the appropriate area as directed 2 (Two) Times a Day.          Objective     Vital Signs:  Temp:  [99.7 °F (37.6 °C)] 99.7 °F (37.6 °C)  Heart Rate:  [] 85  Resp:  [18] 18  BP: (100-145)/(67-99) 100/69    Mean Arterial Pressure (Non-Invasive) for the past  24 hrs (Last 3 readings):   Noninvasive MAP (mmHg)   07/11/25 2245 80   07/11/25 2230 81   07/11/25 2215 79     SpO2:  [92 %-98 %] 94 %  on   ;      Body mass index is 29.76 kg/m².    Wt Readings from Last 3 Encounters:   07/11/25 86.2 kg (190 lb)   06/13/25 86.5 kg (190 lb 9.6 oz)   02/05/25 87.2 kg (192 lb 3.2 oz)      ----------------------------------------------------------------------------------------------------------------------  PHYSICAL EXAMINATION:  GENERAL: The patient is well developed and nontoxic.  HEENT: Anicteric sclerae, PERRLA, EOMI. Oropharynx clear. Moist mucous membranes. Conjunctivae appear well perfused.  CHEST: Chest wall is nontender.  HEART: Regular rate and rhythm without murmurs.  LUNGS: Clear to auscultation bilaterally.  ABDOMEN: Soft, positive bowel sounds, nontender, no organomegaly.  RECTAL: Deferred.  SKIN: No rash, no excessive bruising, petechiae, or purpura.  NEUROLOGIC: Cranial nerves II-XII intact without motor/sensory deficit.    ---------------------------------------------------------------------------------------------------------------------  --------------------------------------------------------------------------------------------------------------------  LABS:    CBC and coagulation:  Results from last 7 days   Lab Units 07/11/25 2015 07/11/25 1942   PROCALCITONIN ng/mL 0.05  --    LACTATE mmol/L 0.9  --    WBC 10*3/mm3  --  13.27*   HEMOGLOBIN g/dL  --  15.6   HEMATOCRIT %  --  45.9   MCV fL  --  86.4   MCHC g/dL  --  34.0   PLATELETS 10*3/mm3  --  239   INR  0.98  --      Acid/base balance:      Renal and electrolytes:  Results from last 7 days   Lab Units 07/11/25 2015   SODIUM mmol/L 136   POTASSIUM mmol/L 3.7   CHLORIDE mmol/L 102   CO2 mmol/L 20.7*   BUN mg/dL 8.8   CREATININE mg/dL 0.78   CALCIUM mg/dL 9.5   GLUCOSE mg/dL 132*     Estimated Creatinine Clearance: 118.8 mL/min (by C-G formula based on SCr of 0.78 mg/dL).    Liver and pancreatic  function:  Results from last 7 days   Lab Units 07/11/25 2015   ALBUMIN g/dL 4.3   BILIRUBIN mg/dL 0.8   ALK PHOS U/L 100   AST (SGOT) U/L 23   ALT (SGPT) U/L 16   LIPASE U/L 25     Endocrine function:  Lab Results   Component Value Date    HGBA1C 5.60 03/28/2024     Point of care bedside glucose levels:      Lab Results   Component Value Date    TSH 3.760 10/31/2024    FREET4 1.19 10/31/2024     Cardiac:  Results from last 7 days   Lab Units 07/11/25 2122 07/11/25 2015   HSTROP T ng/L 93* 101*   PROBNP pg/mL  --  158.0       Cultures:  Lab Results   Component Value Date    COLORU Dk Yellow 04/15/2015    CLARITYU Clear 04/15/2015    GLUCOSEU Negative 04/15/2015    KETONESU Trace (A) 04/15/2015    BLOODU Small (A) 04/15/2015    NITRITEU Negative 04/15/2015    LEUKOCYTESUR Negative 04/15/2015    BILIRUBINUR Small (A) 04/15/2015    UROBILINOGEN 1.0 04/15/2015    RBCUA 3-6 (A) 04/15/2015    BACTERIA None Seen 04/15/2015     Microbiology Results (last 10 days)       Procedure Component Value - Date/Time    COVID PRE-OP / PRE-PROCEDURE SCREENING ORDER (NO ISOLATION) - Swab, Nasopharynx [[id number]]  (Normal) Collected: 07/11/25 1944    Lab Status: Final result Specimen: Swab from Nasopharynx Updated: 07/11/25 2046    Narrative:      The following orders were created for panel order COVID PRE-OP / PRE-PROCEDURE SCREENING ORDER (NO ISOLATION) - Swab, Nasopharynx.  Procedure                               Abnormality         Status                     ---------                               -----------         ------                     Respiratory Panel PCR w/...[[id number]]  Normal              Final result                 Please view results for these tests on the individual orders.    Respiratory Panel PCR w/COVID-19(SARS-CoV-2) LIYAH/NICHOLAS/DIANE/PAD/COR/REZA In-House, NP Swab in UTM/VTM, 2 HR TAT - Swab, Nasopharynx [[id number]]  (Normal) Collected: 07/11/25 1944    Lab Status: Final result Specimen: Swab from Nasopharynx  "Updated: 07/11/25 2046     ADENOVIRUS, PCR Not Detected     Coronavirus 229E Not Detected     Coronavirus HKU1 Not Detected     Coronavirus NL63 Not Detected     Coronavirus OC43 Not Detected     COVID19 Not Detected     Human Metapneumovirus Not Detected     Human Rhinovirus/Enterovirus Not Detected     Influenza A PCR Not Detected     Influenza B PCR Not Detected     Parainfluenza Virus 1 Not Detected     Parainfluenza Virus 2 Not Detected     Parainfluenza Virus 3 Not Detected     Parainfluenza Virus 4 Not Detected     RSV, PCR Not Detected     Bordetella pertussis pcr Not Detected     Bordetella parapertussis PCR Not Detected     Chlamydophila pneumoniae PCR Not Detected     Mycoplasma pneumo by PCR Not Detected    Narrative:      In the setting of a positive respiratory panel with a viral infection PLUS a negative procalcitonin without other underlying concern for bacterial infection, consider observing off antibiotics or discontinuation of antibiotics and continue supportive care. If the respiratory panel is positive for atypical bacterial infection (Bordetella pertussis, Chlamydophila pneumoniae, or Mycoplasma pneumoniae), consider antibiotic de-escalation to target atypical bacterial infection.            No results found for: \"PREGTESTUR\", \"PREGSERUM\", \"HCG\", \"HCGQUANT\"  Pain Management Panel           No data to display                I have personally looked at the labs and they are summarized above.  ----------------------------------------------------------------------------------------------------------------------  Detailed radiology reports for the last 24 hours:    Imaging Results (Last 24 Hours)       Procedure Component Value Units Date/Time    XR Chest 1 View [[id number]] Collected: 07/11/25 2132     Updated: 07/11/25 2136    Narrative:      XR CHEST 1 VW    Date of Exam: 7/11/2025 8:56 PM EDT    Indication: Chest Pain Triage Protocol    Comparison: Chest x-ray 6/12/2023    Findings:  Normal " cardiomediastinal silhouette. The lungs are clear. No pleural effusion or pneumothorax. No acute osseous findings.      Impression:      Impression:  No acute cardiopulmonary findings.        Electronically Signed: Ant Olguin MD    7/11/2025 9:33 PM EDT    Workstation ID: [id number]          Final impressions for the last 30 days of radiology reports:    XR Chest 1 View  Result Date: 7/11/2025  Impression: No acute cardiopulmonary findings. Electronically Signed: Ant Olguin MD  7/11/2025 9:33 PM EDT  Workstation ID: [id number]        Assessment & Plan       This is a 50-year-old male with a past medical history of tobacco use, hypertension, hyperlipidemia, depression, BPH presented with chest pain.     NSTEMI:  - Admit to inpatient   - Trend Troponins x3  - Monitor on Telemetry  - Start Heparin gtt  - F/U Echo  - Nitro as needed  - Coreg 3.125 mg bid, Lipitor 40 mg   - Consult cardiology    Hypertension:  -Currently well controlled  -Hydralazine as needed  -Resume home meds    Depression:      Jey Andrea MD  [facility]  07/11/25  22:53 EDT

## 2025-07-12 NOTE — PROGRESS NOTES
[facility] Medicine Services  PROGRESS NOTE    Patient Name: Wilmer Stover  : 1975  MRN: [id number]    Date of Admission: 2025  Primary Care Physician: Mata Maher MD    Subjective   Subjective     CC:  F/u chest pain    HPI:  Cont to have CP but overall better than presentation      Objective   Objective     Vital Signs:   Temp:  [98.2 °F (36.8 °C)-99.7 °F (37.6 °C)] 98.6 °F (37 °C)  Heart Rate:  [] 87  Resp:  [14-18] 16  BP: ()/(58-99) 123/73  Flow (L/min) (Oxygen Therapy):  [2] 2     Physical Exam:  Constitutional: Awake, alert, laying in bed in NAD  Respiratory: Clear to auscultation bilaterally, respiratory effort normal   Cardiovascular: RRR, systolic murmur  Gastrointestinal: Positive bowel sounds, soft, nontender, nondistended  Musculoskeletal: No LE edema  Psychiatric: Appropriate affect, cooperative  Neurologic: Speech clear and fluent    Results Reviewed:  LAB RESULTS:      Lab 25  194   WBC 10.59  11.19*  --   --  13.27*   HEMOGLOBIN 13.3  13.5  --   --  15.6   HEMATOCRIT 39.4  39.8  --   --  45.9   PLATELETS 262  256  --   --  239   NEUTROS ABS 6.49  6.86  --   --  9.18*   IMMATURE GRANS (ABS) 0.03  0.03  --   --  0.04   LYMPHS ABS 2.10  2.23  --   --  2.07   MONOS ABS 1.48*  1.53*  --   --  1.48*   EOS ABS 0.43*  0.49*  --   --  0.42*   MCV 87.8  87.9  --   --  86.4   PROCALCITONIN  --   --  0.05  --    LACTATE  --   --  0.9  --    PROTIME  --   --  13.5  --    APTT  --   --  32.2*  --    HSTROP T 74* 93* 101*  --          Lab 25  1321 25   SODIUM  --  136 136   POTASSIUM  --  4.1 3.7   CHLORIDE  --  105 102   CO2  --  21.0* 20.7*   ANION GAP  --  10.0 13.3   BUN  --  8.3 8.8   CREATININE  --  0.69* 0.78   EGFR  --  112.7 108.6   GLUCOSE  --  116* 132*   CALCIUM  --  8.7 9.5   MAGNESIUM 2.2  --   --    HEMOGLOBIN A1C  --  5.31  --          Lab  07/11/25 2015   TOTAL PROTEIN 7.8   ALBUMIN 4.3   GLOBULIN 3.5   ALT (SGPT) 16   AST (SGOT) 23   BILIRUBIN 0.8   ALK PHOS 100   LIPASE 25         Lab 07/12/25 0055 07/11/25 2122 07/11/25 2015   PROBNP  --   --  158.0   HSTROP T 74* 93* 101*   PROTIME  --   --  13.5   INR  --   --  0.98         Lab 07/12/25 0055   CHOLESTEROL 220*   LDL CHOL 173*   HDL CHOL 34*   TRIGLYCERIDES 71             Brief Urine Lab Results       None            Microbiology Results Abnormal       None            XR Chest 1 View  Result Date: 7/11/2025  XR CHEST 1 VW Date of Exam: 7/11/2025 8:56 PM EDT Indication: Chest Pain Triage Protocol Comparison: Chest x-ray 6/12/2023 Findings: Normal cardiomediastinal silhouette. The lungs are clear. No pleural effusion or pneumothorax. No acute osseous findings.     Impression: Impression: No acute cardiopulmonary findings. Electronically Signed: Ant Olguin MD  7/11/2025 9:33 PM EDT  Workstation ID: [id number]          Current medications:  Scheduled Meds:amLODIPine, 10 mg, Oral, Daily  aspirin, 81 mg, Oral, Daily  carvedilol, 3.125 mg, Oral, BID With Meals  [START ON 7/13/2025] clopidogrel, 75 mg, Oral, Daily  FLUoxetine, 10 mg, Oral, Daily  losartan, 25 mg, Oral, Q24H  [Transfer Hold] pharmacy consult - MTM, , Not Applicable, Daily  [START ON 7/13/2025] rosuvastatin, 20 mg, Oral, Daily  sodium chloride, 10 mL, Intravenous, Q12H      Continuous Infusions:sodium chloride, 100 mL/hr, Last Rate: 100 mL/hr (07/12/25 1259)      PRN Meds:.  acetaminophen **OR** acetaminophen    senna-docusate sodium **AND** polyethylene glycol **AND** bisacodyl **AND** bisacodyl    [Transfer Hold] Calcium Replacement - Follow Nurse / BPA Driven Protocol    hydrALAZINE    HYDROcodone-acetaminophen    [Transfer Hold] Magnesium Cardiology Dose Replacement - Follow Nurse / BPA Driven Protocol    nitroglycerin    [Transfer Hold] Phosphorus Replacement - Follow Nurse / BPA Driven Protocol    Potassium Replacement -  Follow Nurse / BPA Driven Protocol    sodium chloride    [COMPLETED] Insert Peripheral IV **AND** sodium chloride    sodium chloride    sodium chloride    traZODone    Assessment & Plan   Assessment & Plan     Active Hospital Problems    Diagnosis  POA    **NSTEMI (non-ST elevated myocardial infarction) [I21.4]  Yes    Essential hypertension [I10]  Yes    Coronary artery disease involving native coronary artery of native heart with angina pectoris [I25.119]  Yes    Hyperlipidemia LDL goal <50 [E78.5]  Yes      Resolved Hospital Problems   No resolved problems to display.        Brief Hospital Course to date:  Wilmer Stover is a 50 y.o. male w/ tobacco use, HTN, HLD, COPD, reported [facility] ~2022 w/o intervention, who presented for 3 days of worsening substernal CP radiating to the RT chest; on arrival HS troponin 101 -> 93 -> 74, EKG showing diffuse ST segment changes not following a standard vascular distribution; he was admitted and cardiology was consulted    The following problems are new to me today    Assessment/Plan    NSTEMI  HTN/HLD  -?pericarditis?  -ASA, coreg, statin, amlodipine  -on heparin gtt  -cards evaluating    COPD/Tobacco use - ordered prn duonebs and NRT  Anxiety/Depression - prozac  Hodgkin Lymphoma? - data deficit, listed on PMH    Expected Discharge Location and Transportation: home      VTE Prophylaxis:  Mechanical VTE prophylaxis orders are present.         AM-PAC 6 Clicks Score (PT): 23 (07/11/25 9602)    CODE STATUS:   Code Status and Medical Interventions: CPR (Attempt to Resuscitate); Full Support   Ordered at: 07/11/25 2170     Code Status (Patient has no pulse and is not breathing):    CPR (Attempt to Resuscitate)     Medical Interventions (Patient has pulse or is breathing):    Full Support       Vlad Pacheco, DO  07/12/25

## 2025-07-12 NOTE — CONSULTS
Pharmacy to Dose Heparin Infusion Note    Wilmer Stover is a 50 y.o. male receiving heparin infusion.     Therapy for (VTE/Cardiac): Cardiac  Patient Weight: 86.2 kg  Initial Bolus (Y/N): Yes  Any Bolus (Y/N): Yes     Signs or Symptoms of Bleeding: N/A    Cardiac or Other (Not VTE)   Initial Bolus: 60 units/kg (Max 4,000 units)  Initial rate: 12 units/kg/hr (Max 1,000 units/hr)   Anti-Xa Bolus   Dose Infusion Hold   Time Infusion Rate Change (units/kg/hr) Repeat  Anti-Xa    < 0.11 50 units/kg  (4000 units Max) None Increase by  3 units/kg/hr 6 hours   0.11- 0.19 25 units/kg  (2000 units Max) None Increase by  2 units/kg/hr 6 hours   0.2 - 0.29 0 None Increase by  1 units/kg/hr 6 hours   0.3 - 0.5 0 None No Change 6 hours (after 2 consecutive levels in range check qAM)   0.51 - 0.6 0 None Decrease by  1 units/kg/hr 6 hours   0.61 - 0.8 0 30 minutes Decrease by  2 units/kg/hr 6 hours   0.81 - 1 0 60 minutes Decrease by  3 units/kg/hr 6 hours   >1 0 Hold  After Anti-Xa less than 0.5 decrease previous rate by  4 units/kg/hr  Every 2 hours until Anti-Xa  less than 0.5 then when infusion restarts in 6 hours       Results from last 7 days   Lab Units 07/11/25 2015 07/11/25  1942   INR  0.98  --    HEMOGLOBIN g/dL  --  15.6   HEMATOCRIT %  --  45.9   PLATELETS 10*3/mm3  --  239       Date   Time   Anti-Xa Current Rate (units/kg/hr) Bolus   (units) Rate Change   (units/kg/hr) New Rate (units/kg/hr) Repeat  Anti-Xa Comments /  Pump Check    7/11 2132 0.1 New 4000 +12 12 0400 Dw ED RN. Pump confirmed. Patient counseled                 Brandon Odell IV, PharmD, BCPS  7/11/2025  21:46 EDT

## 2025-07-12 NOTE — PROGRESS NOTES
Pharmacy to Dose Heparin Infusion Note    Wilmer Stover is a 50 y.o. male receiving heparin infusion.     Therapy for (VTE/Cardiac): Cardiac  Patient Weight: 86.2 kg  Initial Bolus (Y/N): Yes  Any Bolus (Y/N): Yes     Signs or Symptoms of Bleeding: N/A    Cardiac or Other (Not VTE)   Initial Bolus: 60 units/kg (Max 4,000 units)  Initial rate: 12 units/kg/hr (Max 1,000 units/hr)   Anti-Xa Bolus   Dose Infusion Hold   Time Infusion Rate Change (units/kg/hr) Repeat  Anti-Xa    < 0.11 50 units/kg  (4000 units Max) None Increase by  3 units/kg/hr 6 hours   0.11- 0.19 25 units/kg  (2000 units Max) None Increase by  2 units/kg/hr 6 hours   0.2 - 0.29 0 None Increase by  1 units/kg/hr 6 hours   0.3 - 0.5 0 None No Change 6 hours (after 2 consecutive levels in range check qAM)   0.51 - 0.6 0 None Decrease by  1 units/kg/hr 6 hours   0.61 - 0.8 0 30 minutes Decrease by  2 units/kg/hr 6 hours   0.81 - 1 0 60 minutes Decrease by  3 units/kg/hr 6 hours   >1 0 Hold  After Anti-Xa less than 0.5 decrease previous rate by  4 units/kg/hr  Every 2 hours until Anti-Xa  less than 0.5 then when infusion restarts in 6 hours       Results from last 7 days   Lab Units 07/12/25  0055 07/11/25 2015 07/11/25  1942   INR   --  0.98  --    HEMOGLOBIN g/dL 13.3  13.5  --  15.6   HEMATOCRIT % 39.4  39.8  --  45.9   PLATELETS 10*3/mm3 262  256  --  239       Date   Time   Anti-Xa Current Rate (units/kg/hr) Bolus   (units) Rate Change   (units/kg/hr) New Rate (units/kg/hr) Repeat  Anti-Xa Comments /  Pump Check    7/11 2132 0.1 New 4000 +12 12 0400 Dw ED RN. Pump confirmed. Patient counseled    7/12 0340 0.1 12 4000 +3 15 1200 Linda [phone] -b   7/12 1321 0.34 15 -- -- 15 2000 Driss 3250                                                                                                                                                                                                         Valdo Cesar [facility]  7/12/2025  14:27 EDT

## 2025-07-12 NOTE — ED NOTES
Wilmer Stover    Nursing Report ED to Floor:  Mental status: A&OX4  Ambulatory status: ADLIB  Oxygen Therapy:  RA  Cardiac Rhythm: NSR  Admitted from: HOME  Safety Concerns:  NA  Precautions: NA  Social Issues: NA  ED Room #:  12    ED Nurse Phone Extension - [phone] or may call [phone].      HPI:   Chief Complaint   Patient presents with    Chest Pain    Shortness of Breath       Past Medical History:  Past Medical History:   Diagnosis Date    Allergic     Anxiety     Arthritis     Asthma     COPD (chronic obstructive pulmonary disease)     Depression     Gall stones     Hepatitis C     Hodgkin's lymphoma     Hyperlipidemia     Hypertension     Kidney stone     Memory loss     Neuropathy     Numbness and tingling     Thyroid disease     Weakness         Past Surgical History:  Past Surgical History:   Procedure Laterality Date    CHOLECYSTECTOMY      CYSTOSCOPY BLADDER STONE LITHOTRIPSY      MASTECTOMY MODIFIED RADICAL W/ AXILLARY LYMPH NODES W/ OR W/O PECTORALIS MINOR          Admitting Doctor:   No admitting provider for patient encounter.    Consulting Provider(s):  Consults       Date and Time Order Name Status Description    7/11/2025 11:00 PM Inpatient Cardiology Consult               Admitting Diagnosis:   There were no encounter diagnoses.    Most Recent Vitals:   Vitals:    07/11/25 2200 07/11/25 2215 07/11/25 2230 07/11/25 2245   BP: 107/73 105/67 104/73 100/69   Pulse: 85 86 84 85   Resp:       Temp:       SpO2: 94% 95% 95% 94%   Weight:       Height:           Active LDAs/IV Access:   Lines, Drains & Airways       Active LDAs       Name Placement date Placement time Site Days    Peripheral IV 07/11/25 2122 18 G Right Antecubital 07/11/25 2122  Antecubital  less than 1                    Labs (abnormal labs have a star):   Labs Reviewed   TROPONIN - Abnormal; Notable for the following components:       Result Value    HS Troponin T 101 (*)     All other components within normal limits    Narrative:      High Sensitive Troponin T Reference Range:  <14.0 ng/L- Negative Female for AMI  <22.0 ng/L- Negative Male for AMI  >=14 - Abnormal Female indicating possible myocardial injury.  >=22 - Abnormal Male indicating possible myocardial injury.   Clinicians would have to utilize clinical acumen, EKG, Troponin, and serial changes to determine if it is an Acute Myocardial Infarction or myocardial injury due to an underlying chronic condition.        COMPREHENSIVE METABOLIC PANEL - Abnormal; Notable for the following components:    Glucose 132 (*)     CO2 20.7 (*)     All other components within normal limits    Narrative:     GFR Categories in Chronic Kidney Disease (CKD)              GFR Category          GFR (mL/min/1.73)    Interpretation  G1                    90 or greater        Normal or high (1)  G2                    60-89                Mild decrease (1)  G3a                   45-59                Mild to moderate decrease  G3b                   30-44                Moderate to severe decrease  G4                    15-29                Severe decrease  G5                    14 or less           Kidney failure    (1)In the absence of evidence of kidney disease, neither GFR category G1 or G2 fulfill the criteria for CKD.    eGFR calculation 2021 CKD-EPI creatinine equation, which does not include race as a factor   CBC WITH AUTO DIFFERENTIAL - Abnormal; Notable for the following components:    WBC 13.27 (*)     RDW 11.9 (*)     Lymphocyte % 15.6 (*)     Neutrophils, Absolute 9.18 (*)     Monocytes, Absolute 1.48 (*)     Eosinophils, Absolute 0.42 (*)     All other components within normal limits    Narrative:     Instrument flags present, additional testing may be recommended.   HIGH SENSITIVITIY TROPONIN T 1HR - Abnormal; Notable for the following components:    HS Troponin T 93 (*)     All other components within normal limits    Narrative:     High Sensitive Troponin T Reference Range:  <14.0 ng/L- Negative  Female for AMI  <22.0 ng/L- Negative Male for AMI  >=14 - Abnormal Female indicating possible myocardial injury.  >=22 - Abnormal Male indicating possible myocardial injury.   Clinicians would have to utilize clinical acumen, EKG, Troponin, and serial changes to determine if it is an Acute Myocardial Infarction or myocardial injury due to an underlying chronic condition.        HEPARIN ANTI XA - Abnormal; Notable for the following components:    Heparin Anti-Xa (UFH) 0.10 (*)     All other components within normal limits   APTT - Abnormal; Notable for the following components:    PTT 32.2 (*)     All other components within normal limits    Narrative:     PTT = The equivalent PTT values for the therapeutic range of heparin levels at 0.3 to 0.5 U/ml are 60 to 70 seconds.   RESPIRATORY PANEL PCR W/ COVID-19 (SARS-COV-2), NP SWAB IN UTM/VTP, 2 HR TAT - Normal    Narrative:     In the setting of a positive respiratory panel with a viral infection PLUS a negative procalcitonin without other underlying concern for bacterial infection, consider observing off antibiotics or discontinuation of antibiotics and continue supportive care. If the respiratory panel is positive for atypical bacterial infection (Bordetella pertussis, Chlamydophila pneumoniae, or Mycoplasma pneumoniae), consider antibiotic de-escalation to target atypical bacterial infection.   LIPASE - Normal   BNP (IN-HOUSE) - Normal    Narrative:     This assay is used as an aid in the diagnosis of individuals suspected of having heart failure. It can be used as an aid in the diagnosis of acute decompensated heart failure (ADHF) in patients presenting with signs and symptoms of ADHF to the emergency department (ED). In addition, NT-proBNP of <300 pg/mL indicates ADHF is not likely.    Age Range Result Interpretation  NT-proBNP Concentration (pg/mL:      <50             Positive            >450                   Gray                 300-450                     "Negative             <300    50-75           Positive            >900                  Gray                300-900                  Negative            <300      >75             Positive            >1800                  Gray                300-1800                  Negative            <300   PROCALCITONIN - Normal    Narrative:     As a Marker for Sepsis (Non-Neonates):    1. <0.5 ng/mL represents a low risk of severe sepsis and/or septic shock.  2. >2 ng/mL represents a high risk of severe sepsis and/or septic shock.    As a Marker for Lower Respiratory Tract Infections that require antibiotic therapy:    PCT on Admission    Antibiotic Therapy       6-12 Hrs later    >0.5                Strongly Recommended  >0.25 - <0.5        Recommended   0.1 - 0.25          Discouraged              Remeasure/reassess PCT  <0.1                Strongly Discouraged     Remeasure/reassess PCT    As 28 day mortality risk marker: \"Change in Procalcitonin Result\" (>80% or <=80%) if Day 0 (or Day 1) and Day 4 values are available. Refer to [email or web address]    Change in PCT <=80%  A decrease of PCT levels below or equal to 80% defines a positive change in PCT test result representing a higher risk for 28-day all-cause mortality of patients diagnosed with severe sepsis for septic shock.    Change in PCT >80%  A decrease of PCT levels of more than 80% defines a negative change in PCT result representing a lower risk for 28-day all-cause mortality of patients diagnosed with severe sepsis or septic shock.      LACTIC ACID, PLASMA - Normal   PROTIME-INR - Normal   COVID PRE-OP / PRE-PROCEDURE SCREENING ORDER (NO ISOLATION)    Narrative:     The following orders were created for panel order COVID PRE-OP / PRE-PROCEDURE SCREENING ORDER (NO ISOLATION) - Swab, Nasopharynx.  Procedure                               Abnormality         Status                     ---------                               -----------         ------ "                     Respiratory Panel PCR w/...[[id number]]  Normal              Final result                 Please view results for these tests on the individual orders.   RAINBOW DRAW    Narrative:     The following orders were created for panel order [place] Draw.  Procedure                               Abnormality         Status                     ---------                               -----------         ------                     Green Top (Gel)[[id number]]                                  Final result               Lavender Top[[id number]]                                     Final result               Gold Top - SST[[id number]]                                   Final result               Moore Top[[id number]]                                         Final result               Light Blue Top[[id number]]                                   Final result                 Please view results for these tests on the individual orders.   SCAN SLIDE   HEPARIN ANTI XA   CBC WITH AUTO DIFFERENTIAL   TROPONIN   CBC AND DIFFERENTIAL    Narrative:     The following orders were created for panel order CBC & Differential.  Procedure                               Abnormality         Status                     ---------                               -----------         ------                     CBC Auto Differential[[id number]]        Abnormal            Final result               Scan Slide[[id number]]                                       Final result                 Please view results for these tests on the individual orders.   GREEN TOP   LAVENDER TOP   GOLD TOP - SST   GRAY TOP   LIGHT BLUE TOP   CBC AND DIFFERENTIAL    Narrative:     The following orders were created for panel order CBC & Differential.  Procedure                               Abnormality         Status                     ---------                               -----------         ------                     CBC Auto Differential[[id number]]                                                          Please view results for these tests on the individual orders.       Meds Given in ED:   Medications   sodium chloride 0.9 % flush 10 mL (has no administration in time range)   sodium chloride 0.9 % flush 10 mL (has no administration in time range)   heparin 00055 units/250 mL (100 units/mL) in 0.45 % NaCl infusion ( Intravenous Canceled Entry 7/11/25 2136)   Pharmacy to Dose Heparin (has no administration in time range)   nitroglycerin (NITROSTAT) SL tablet 0.4 mg (has no administration in time range)   carvedilol (COREG) tablet 3.125 mg (has no administration in time range)   aspirin chewable tablet 81 mg (has no administration in time range)   aspirin chewable tablet 324 mg (243 mg Oral Given 7/11/25 1944)   sodium chloride 0.9 % bolus 1,000 mL (1,000 mL Intravenous New Bag 7/11/25 2125)   morphine injection 2 mg (2 mg Intravenous Given 7/11/25 2125)   ondansetron (ZOFRAN) injection 4 mg (4 mg Intravenous Given 7/11/25 2125)   heparin (porcine) injection 4,000 Units (4,000 Units Intravenous Given 7/11/25 2132)     heparin, 12 Units/kg/hr, Last Rate: 12 Units/kg/hr (07/11/25 2132)  Pharmacy to Dose Heparin,          Last NIH score:                                                          Dysphagia screening results:  Patient Factors Component (Dysphagia:Stroke or Rule-out)  Best Eye Response: 4-->(E4) spontaneous (07/11/25 2137)  Best Motor Response: 6-->(M6) obeys commands (07/11/25 2137)  Best Verbal Response: 5-->(V5) oriented (07/11/25 2137)  [place] Coma Scale Score: 15 (07/11/25 2137)     [place] Coma Scale:  No data recorded     CIWA:        Restraint Type:            Isolation Status:  No active isolations

## 2025-07-12 NOTE — PROGRESS NOTES
[facility] at [facility]  Cardiovascular Consultation Note    Reason for consult: NSTEMI           50-year-old gentleman with a history of nonobstructive CAD on previous C presented to [facility] with 2 months of intermittent chest discomfort which worsened over the last day.  He states that the chest discomfort feels like pressure and radiates to his jaw.  He also states that he has chest pain that is worse when he takes deep inspiration.    EKG suggestive of pericarditis.  Troponin in ER elevated    Cardiac risk factors: Male gender, hypertension    Past medical and surgical history, social and family history reviewed in EMR.    REVIEW OF SYSTEMS:   H&P ROS reviewed and pertinent CV ROS as noted in HPI.         Vital Sign Min/Max for last 24 hours  Temp  Min: 98.2 °F (36.8 °C)  Max: 99.7 °F (37.6 °C)   BP  Min: 99/73  Max: 145/99   Pulse  Min: 82  Max: 120   Resp  Min: 15  Max: 18   SpO2  Min: 92 %  Max: 98 %   No data recorded      Intake/Output Summary (Last 24 hours) at 7/12/2025 1025  Last data filed at 7/12/2025 1000  Gross per 24 hour   Intake 360 ml   Output --   Net 360 ml           Constitutional:       Appearance: Healthy appearance. Well-developed.   Eyes:      General: Lids are normal. No scleral icterus.     Conjunctiva/sclera: Conjunctivae normal.   HENT:      Head: Normocephalic and atraumatic.   Neck:      Thyroid: No thyromegaly.      Vascular: No carotid bruit or JVD.   Pulmonary:      Effort: Pulmonary effort is normal.      Breath sounds: Normal breath sounds. No wheezing. No rhonchi. No rales.   Cardiovascular:      Normal rate. Regular rhythm.      Murmurs: There is no murmur.      No gallop.  No rub.   Pulses:     Intact distal pulses.   Edema:     Peripheral edema absent.   Abdominal:      General: There is no distension.      Palpations: Abdomen is soft. There is no abdominal mass.   Musculoskeletal:      Cervical back: Normal range of motion. Skin:      General: Skin is warm and dry.      Findings: No rash.   Neurological:      General: No focal deficit present.      Mental Status: Alert and oriented to person, place, and time.      Gait: Gait is intact.   Psychiatric:         Attention and Perception: Attention normal.         Mood and Affect: Mood normal.         Behavior: Behavior normal.          EKG (7/12/2024): Normal sinus rhythm,.  Diffuse ST elevation and LA elevation in aVR suggestive of pericarditis        Lab Review:   Labs reviewed in the electronic medical record.  Pertinent findings include:  Lab Results   Component Value Date    GLUCOSE 116 (H) 07/12/2025    BUN 8.3 07/12/2025    CREATININE 0.69 (L) 07/12/2025     07/12/2025    K 4.1 07/12/2025     07/12/2025    CALCIUM 8.7 07/12/2025    PROTEINTOT 7.8 07/11/2025    ALBUMIN 4.3 07/11/2025    ALT 16 07/11/2025    AST 23 07/11/2025    ALKPHOS 100 07/11/2025    BILITOT 0.8 07/11/2025    GLOB 3.5 07/11/2025    AGRATIO 1.2 07/11/2025    BCR 12.0 07/12/2025    ANIONGAP 10.0 07/12/2025    EGFR 112.7 07/12/2025     Lab Results   Component Value Date    WBC 11.19 (H) 07/12/2025    WBC 10.59 07/12/2025    HGB 13.5 07/12/2025    HGB 13.3 07/12/2025    HCT 39.8 07/12/2025    HCT 39.4 07/12/2025    MCV 87.9 07/12/2025    MCV 87.8 07/12/2025     07/12/2025     07/12/2025     Lab Results   Component Value Date    CHOL 303 (H) 10/31/2024    TRIG 147 10/31/2024    HDL 31 (L) 10/31/2024     (H) 10/31/2024     Lab Results   Component Value Date    TROPONINT 74 (C) 07/12/2025    TROPONINT 93 (C) 07/11/2025    TROPONINT 101 (C) 07/11/2025              NSTEMI  Chest pain symptoms with typical and atypical features  Elevated troponin, presently downtrending,  Recommend cardiac catheterization to evaluate whether related to type I MI versus pericarditis    Hypertension  Presently controlled      Hyperlipidemia with Target LDL <50  Repeat lipids  Previous LDL severely elevated       Cardiac  catheterization with possible PCI today  N.p.o. except meds and sips and ice chips      H. C. Hugo Lama MD [facility], [facility]  Interventional and General Cardiology

## 2025-07-12 NOTE — CONSULTS
Inpatient Cardiology Consult  Consult performed by: Slick Lama IV, MD  Consult ordered by: Jey Andrea MD  Reason for consult: Chest pain          [facility]  Cardiovascular Consultation Note     Active Hospital Problems    Diagnosis  POA    **NSTEMI (non-ST elevated myocardial infarction) [I21.4]  Yes    Essential hypertension [I10]  Yes     Target blood pressure <130/80 mmHg      Coronary artery disease involving native coronary artery of native heart with angina pectoris [I25.119]  Yes     Reported nonobstructive CAD on previous heart catheterization      Hyperlipidemia LDL goal <50 [E78.5]  Yes    Hepatitis C [B19.20]  Yes       Subjective  50-year-old gentleman with a history of nonobstructive CAD on previous [facility] presented to [facility] with 2 months of intermittent chest discomfort which worsened over the last day.  He states that the chest discomfort feels like pressure and radiates to his jaw.  He also states that he has chest pain that is worse when he takes deep inspiration.     EKG suggestive of pericarditis.  Troponin in ER elevated     Cardiac risk factors: Male gender, hypertension     Past medical and surgical history, social and family history reviewed in EMR.     REVIEW OF SYSTEMS:   H&P ROS reviewed and pertinent CV ROS as noted in HPI.     Objective  Vital Sign Min/Max for last 24 hours  Temp  Min: 98.2 °F (36.8 °C)  Max: 99.7 °F (37.6 °C)   BP  Min: 99/73  Max: 145/99   Pulse  Min: 82  Max: 120   Resp  Min: 15  Max: 18   SpO2  Min: 92 %  Max: 98 %   No data recorded       Intake/Output Summary (Last 24 hours) at 7/12/2025 1025  Last data filed at 7/12/2025 1000      Gross per 24 hour   Intake 360 ml   Output --   Net 360 ml            Physical Exam  HENT:      Head: Normocephalic and atraumatic.   Eyes:      General: No scleral icterus.  Neck:      Thyroid: No thyromegaly.      Vascular: No carotid bruit or JVD.   Cardiovascular:       Rate and Rhythm: Normal rate.      Pulses:           Radial pulses are 2+ on the right side and 2+ on the left side.      Heart sounds: Normal heart sounds. No murmur heard.     No gallop.   Pulmonary:      Effort: Pulmonary effort is normal.      Breath sounds: Normal breath sounds. No wheezing or rales.   Abdominal:      General: There is no distension.      Palpations: Abdomen is soft.      Tenderness: There is no abdominal tenderness.   Musculoskeletal:      Cervical back: Neck supple.   Skin:     General: Skin is warm and dry.   Neurological:      Mental Status: He is alert and oriented to person, place, and time.   Psychiatric:         Mood and Affect: Mood and affect normal.       al.         Behavior: Behavior normal.            EKG (7/12/2024): Normal sinus rhythm,.  Diffuse ST elevation and NH elevation in aVR suggestive of pericarditis          Lab Review:   Labs reviewed in the electronic medical record.  Pertinent findings include:        Lab Results   Component Value Date     GLUCOSE 116 (H) 07/12/2025     BUN 8.3 07/12/2025     CREATININE 0.69 (L) 07/12/2025      07/12/2025     K 4.1 07/12/2025      07/12/2025     CALCIUM 8.7 07/12/2025     PROTEINTOT 7.8 07/11/2025     ALBUMIN 4.3 07/11/2025     ALT 16 07/11/2025     AST 23 07/11/2025     ALKPHOS 100 07/11/2025     BILITOT 0.8 07/11/2025     GLOB 3.5 07/11/2025     AGRATIO 1.2 07/11/2025     BCR 12.0 07/12/2025     ANIONGAP 10.0 07/12/2025     EGFR 112.7 07/12/2025            Lab Results   Component Value Date     WBC 11.19 (H) 07/12/2025     WBC 10.59 07/12/2025     HGB 13.5 07/12/2025     HGB 13.3 07/12/2025     HCT 39.8 07/12/2025     HCT 39.4 07/12/2025     MCV 87.9 07/12/2025     MCV 87.8 07/12/2025      07/12/2025      07/12/2025            Lab Results   Component Value Date     CHOL 303 (H) 10/31/2024     TRIG 147 10/31/2024     HDL 31 (L) 10/31/2024      (H) 10/31/2024            Lab Results   Component Value  Date     TROPONINT 74 (C) 07/12/2025     TROPONINT 93 (C) 07/11/2025     TROPONINT 101 (C) 07/11/2025               Assessment  NSTEMI  Chest pain symptoms with typical and atypical features  Elevated troponin, presently downtrending,  Recommend cardiac catheterization to evaluate whether related to type I MI versus pericarditis     Hypertension  Presently controlled        Hyperlipidemia with Target LDL <50  Repeat lipids  Previous LDL severely elevated     Plan  Echo  Cardiac catheterization with possible PCI today  N.p.o. except meds and sips and ice chips        H. C. Hugo Lama MD FAC, [facility]  Interventional and General Cardiology

## 2025-07-12 NOTE — PLAN OF CARE
Goal Outcome Evaluation:  Plan of Care Reviewed With: patient        Progress: improving  Outcome Evaluation: Patient A+Ox4, has denied chest pain or SOA on room air.  VSS, NSR via the monitor.  Pt was made NPO after BKF r/t heart cath today.  Has been independent in his room.  Continued on Heparin gtt till he left for a heart cath. Will continue with current POC.

## 2025-07-13 ENCOUNTER — APPOINTMENT (OUTPATIENT)
Dept: CARDIOLOGY | Facility: HOSPITAL | Age: 50
DRG: 322 | End: 2025-07-13

## 2025-07-13 VITALS
OXYGEN SATURATION: 94 % | HEART RATE: 92 BPM | DIASTOLIC BLOOD PRESSURE: 79 MMHG | RESPIRATION RATE: 18 BRPM | SYSTOLIC BLOOD PRESSURE: 113 MMHG | WEIGHT: 190.04 LBS | HEIGHT: 67 IN | BODY MASS INDEX: 29.83 KG/M2 | TEMPERATURE: 98 F

## 2025-07-13 PROBLEM — I35.1 NONRHEUMATIC AORTIC VALVE INSUFFICIENCY: Status: ACTIVE | Noted: 2025-07-13

## 2025-07-13 PROBLEM — I30.9 ACUTE PERICARDITIS: Status: ACTIVE | Noted: 2025-07-13

## 2025-07-13 LAB
ANION GAP SERPL CALCULATED.3IONS-SCNC: 12 MMOL/L (ref 5–15)
AORTIC DIMENSIONLESS INDEX: 1.1 (DI)
AV MEAN PRESS GRAD SYS DOP V1V2: 11 MMHG
AV VMAX SYS DOP: 210 CM/SEC
BASOPHILS # BLD AUTO: 0.06 10*3/MM3 (ref 0–0.2)
BASOPHILS NFR BLD AUTO: 0.6 % (ref 0–1.5)
BH CV ECHO MEAS - AI P1/2T: 408 MSEC
BH CV ECHO MEAS - AO MAX PG: 17.6 MMHG
BH CV ECHO MEAS - AO ROOT DIAM: 2.7 CM
BH CV ECHO MEAS - AO V2 VTI: 35.1 CM
BH CV ECHO MEAS - AVA(I,D): 3.8 CM2
BH CV ECHO MEAS - EDV(CUBED): 74.1 ML
BH CV ECHO MEAS - EDV(MOD-SP2): 99.7 ML
BH CV ECHO MEAS - EDV(MOD-SP4): 112 ML
BH CV ECHO MEAS - EF(MOD-SP2): 56.7 %
BH CV ECHO MEAS - EF(MOD-SP4): 55.7 %
BH CV ECHO MEAS - ESV(CUBED): 19.7 ML
BH CV ECHO MEAS - ESV(MOD-SP2): 43.2 ML
BH CV ECHO MEAS - ESV(MOD-SP4): 49.6 ML
BH CV ECHO MEAS - FS: 35.7 %
BH CV ECHO MEAS - IVS/LVPW: 1 CM
BH CV ECHO MEAS - IVSD: 1.1 CM
BH CV ECHO MEAS - LA DIMENSION: 3.5 CM
BH CV ECHO MEAS - LAT PEAK E' VEL: 9.8 CM/SEC
BH CV ECHO MEAS - LV DIASTOLIC VOL/BSA (35-75): 56.6 CM2
BH CV ECHO MEAS - LV MASS(C)D: 157.1 GRAMS
BH CV ECHO MEAS - LV MAX PG: 17.6 MMHG
BH CV ECHO MEAS - LV MEAN PG: 10 MMHG
BH CV ECHO MEAS - LV SYSTOLIC VOL/BSA (12-30): 25.1 CM2
BH CV ECHO MEAS - LV V1 MAX: 210 CM/SEC
BH CV ECHO MEAS - LV V1 VTI: 38.6 CM
BH CV ECHO MEAS - LVIDD: 4.2 CM
BH CV ECHO MEAS - LVIDS: 2.7 CM
BH CV ECHO MEAS - LVOT AREA: 3.5 CM2
BH CV ECHO MEAS - LVOT DIAM: 2.1 CM
BH CV ECHO MEAS - LVPWD: 1.1 CM
BH CV ECHO MEAS - MED PEAK E' VEL: 8.9 CM/SEC
BH CV ECHO MEAS - MR MAX PG: 125.4 MMHG
BH CV ECHO MEAS - MR MAX VEL: 560 CM/SEC
BH CV ECHO MEAS - MR MEAN PG: 83 MMHG
BH CV ECHO MEAS - MR MEAN VEL: 420 CM/SEC
BH CV ECHO MEAS - MR VTI: 110 CM
BH CV ECHO MEAS - MV A MAX VEL: 86.2 CM/SEC
BH CV ECHO MEAS - MV DEC SLOPE: 787 CM/SEC2
BH CV ECHO MEAS - MV DEC TIME: 0.11 SEC
BH CV ECHO MEAS - MV E MAX VEL: 82.3 CM/SEC
BH CV ECHO MEAS - MV E/A: 0.95
BH CV ECHO MEAS - MV MAX PG: 3.6 MMHG
BH CV ECHO MEAS - MV MEAN PG: 2 MMHG
BH CV ECHO MEAS - MV V2 VTI: 22 CM
BH CV ECHO MEAS - MVA(VTI): 6.1 CM2
BH CV ECHO MEAS - PA V2 MAX: 148 CM/SEC
BH CV ECHO MEAS - RV MAX PG: 7.1 MMHG
BH CV ECHO MEAS - RV V1 MAX: 133 CM/SEC
BH CV ECHO MEAS - RV V1 VTI: 27.6 CM
BH CV ECHO MEAS - SV(LVOT): 133.7 ML
BH CV ECHO MEAS - SV(MOD-SP2): 56.5 ML
BH CV ECHO MEAS - SV(MOD-SP4): 62.4 ML
BH CV ECHO MEAS - SVI(LVOT): 67.6 ML/M2
BH CV ECHO MEAS - SVI(MOD-SP2): 28.6 ML/M2
BH CV ECHO MEAS - SVI(MOD-SP4): 31.5 ML/M2
BH CV ECHO MEAS - TAPSE (>1.6): 2.42 CM
BH CV ECHO MEASUREMENTS AVERAGE E/E' RATIO: 8.8
BH CV XLRA - RV BASE: 3.4 CM
BH CV XLRA - RV LENGTH: 8.6 CM
BH CV XLRA - RV MID: 2.6 CM
BH CV XLRA - TDI S': 16.5 CM/SEC
BUN SERPL-MCNC: 6.7 MG/DL (ref 6–20)
BUN/CREAT SERPL: 9.9 (ref 7–25)
CALCIUM SPEC-SCNC: 8.7 MG/DL (ref 8.6–10.5)
CHLORIDE SERPL-SCNC: 105 MMOL/L (ref 98–107)
CO2 SERPL-SCNC: 20 MMOL/L (ref 22–29)
CREAT SERPL-MCNC: 0.68 MG/DL (ref 0.76–1.27)
CRP SERPL-MCNC: 16.2 MG/DL (ref 0–0.5)
DEPRECATED RDW RBC AUTO: 38.3 FL (ref 37–54)
EGFRCR SERPLBLD CKD-EPI 2021: 113.2 ML/MIN/1.73
EOSINOPHIL # BLD AUTO: 0.49 10*3/MM3 (ref 0–0.4)
EOSINOPHIL NFR BLD AUTO: 4.6 % (ref 0.3–6.2)
ERYTHROCYTE [DISTWIDTH] IN BLOOD BY AUTOMATED COUNT: 11.8 % (ref 12.3–15.4)
GLUCOSE SERPL-MCNC: 75 MG/DL (ref 65–99)
HCT VFR BLD AUTO: 38.8 % (ref 37.5–51)
HGB BLD-MCNC: 13.1 G/DL (ref 13–17.7)
IMM GRANULOCYTES # BLD AUTO: 0.04 10*3/MM3 (ref 0–0.05)
IMM GRANULOCYTES NFR BLD AUTO: 0.4 % (ref 0–0.5)
IVRT: 53 MS
LEFT ATRIUM VOLUME INDEX: 23.8 ML/M2
LV EF 2D ECHO EST: 55 %
LV EF BIPLANE MOD: 55.5 %
LYMPHOCYTES # BLD AUTO: 1.53 10*3/MM3 (ref 0.7–3.1)
LYMPHOCYTES NFR BLD AUTO: 14.4 % (ref 19.6–45.3)
MAGNESIUM SERPL-MCNC: 2.1 MG/DL (ref 1.6–2.6)
MCH RBC QN AUTO: 29.6 PG (ref 26.6–33)
MCHC RBC AUTO-ENTMCNC: 33.8 G/DL (ref 31.5–35.7)
MCV RBC AUTO: 87.8 FL (ref 79–97)
MONOCYTES # BLD AUTO: 1.3 10*3/MM3 (ref 0.1–0.9)
MONOCYTES NFR BLD AUTO: 12.3 % (ref 5–12)
NEUTROPHILS NFR BLD AUTO: 67.7 % (ref 42.7–76)
NEUTROPHILS NFR BLD AUTO: 7.18 10*3/MM3 (ref 1.7–7)
NRBC BLD AUTO-RTO: 0 /100 WBC (ref 0–0.2)
PLATELET # BLD AUTO: 255 10*3/MM3 (ref 140–450)
PMV BLD AUTO: 9.7 FL (ref 6–12)
POTASSIUM SERPL-SCNC: 3.9 MMOL/L (ref 3.5–5.2)
POTASSIUM SERPL-SCNC: 3.9 MMOL/L (ref 3.5–5.2)
RBC # BLD AUTO: 4.42 10*6/MM3 (ref 4.14–5.8)
SODIUM SERPL-SCNC: 137 MMOL/L (ref 136–145)
WBC NRBC COR # BLD AUTO: 10.6 10*3/MM3 (ref 3.4–10.8)

## 2025-07-13 PROCEDURE — 93306 TTE W/DOPPLER COMPLETE: CPT

## 2025-07-13 PROCEDURE — 86140 C-REACTIVE PROTEIN: CPT | Performed by: INTERNAL MEDICINE

## 2025-07-13 PROCEDURE — 83735 ASSAY OF MAGNESIUM: CPT | Performed by: INTERNAL MEDICINE

## 2025-07-13 PROCEDURE — 84132 ASSAY OF SERUM POTASSIUM: CPT | Performed by: INTERNAL MEDICINE

## 2025-07-13 PROCEDURE — 99232 SBSQ HOSP IP/OBS MODERATE 35: CPT | Performed by: INTERNAL MEDICINE

## 2025-07-13 PROCEDURE — 93306 TTE W/DOPPLER COMPLETE: CPT | Performed by: INTERNAL MEDICINE

## 2025-07-13 PROCEDURE — 80048 BASIC METABOLIC PNL TOTAL CA: CPT | Performed by: INTERNAL MEDICINE

## 2025-07-13 PROCEDURE — 85025 COMPLETE CBC W/AUTO DIFF WBC: CPT | Performed by: INTERNAL MEDICINE

## 2025-07-13 PROCEDURE — 99239 HOSP IP/OBS DSCHRG MGMT >30: CPT

## 2025-07-13 RX ORDER — ROSUVASTATIN CALCIUM 20 MG/1
20 TABLET, COATED ORAL DAILY
Qty: 30 TABLET | Refills: 3 | Status: ON HOLD | OUTPATIENT
Start: 2025-07-13

## 2025-07-13 RX ORDER — LOSARTAN POTASSIUM 25 MG/1
25 TABLET ORAL
Qty: 30 TABLET | Refills: 11 | Status: ON HOLD | OUTPATIENT
Start: 2025-07-13

## 2025-07-13 RX ORDER — COLCHICINE 0.6 MG/1
0.6 TABLET ORAL DAILY
Qty: 30 TABLET | Refills: 3 | Status: ON HOLD | OUTPATIENT
Start: 2025-07-13

## 2025-07-13 RX ORDER — NITROGLYCERIN 0.4 MG/1
0.4 TABLET SUBLINGUAL
Qty: 25 TABLET | Refills: 5 | Status: ON HOLD | OUTPATIENT
Start: 2025-07-13

## 2025-07-13 RX ORDER — POTASSIUM CHLORIDE 1500 MG/1
20 TABLET, EXTENDED RELEASE ORAL ONCE
Status: COMPLETED | OUTPATIENT
Start: 2025-07-13 | End: 2025-07-13

## 2025-07-13 RX ORDER — CARVEDILOL 3.12 MG/1
3.12 TABLET ORAL 2 TIMES DAILY WITH MEALS
Qty: 60 TABLET | Refills: 3 | Status: ON HOLD | OUTPATIENT
Start: 2025-07-13

## 2025-07-13 RX ORDER — COLCHICINE 0.6 MG/1
0.6 TABLET ORAL DAILY
Status: DISCONTINUED | OUTPATIENT
Start: 2025-07-13 | End: 2025-07-13 | Stop reason: HOSPADM

## 2025-07-13 RX ORDER — FAMOTIDINE 20 MG/1
20 TABLET, FILM COATED ORAL 2 TIMES DAILY
Qty: 60 TABLET | Refills: 3 | Status: ON HOLD | OUTPATIENT
Start: 2025-07-13

## 2025-07-13 RX ORDER — CLOPIDOGREL BISULFATE 75 MG/1
75 TABLET ORAL DAILY
Qty: 30 TABLET | Refills: 11 | Status: ON HOLD | OUTPATIENT
Start: 2025-07-13

## 2025-07-13 RX ORDER — ASPIRIN 81 MG/1
81 TABLET, CHEWABLE ORAL DAILY
Qty: 30 TABLET | Refills: 11 | Status: ON HOLD | OUTPATIENT
Start: 2025-07-13

## 2025-07-13 RX ADMIN — POTASSIUM CHLORIDE 20 MEQ: 1500 TABLET, EXTENDED RELEASE ORAL at 10:34

## 2025-07-13 RX ADMIN — Medication 10 ML: at 10:35

## 2025-07-13 RX ADMIN — LOSARTAN POTASSIUM 25 MG: 25 TABLET, FILM COATED ORAL at 10:34

## 2025-07-13 RX ADMIN — ROSUVASTATIN 20 MG: 20 TABLET, FILM COATED ORAL at 10:34

## 2025-07-13 RX ADMIN — ASPIRIN 81 MG: 81 TABLET, CHEWABLE ORAL at 10:34

## 2025-07-13 RX ADMIN — AMLODIPINE BESYLATE 10 MG: 10 TABLET ORAL at 10:34

## 2025-07-13 RX ADMIN — CLOPIDOGREL BISULFATE 75 MG: 75 TABLET, FILM COATED ORAL at 10:34

## 2025-07-13 RX ADMIN — COLCHICINE 0.6 MG: 0.6 TABLET, FILM COATED ORAL at 13:11

## 2025-07-13 RX ADMIN — FLUOXETINE HYDROCHLORIDE 10 MG: 10 CAPSULE ORAL at 10:34

## 2025-07-13 RX ADMIN — CARVEDILOL 3.12 MG: 3.12 TABLET, FILM COATED ORAL at 10:34

## 2025-07-13 NOTE — PROGRESS NOTES
Cardiology Progress Note      Reason for visit:    NSTEMI  Hypertension    IDENTIFICATION: 50-year-old gentleman who resides in [place], [place]    Active Hospital Problems    Diagnosis  POA    **NSTEMI (non-ST elevated myocardial infarction) [I21.4]  Yes     Priority: High     Cardiac catheterization for NSTEMI (7/12/2025): Severe 2-vessel CAD (proximal RCA, OM1).  Proximal RCA culprit for NSTEMI status post 4 x 23 mm LETICIA.  Recommend medical therapy for OM1 disease and as well as moderate diffuse multivessel disease.  Mildly elevated LV filling pressure (LVEDP 17 mmHg)  Echo (7/13/2025): Normal LVEF.  Mild to moderate pericardial effusion with no tamponade.  Moderate aortic regurgitation present.      Acute pericarditis [I30.9]  Yes     Priority: High     [facility] admission for chest pain, NSTEMI, and EKG evidence of pericarditis, 7/11/2025  Echo (7/13/2025): Normal LVEF.  Mild to moderate pericardial effusion with no tamponade.  Moderate aortic regurgitation present.      Nonrheumatic aortic valve insufficiency [I35.1]  Yes     Priority: Medium     Echo (7/13/2025): Normal LVEF.  Mild to moderate pericardial effusion with no tamponade.  Moderate aortic regurgitation present.      Essential hypertension [I10]  Yes     Target blood pressure <130/80 mmHg      Coronary artery disease involving native coronary artery of native heart with angina pectoris [I25.119]  Yes     Cardiac catheterization for NSTEMI (7/12/2025): Severe 2-vessel CAD (proximal RCA, OM1).  Proximal RCA culprit for NSTEMI status post 4 x 23 mm LETICIA.  Recommend medical therapy for OM1 disease and as well as moderate diffuse multivessel disease.  Mildly elevated LV filling pressure (LVEDP 17 mmHg)      Hyperlipidemia LDL goal <50 [E78.5]  Yes            Patient feels better, but has some residual chest discomfort  Echo performed this morning shows pericardial effusion, normal LVEF, moderate aortic regurgitation           Vital Sign Min/Max for  last 24 hours  Temp  Min: 98 °F (36.7 °C)  Max: 99.4 °F (37.4 °C)   BP  Min: 102/65  Max: 142/85   Pulse  Min: 79  Max: 96   Resp  Min: 14  Max: 18   SpO2  Min: 94 %  Max: 97 %   Flow (L/min) (Oxygen Therapy)  Min: 2  Max: 2      Intake/Output Summary (Last 24 hours) at 7/13/2025 1140  Last data filed at 7/12/2025 1819  Gross per 24 hour   Intake 1760 ml   Output 575 ml   Net 1185 ml           Physical Exam  HENT:      Head: Normocephalic.   Cardiovascular:      Rate and Rhythm: Normal rate and regular rhythm.      Heart sounds: No murmur heard.  Pulmonary:      Effort: Pulmonary effort is normal.   Neurological:      Mental Status: He is alert.         Tele: Normal sinus rhythm    Results Review (reviewed the patient's recent labs in the electronic medical record):     EKG (7/12/2025): Normal sinus rhythm, diffuse ST elevation    CXR (7/11/2025): No acute findings    Echo (7/13/2025): LVEF 55%.  Moderate aortic regurgitation.  Moderate pericardial effusion without tamponade.    Results from last 7 days   Lab Units 07/13/25  0357 07/12/25  1321 07/12/25 0055 07/11/25 2015   SODIUM mmol/L 137  --  136 136   POTASSIUM mmol/L 3.9  --  4.1 3.7   CHLORIDE mmol/L 105  --  105 102   BUN mg/dL 6.7  --  8.3 8.8   CREATININE mg/dL 0.68*  --  0.69* 0.78   MAGNESIUM mg/dL 2.1 2.2  --   --        Results from last 7 days   Lab Units 07/12/25 0055 07/11/25 2122 07/11/25 2015   HSTROP T ng/L 74* 93* 101*       Results from last 7 days   Lab Units 07/13/25  0357 07/12/25 0055 07/11/25  1942   WBC 10*3/mm3 10.60 10.59  11.19* 13.27*   HEMOGLOBIN g/dL 13.1 13.3  13.5 15.6   HEMATOCRIT % 38.8 39.4  39.8 45.9   PLATELETS 10*3/mm3 255 262  256 239       Lab Results   Component Value Date    HGBA1C 5.31 07/12/2025       Lab Results   Component Value Date    CHOL 220 (H) 07/12/2025    TRIG 71 07/12/2025    HDL 34 (L) 07/12/2025     (H) 07/12/2025                  Type I NSTEMI  Status post cath with severe two-vessel  disease of proximal RCA and OM1.  Culprit for NSTEMI was proximal RCA treated with LETICIA.  Medical management for OM  DAPT (aspirin 81 mg + clopidogrel 75 mg daily) until 7/2026    Pericarditis  EKG on presentation suggestive of pericarditis  Echo shows pericardial effusion without tamponade  Start colchicine 0.6 mg daily  Repeat limited echo in 1 month    Hypertension  Controlled    Hyperlipidemia  Increase rosuvastatin to 20 mg daily             Okay for discharge  Limited echo and follow-up in cardiology clinic in 4 weeks    H. C. Hugo Lama MD [facility], [facility]  Interventional and General Cardiology

## 2025-07-13 NOTE — DISCHARGE SUMMARY
[facility] Medicine Services  DISCHARGE SUMMARY    Patient Name: Wilmer Stover  : 1975  MRN: [id number]    Date of Admission: 2025  8:57 PM  Date of Discharge:  2025  Primary Care Physician: Mata Maher MD    Consults       Date and Time Order Name Status Description    2025 11:00 PM Inpatient Cardiology Consult Completed             Hospital Course     Presenting Problem: Chest pain    Active Hospital Problems    Diagnosis  POA    **NSTEMI (non-ST elevated myocardial infarction) [I21.4]  Yes    Acute pericarditis [I30.9]  Yes    Nonrheumatic aortic valve insufficiency [I35.1]  Yes    Essential hypertension [I10]  Yes    Coronary artery disease involving native coronary artery of native heart with angina pectoris [I25.119]  Yes    Hyperlipidemia LDL goal <50 [E78.5]  Yes      Resolved Hospital Problems   No resolved problems to display.      Hospital Course:  Wilmer Stover is a 50 y.o. male w/ tobacco use, HTN, HLD, COPD, reported [facility] ~ w/o intervention, who presented for 3 days of worsening substernal CP radiating to the RT chest; on arrival HS troponin 101 -> 93 -> 74, EKG showing diffuse ST segment changes not following a standard vascular distribution; he was admitted and Cardiology was consulted.     NSTEMI  Pericarditis, pericardial effusion  HTN/HLD  - Cardiology followed: s/p [facility] with 1 drug-eluting stent placed in proximal RCA  - Echo 25: LVEF 55%, grade 1 diastolic dysfunction, moderate AR, 1-2 cm pericardial effusion without tamponade  - ASA 81 and Plavix until 2026 per Cardiology  - BB and ARB started per Cardiology  - Target LDL < 50, statin increased  - Colchicine daily for pericardial effusion per Cardiology  - Follow up with PCP in 1 week  - Follow up with Cardiology in 4 weeks for limited echo     COPD/Tobacco use  Anxiety/Depression - Prozac  Hodgkin Lymphoma? - Data deficit, listed on PMH    Discharge Follow Up  Recommendations for outpatient labs/diagnostics:  - Follow up with PCP in 1 week  - Follow up with Cardiology in 4 weeks for limited echo    Day of Discharge     HPI:   Patient seen and examined this morning. No visitors at bedside. Patient reports intermittent chest discomfort. Denies shortness of breath. Recommended follow up reviewed with patient, he expressed understanding.     Review of Systems   Constitutional:  Negative for chills and fever.   Respiratory:  Negative for shortness of breath.    Cardiovascular:  Negative for chest pain.   Gastrointestinal:  Negative for abdominal pain, nausea and vomiting.     Vital Signs:   Temp:  [98 °F (36.7 °C)-99.4 °F (37.4 °C)] 98 °F (36.7 °C)  Heart Rate:  [79-96] 92  Resp:  [14-18] 18  BP: (102-142)/(65-86) 113/79  Flow (L/min) (Oxygen Therapy):  [2] 2      Physical Exam:  Constitutional: Chronically ill appearing male lying in bed in NAD  Respiratory: Clear to auscultation bilaterally, nonlabored respirations   Cardiovascular: RRR, no murmurs  Gastrointestinal: Soft, nontender, nondistended  Musculoskeletal: Muscle tone consistent throughout  Psychiatric: Somewhat flat affect, cooperative  Neurologic: Awake, PEARCE spontaneously, speech clear  Skin: No rashes on exposed skin, bandage applied to right wrist    Pertinent  and/or Most Recent Results     LAB RESULTS:      Lab 07/13/25  0357 07/12/25  0055 07/11/25 2015 07/11/25 1942   WBC 10.60 10.59  11.19*  --  13.27*   HEMOGLOBIN 13.1 13.3  13.5  --  15.6   HEMATOCRIT 38.8 39.4  39.8  --  45.9   PLATELETS 255 262  256  --  239   NEUTROS ABS 7.18* 6.49  6.86  --  9.18*   IMMATURE GRANS (ABS) 0.04 0.03  0.03  --  0.04   LYMPHS ABS 1.53 2.10  2.23  --  2.07   MONOS ABS 1.30* 1.48*  1.53*  --  1.48*   EOS ABS 0.49* 0.43*  0.49*  --  0.42*   MCV 87.8 87.8  87.9  --  86.4   PROCALCITONIN  --   --  0.05  --    LACTATE  --   --  0.9  --    PROTIME  --   --  13.5  --    APTT  --   --  32.2*  --          Lab  07/13/25  0357 07/12/25  1321 07/12/25  0055 07/11/25 2015   SODIUM 137  --  136 136   POTASSIUM 3.9  --  4.1 3.7   CHLORIDE 105  --  105 102   CO2 20.0*  --  21.0* 20.7*   ANION GAP 12.0  --  10.0 13.3   BUN 6.7  --  8.3 8.8   CREATININE 0.68*  --  0.69* 0.78   EGFR 113.2  --  112.7 108.6   GLUCOSE 75  --  116* 132*   CALCIUM 8.7  --  8.7 9.5   MAGNESIUM 2.1 2.2  --   --    HEMOGLOBIN A1C  --   --  5.31  --          Lab 07/11/25 2015   TOTAL PROTEIN 7.8   ALBUMIN 4.3   GLOBULIN 3.5   ALT (SGPT) 16   AST (SGOT) 23   BILIRUBIN 0.8   ALK PHOS 100   LIPASE 25         Lab 07/12/25  0055 07/11/25 2122 07/11/25 2015   PROBNP  --   --  158.0   HSTROP T 74* 93* 101*   PROTIME  --   --  13.5   INR  --   --  0.98         Lab 07/12/25 0055   CHOLESTEROL 220*   LDL CHOL 173*   HDL CHOL 34*   TRIGLYCERIDES 71             Brief Urine Lab Results       None          Microbiology Results (last 10 days)       Procedure Component Value - Date/Time    COVID PRE-OP / PRE-PROCEDURE SCREENING ORDER (NO ISOLATION) - Swab, Nasopharynx [[id number]]  (Normal) Collected: 07/11/25 1944    Lab Status: Final result Specimen: Swab from Nasopharynx Updated: 07/11/25 2046    Narrative:      The following orders were created for panel order COVID PRE-OP / PRE-PROCEDURE SCREENING ORDER (NO ISOLATION) - Swab, Nasopharynx.  Procedure                               Abnormality         Status                     ---------                               -----------         ------                     Respiratory Panel PCR w/...[[id number]]  Normal              Final result                 Please view results for these tests on the individual orders.    Respiratory Panel PCR w/COVID-19(SARS-CoV-2) LIAYH/[facility]/DIANE/PAD/COR/REZA In-House, NP Swab in UTM/VTM, 2 HR TAT - Swab, Nasopharynx [[id number]]  (Normal) Collected: 07/11/25 1944    Lab Status: Final result Specimen: Swab from Nasopharynx Updated: 07/11/25 2046     ADENOVIRUS, PCR Not Detected     Coronavirus  229E Not Detected     Coronavirus HKU1 Not Detected     Coronavirus NL63 Not Detected     Coronavirus OC43 Not Detected     COVID19 Not Detected     Human Metapneumovirus Not Detected     Human Rhinovirus/Enterovirus Not Detected     Influenza A PCR Not Detected     Influenza B PCR Not Detected     Parainfluenza Virus 1 Not Detected     Parainfluenza Virus 2 Not Detected     Parainfluenza Virus 3 Not Detected     Parainfluenza Virus 4 Not Detected     RSV, PCR Not Detected     Bordetella pertussis pcr Not Detected     Bordetella parapertussis PCR Not Detected     Chlamydophila pneumoniae PCR Not Detected     Mycoplasma pneumo by PCR Not Detected    Narrative:      In the setting of a positive respiratory panel with a viral infection PLUS a negative procalcitonin without other underlying concern for bacterial infection, consider observing off antibiotics or discontinuation of antibiotics and continue supportive care. If the respiratory panel is positive for atypical bacterial infection (Bordetella pertussis, Chlamydophila pneumoniae, or Mycoplasma pneumoniae), consider antibiotic de-escalation to target atypical bacterial infection.            Cardiac Catheterization/Vascular Study  Result Date: 7/12/2025    Severe 2-vessel CAD (proximal RCA, OM1).  Culprit for NSTEMI appeared to be proximal RCA stenosis.   No left ventriculography performed   Mildly elevated LV filling pressure (LVEDP 17 mmHg)   Mild to moderate diffuse atherosclerosis noted throughout multiple vascular beds present.  Recommend high intensity medical therapy     Optical Coherence Tomography  Result Date: 7/12/2025    Severe 2-vessel CAD (proximal RCA, OM1).  Culprit for NSTEMI appeared to be proximal RCA stenosis.   No left ventriculography performed   Mildly elevated LV filling pressure (LVEDP 17 mmHg)   Mild to moderate diffuse atherosclerosis noted throughout multiple vascular beds present.  Recommend high intensity medical therapy     XR Chest 1  View  Result Date: 7/11/2025  XR CHEST 1 VW Date of Exam: 7/11/2025 8:56 PM EDT Indication: Chest Pain Triage Protocol Comparison: Chest x-ray 6/12/2023 Findings: Normal cardiomediastinal silhouette. The lungs are clear. No pleural effusion or pneumothorax. No acute osseous findings.     Impression: No acute cardiopulmonary findings. Electronically Signed: Ant Olguin MD  7/11/2025 9:33 PM EDT  Workstation ID: [id number]              Results for orders placed during the hospital encounter of 07/11/25    Adult Transthoracic Echo Complete W/ Cont if Necessary Per Protocol 07/13/2025 11:26 AM    Interpretation Summary    Left ventricular systolic function is normal. Estimated left ventricular EF = 55%    Left ventricular wall thickness is consistent with hypertrophy. Sigmoid-shaped ventricular septum is present.    Left ventricular diastolic function is consistent with (grade I) impaired relaxation.    Moderate aortic valve regurgitation is present.    There is a moderate (1-2cm) pericardial effusion. There is no evidence of cardiac tamponade.      Plan for Follow-up of Pending Labs/Results:   Pending Labs       Order Current Status    Potassium Collected (07/13/25 1131)    Lipoprotein A (LPA) In process          Discharge Details        Discharge Medications        New Medications        Instructions Start Date   aspirin 81 MG chewable tablet   81 mg, Oral, Daily      carvedilol 3.125 MG tablet  Commonly known as: COREG   3.125 mg, Oral, 2 Times Daily With Meals      clopidogrel 75 MG tablet  Commonly known as: PLAVIX   75 mg, Oral, Daily      colchicine 0.6 MG tablet   0.6 mg, Oral, Daily      famotidine 20 MG tablet  Commonly known as: PEPCID   20 mg, Oral, 2 Times Daily      losartan 25 MG tablet  Commonly known as: COZAAR   25 mg, Oral, Every 24 Hours Scheduled      nitroglycerin 0.4 MG SL tablet  Commonly known as: NITROSTAT   0.4 mg, Sublingual, Every 5 Minutes PRN, Take no more than 3 doses in 15 minutes.              Changes to Medications        Instructions Start Date   rosuvastatin 20 MG tablet  Commonly known as: CRESTOR  What changed:   medication strength  how much to take   20 mg, Oral, Daily             Continue These Medications        Instructions Start Date   albuterol sulfate  (90 Base) MCG/ACT inhaler  Commonly known as: PROVENTIL HFA;VENTOLIN HFA;PROAIR HFA   2 puffs, Inhalation, Every 4 Hours PRN      amLODIPine 10 MG tablet  Commonly known as: NORVASC   10 mg, Oral, Daily      FLUoxetine 10 MG capsule  Commonly known as: PROzac   10 mg, Oral, Daily      traZODone 100 MG tablet  Commonly known as: DESYREL   100 mg, Oral, Every Night at Bedtime             Stop These Medications      PrilOSEC OTC 20 MG EC tablet  Generic drug: omeprazole OTC              Allergies   Allergen Reactions    Penicillins Hives and Unknown (See Comments)         Discharge Disposition:  Home or Self Care    Diet:  Hospital:  Diet Order   Procedures    Diet: Cardiac; Healthy Heart (2-3 Na+); Fluid Consistency: Thin (IDDSI 0)            Activity:      Restrictions or Other Recommendations:       CODE STATUS:    Code Status and Medical Interventions: CPR (Attempt to Resuscitate); Full Support   Ordered at: 07/11/25 2258     Code Status (Patient has no pulse and is not breathing):    CPR (Attempt to Resuscitate)     Medical Interventions (Patient has pulse or is breathing):    Full Support       Future Appointments   Date Time Provider Department Center   8/6/2025  1:00 PM Mata Maher MD [facility]       Additional Instructions for the Follow-ups that You Need to Schedule       Comprehensive Metabolic Panel    Aug 24, 2025 (Approximate)      Release to patient: Routine Release        LDL Cholesterol, Direct    Aug 24, 2025 (Approximate)      Release to patient: Routine Release                      Emeli Ying PA-C  07/13/25      Time Spent on Discharge:  I spent  33  minutes on this discharge activity which  included: face-to-face encounter with the patient, reviewing the data in the system, coordination of the care with the nursing staff as well as consultants, documentation, and entering orders.

## 2025-07-14 ENCOUNTER — DOCUMENTATION (OUTPATIENT)
Dept: CARDIAC REHAB | Facility: HOSPITAL | Age: 50
End: 2025-07-14
Payer: COMMERCIAL

## 2025-07-14 ENCOUNTER — TRANSITIONAL CARE MANAGEMENT TELEPHONE ENCOUNTER (OUTPATIENT)
Dept: CALL CENTER | Facility: HOSPITAL | Age: 50
End: 2025-07-14
Payer: COMMERCIAL

## 2025-07-14 ENCOUNTER — READMISSION MANAGEMENT (OUTPATIENT)
Dept: CALL CENTER | Facility: HOSPITAL | Age: 50
End: 2025-07-14
Payer: COMMERCIAL

## 2025-07-14 LAB — LPA SERPL-SCNC: 38.8 NMOL/L

## 2025-07-14 NOTE — OUTREACH NOTE
Call Center TCM Note      Flowsheet Row Responses   [facility] patient discharged from? [place]   Does the patient have one of the following disease processes/diagnoses(primary or secondary)? Acute MI (STEMI,NSTEMI)   TCM attempt successful? Yes   Call start time 0857   Call end time 0902   Discharge diagnosis NSTEMI (non-ST elevated myocardial infarction)   Person spoke with today (if not patient) and relationship Wife   Meds reviewed with patient/caregiver? Yes   Is the patient having any side effects they believe may be caused by any medication additions or changes? No   Does the patient have all prescriptions related to this admission filled (includes statins,anticoagulants,HTN meds,anti-arrhythmia meds) Yes   Is the patient taking all medications as directed (includes completed medication regime)? Yes   Comments Wife defers to letting  schedule followups. Patient does have a previously scheduled followup on 8/6 with Dr. Maher   Does the patient have an appointment with their PCP within 7-14 days of discharge? No   Nursing Interventions Patient declined scheduling/rescheduling appointment at this time   Psychosocial issues? No   Did the patient receive a copy of their discharge instructions? Yes   Nursing interventions Reviewed instructions with patient   What is the patient's perception of their health status since discharge? Improving   Nursing interventions Nurse provided patient education   Is the patient/caregiver able to teach back signs and symptoms of when to call for help immediately: Sudden chest discomfort, Sudden discomfort in arms, back, neck or jaw, Shortness of breath at any time   Nursing interventions Nurse provided patient education   Is the patient/caregiver able to teach back ways to prevent a second heart attack: Take medications, Follow up with MD   Is the patient/caregiver able to teach back the hierarchy of who to call/visit for symptoms/problems? PCP, Specialist, Home  health nurse, Urgent Care, ED, 911 Yes   TCM call completed? Yes   Wrap up additional comments Wife reports patient doing well. No needs at this time.   Call end time 0902   Would this patient benefit from a Referral to [facility] Social Work? No   Is the patient interested in additional calls from an ambulatory ? No            Osmany WILSON - Registered Nurse    7/14/2025, 09:03 EDT

## 2025-07-14 NOTE — OUTREACH NOTE
Prep Survey      Flowsheet Row Responses   [facility] patient discharged from? [place]   Is LACE score < 7 ? No   Eligibility [facility]   Date of Admission 07/11/25   Date of Discharge 07/13/25   Discharge Disposition Home or Self Care   Discharge diagnosis NSTEMI (non-ST elevated myocardial infarction)   Does the patient have one of the following disease processes/diagnoses(primary or secondary)? Acute MI (STEMI,NSTEMI)   Does the patient have Home health ordered? No   Is there a DME ordered? No   Prep survey completed? Yes            VERNON MONSON - Registered Nurse

## 2025-07-15 ENCOUNTER — TELEPHONE (OUTPATIENT)
Dept: CARDIAC REHAB | Facility: HOSPITAL | Age: 50
End: 2025-07-15
Payer: COMMERCIAL

## 2025-07-15 NOTE — TELEPHONE ENCOUNTER
Patient has a Phase II cardiac rehab referral. Staff calls to inform patient but there is no answer, no voicemail.   Staff will follow up.

## 2025-07-21 ENCOUNTER — OFFICE VISIT (OUTPATIENT)
Dept: INTERNAL MEDICINE | Facility: CLINIC | Age: 50
End: 2025-07-21

## 2025-07-21 VITALS
SYSTOLIC BLOOD PRESSURE: 116 MMHG | WEIGHT: 180.8 LBS | HEART RATE: 70 BPM | TEMPERATURE: 97.1 F | HEIGHT: 67 IN | DIASTOLIC BLOOD PRESSURE: 70 MMHG | OXYGEN SATURATION: 97 % | BODY MASS INDEX: 28.38 KG/M2

## 2025-07-21 DIAGNOSIS — E78.5 HYPERLIPIDEMIA LDL GOAL <50: ICD-10-CM

## 2025-07-21 DIAGNOSIS — I30.9 ACUTE PERICARDITIS, UNSPECIFIED TYPE: ICD-10-CM

## 2025-07-21 DIAGNOSIS — I25.119 CORONARY ARTERY DISEASE INVOLVING NATIVE CORONARY ARTERY OF NATIVE HEART WITH ANGINA PECTORIS: ICD-10-CM

## 2025-07-21 DIAGNOSIS — Z09 HOSPITAL DISCHARGE FOLLOW-UP: Primary | ICD-10-CM

## 2025-07-21 DIAGNOSIS — I10 HYPERTENSION, UNSPECIFIED TYPE: ICD-10-CM

## 2025-07-21 PROCEDURE — 99495 TRANSJ CARE MGMT MOD F2F 14D: CPT | Performed by: STUDENT IN AN ORGANIZED HEALTH CARE EDUCATION/TRAINING PROGRAM

## 2025-07-21 RX ORDER — PREDNISONE 20 MG/1
40 TABLET ORAL DAILY
Qty: 10 TABLET | Refills: 0 | Status: ON HOLD | OUTPATIENT
Start: 2025-07-21 | End: 2025-07-26

## 2025-07-21 NOTE — LETTER
July 21, 2025     Patient: Wilmer Stover   YOB: 1975   Date of Visit: 7/21/2025       To Whom It May Concern:    It is my medical opinion that Wilmer Stover will need to be excused from work until 8/13/2025. He will be reevaluated for clearance then.            Sincerely,        Mata Maher MD    CC: No Recipients

## 2025-07-21 NOTE — PROGRESS NOTES
Transitional Care Follow Up Visit  Subjective     Wilmer Francisco Stover is a 50 y.o. male who presents for a transitional care management visit.    Within 48 business hours after discharge our office contacted him via telephone to coordinate his care and needs.      I reviewed and discussed the details of that call along with the discharge summary, hospital problems, inpatient lab results, inpatient diagnostic studies, and consultation reports with Wilmer.     Current outpatient and discharge medications have been reconciled for the patient.  Reviewed by: Mata Maher MD          7/14/2025     7:29 AM   Date of TCM Phone Call   [facility]   Date of Admission 7/11/2025   Date of Discharge 7/13/2025   Discharge Disposition Home or Self Care     Risk for Readmission (LACE) Score: 8 (7/13/2025  6:00 AM)      History of Present Illness  Course During Hospital Stay:      Here for follow up after NSTEMI At hospital. Intially presented after 3 days of CO  He underwent left heart cath, and had 1 LETICIA placed in the proximal RCA. Since discharge patient is doing well overall. No new worsening chest pain, still has some discomfort in his right arm where they placed the catheter. No bruising. He is tolerating his new medications.   He is on ASA and plavix   Beta blocker and ARB for blood pressure control and CAD.  Dslpidemia on statin.        The following portions of the patient's history were reviewed and updated as appropriate: allergies, current medications, past family history, past medical history, past social history, past surgical history, and problem list.    Review of Systems   All other systems reviewed and are negative.      Objective   Vitals:    07/21/25 1132   BP: 116/70   Pulse: 70   Temp: 97.1 °F (36.2 °C)   SpO2: 97%     Body mass index is 28.31 kg/m².     Physical Exam  Constitutional:       Appearance: Normal appearance.   Cardiovascular:      Rate and Rhythm: Normal rate and regular  rhythm.      Pulses: Normal pulses.   Pulmonary:      Effort: Pulmonary effort is normal.      Breath sounds: Normal breath sounds.   Neurological:      Mental Status: He is alert.         Assessment & Plan   1. Hospital discharge follow-up  Hospital Records Reviewed      2. Hypertension, unspecified type  Controled today  Continue with amlodipine  Recently was added Losartan and Carvedilol    3. Coronary artery disease involving native coronary artery of native heart with angina pectoris  Continue with ASA and plavix, he has a LETICIA stent placed  Follow up Cardiology    4. Acute pericarditis, unspecified type    - predniSONE (DELTASONE) 20 MG tablet; Take 2 tablets by mouth Daily for 5 days.  Dispense: 10 tablet; Refill: 0    5. Hyperlipidemia LDL goal <50  Crestor 80mg daily

## 2025-07-25 ENCOUNTER — READMISSION MANAGEMENT (OUTPATIENT)
Dept: CALL CENTER | Facility: HOSPITAL | Age: 50
End: 2025-07-25

## 2025-07-25 NOTE — OUTREACH NOTE
[facility] Week 2 Survey      Flowsheet Row Responses   [facility] patient discharged from? [facility]   Does the patient have one of the following disease processes/diagnoses(primary or secondary)? Acute MI (STEMI,NSTEMI)   Week 2 attempt successful? Yes   Call start time [id number]   Call end time 1653   Discharge diagnosis NSTEMI (non-ST elevated myocardial infarction)   Is patient permission given to speak with other caregiver? Yes   List who call center can speak with wife   Meds reviewed with patient/caregiver? Yes   Is the patient having any side effects they believe may be caused by any medication additions or changes? Yes   Side effects comments  Pt is unsure if the change to Pepcid is strong enough for his stomach issues he reports.   Does the patient have all prescriptions related to this admission filled (includes statins,anticoagulants,HTN meds,anti-arrhythmia meds) Yes   Is the patient taking all medications as directed (includes completed medication regime)? Yes   Has the patient kept scheduled appointments due by today? Yes   Comments Pt [facility], this nurse strongly encouraged pt to contact and reschedule this appt, pt v/u.   Comments the pt reports that his Right wrist bruising is fading, site remains sore, denies signs of infection. Pt c/o SOA w/exertion that resolves with rest. Pt c/o that yesterday he had chest pain located in center, to the right side, rating 3/10, stabbing in nature, this occured while sitting on side of bed, resolved by relaxing and deep breathing. /70 HR- he is unsure, per his report. Patient c/o daily lung pain, left side mid ribcage that occurs sometimes bilaterally or one side or the other, described as sharp when he inhales. Pt reports that he has had to give up coffee r/t causing him chest pain, pt reports that he had coffee yesterday. Pt c/o diarrhea, abdominal cramping as well. Rn advised pt to contact MD for f/u appt   Did the  patient receive a copy of their discharge instructions? Yes   Nursing interventions Reviewed instructions with patient   What is the patient's perception of their health status since discharge? New symptoms unrelated to diagnosis   Nursing interventions Nurse provided patient education, Advised patient to call provider   Is the patient/caregiver able to teach back signs and symptoms of when to call for help immediately: Sudden chest discomfort, Sudden discomfort in arms, back, neck or jaw   Is the patient/caregiver able to teach back lifestyle changes to help prevent MIs Heart healthy diet   Is the patient/caregiver able to teach back ways to prevent a second heart attack: Follow up with MD   If the patient is a current smoker, are they able to teach back resources for cessation? --  [Patient is reducing the amount that he smokes per pt report.]   Is the patient/caregiver able to teach back the hierarchy of who to call/visit for symptoms/problems? PCP, Specialist, Home health nurse, Urgent Care, ED, 911 Yes   Week 2 call completed? Yes   Call end time [id number]            Teri Morales Registered Nurse

## 2025-07-26 ENCOUNTER — APPOINTMENT (OUTPATIENT)
Dept: GENERAL RADIOLOGY | Facility: HOSPITAL | Age: 50
End: 2025-07-26
Payer: MEDICAID

## 2025-07-26 ENCOUNTER — APPOINTMENT (OUTPATIENT)
Dept: CT IMAGING | Facility: HOSPITAL | Age: 50
End: 2025-07-26
Payer: MEDICAID

## 2025-07-26 ENCOUNTER — HOSPITAL ENCOUNTER (INPATIENT)
Facility: HOSPITAL | Age: 50
LOS: 5 days | Discharge: HOME | End: 2025-07-31
Attending: EMERGENCY MEDICINE | Admitting: INTERNAL MEDICINE
Payer: MEDICAID

## 2025-07-26 DIAGNOSIS — I48.91 ATRIAL FIBRILLATION WITH RVR: ICD-10-CM

## 2025-07-26 DIAGNOSIS — I30.9 ACUTE PERICARDITIS, UNSPECIFIED TYPE: ICD-10-CM

## 2025-07-26 DIAGNOSIS — R91.8 MASS OF UPPER LOBE OF RIGHT LUNG: ICD-10-CM

## 2025-07-26 DIAGNOSIS — I31.39 PERICARDIAL EFFUSION: ICD-10-CM

## 2025-07-26 DIAGNOSIS — F17.200 SMOKER: ICD-10-CM

## 2025-07-26 DIAGNOSIS — A41.9 ACUTE SEPSIS: Primary | ICD-10-CM

## 2025-07-26 DIAGNOSIS — J18.9 PNEUMONIA DUE TO INFECTIOUS ORGANISM, UNSPECIFIED LATERALITY, UNSPECIFIED PART OF LUNG: ICD-10-CM

## 2025-07-26 PROBLEM — Z87.891 PERSONAL HISTORY OF NICOTINE DEPENDENCE: Chronic | Status: ACTIVE | Noted: 2025-07-26

## 2025-07-26 PROBLEM — I10 ESSENTIAL HYPERTENSION: Chronic | Status: ACTIVE | Noted: 2025-07-12

## 2025-07-26 PROBLEM — I31.9 PERICARDITIS: Status: ACTIVE | Noted: 2025-07-26

## 2025-07-26 PROBLEM — I25.10 CORONARY ARTERY DISEASE INVOLVING NATIVE CORONARY ARTERY OF NATIVE HEART: Status: ACTIVE | Noted: 2025-07-12

## 2025-07-26 PROBLEM — E11.9 TYPE 2 DIABETES MELLITUS WITHOUT COMPLICATION, WITHOUT LONG-TERM CURRENT USE OF INSULIN: Status: ACTIVE | Noted: 2025-07-26

## 2025-07-26 PROBLEM — R65.20 SEPSIS WITH ACUTE ORGAN DYSFUNCTION: Status: ACTIVE | Noted: 2025-07-26

## 2025-07-26 LAB
ALBUMIN SERPL-MCNC: 4.3 G/DL (ref 3.5–5.2)
ALBUMIN/GLOB SERPL: 1 G/DL
ALP SERPL-CCNC: 114 U/L (ref 39–117)
ALT SERPL W P-5'-P-CCNC: 19 U/L (ref 1–41)
ANION GAP SERPL CALCULATED.3IONS-SCNC: 13.5 MMOL/L (ref 5–15)
AST SERPL-CCNC: 24 U/L (ref 1–40)
B PARAPERT DNA SPEC QL NAA+PROBE: NOT DETECTED
B PERT DNA SPEC QL NAA+PROBE: NOT DETECTED
BASOPHILS # BLD AUTO: 0.07 10*3/MM3 (ref 0–0.2)
BASOPHILS NFR BLD AUTO: 0.4 % (ref 0–1.5)
BILIRUB SERPL-MCNC: 0.9 MG/DL (ref 0–1.2)
BUN SERPL-MCNC: 8.7 MG/DL (ref 6–20)
BUN/CREAT SERPL: 8.9 (ref 7–25)
C PNEUM DNA NPH QL NAA+NON-PROBE: NOT DETECTED
CALCIUM SPEC-SCNC: 9.8 MG/DL (ref 8.6–10.5)
CHLORIDE SERPL-SCNC: 99 MMOL/L (ref 98–107)
CO2 SERPL-SCNC: 23.5 MMOL/L (ref 22–29)
CREAT SERPL-MCNC: 0.98 MG/DL (ref 0.76–1.27)
D DIMER PPP FEU-MCNC: 5.75 MCGFEU/ML (ref 0–0.5)
D-LACTATE SERPL-SCNC: 0.7 MMOL/L (ref 0.5–2)
DEPRECATED RDW RBC AUTO: 36.1 FL (ref 37–54)
EGFRCR SERPLBLD CKD-EPI 2021: 93.9 ML/MIN/1.73
EOSINOPHIL # BLD AUTO: 0.12 10*3/MM3 (ref 0–0.4)
EOSINOPHIL NFR BLD AUTO: 0.7 % (ref 0.3–6.2)
ERYTHROCYTE [DISTWIDTH] IN BLOOD BY AUTOMATED COUNT: 11.4 % (ref 12.3–15.4)
ETHANOL BLD-MCNC: <10 MG/DL (ref 0–10)
FLUAV SUBTYP SPEC NAA+PROBE: NOT DETECTED
FLUBV RNA NPH QL NAA+NON-PROBE: NOT DETECTED
GEN 5 1HR TROPONIN T REFLEX: 10 NG/L
GLOBULIN UR ELPH-MCNC: 4.3 GM/DL
GLUCOSE SERPL-MCNC: 126 MG/DL (ref 65–99)
HADV DNA SPEC NAA+PROBE: NOT DETECTED
HCOV 229E RNA SPEC QL NAA+PROBE: NOT DETECTED
HCOV HKU1 RNA SPEC QL NAA+PROBE: NOT DETECTED
HCOV NL63 RNA SPEC QL NAA+PROBE: NOT DETECTED
HCOV OC43 RNA SPEC QL NAA+PROBE: NOT DETECTED
HCT VFR BLD AUTO: 45 % (ref 37.5–51)
HGB BLD-MCNC: 15.1 G/DL (ref 13–17.7)
HMPV RNA NPH QL NAA+NON-PROBE: NOT DETECTED
HOLD SPECIMEN: NORMAL
HPIV1 RNA ISLT QL NAA+PROBE: NOT DETECTED
HPIV2 RNA SPEC QL NAA+PROBE: NOT DETECTED
HPIV3 RNA NPH QL NAA+PROBE: NOT DETECTED
HPIV4 P GENE NPH QL NAA+PROBE: NOT DETECTED
IMM GRANULOCYTES # BLD AUTO: 0.06 10*3/MM3 (ref 0–0.05)
IMM GRANULOCYTES NFR BLD AUTO: 0.4 % (ref 0–0.5)
LIPASE SERPL-CCNC: 18 U/L (ref 13–60)
LYMPHOCYTES # BLD AUTO: 1.59 10*3/MM3 (ref 0.7–3.1)
LYMPHOCYTES NFR BLD AUTO: 9.6 % (ref 19.6–45.3)
M PNEUMO IGG SER IA-ACNC: NOT DETECTED
MAGNESIUM SERPL-MCNC: 2.2 MG/DL (ref 1.6–2.6)
MCH RBC QN AUTO: 29 PG (ref 26.6–33)
MCHC RBC AUTO-ENTMCNC: 33.6 G/DL (ref 31.5–35.7)
MCV RBC AUTO: 86.5 FL (ref 79–97)
MONOCYTES # BLD AUTO: 1.57 10*3/MM3 (ref 0.1–0.9)
MONOCYTES NFR BLD AUTO: 9.5 % (ref 5–12)
NEUTROPHILS NFR BLD AUTO: 13.11 10*3/MM3 (ref 1.7–7)
NEUTROPHILS NFR BLD AUTO: 79.4 % (ref 42.7–76)
NRBC BLD AUTO-RTO: 0 /100 WBC (ref 0–0.2)
NT-PROBNP SERPL-MCNC: 190 PG/ML (ref 0–900)
NT-PROBNP SERPL-MCNC: 223 PG/ML (ref 0–900)
PHOSPHATE SERPL-MCNC: 3.2 MG/DL (ref 2.5–4.5)
PLATELET # BLD AUTO: 599 10*3/MM3 (ref 140–450)
PMV BLD AUTO: 9.4 FL (ref 6–12)
POTASSIUM SERPL-SCNC: 3.9 MMOL/L (ref 3.5–5.2)
PROCALCITONIN SERPL-MCNC: 0.06 NG/ML (ref 0–0.25)
PROT SERPL-MCNC: 8.6 G/DL (ref 6–8.5)
RBC # BLD AUTO: 5.2 10*6/MM3 (ref 4.14–5.8)
RHINOVIRUS RNA SPEC NAA+PROBE: NOT DETECTED
RSV RNA NPH QL NAA+NON-PROBE: NOT DETECTED
SARS-COV-2 RNA RESP QL NAA+PROBE: NOT DETECTED
SODIUM SERPL-SCNC: 136 MMOL/L (ref 136–145)
TROPONIN T NUMERIC DELTA: 1 NG/L
TROPONIN T SERPL HS-MCNC: 43 NG/L
TROPONIN T SERPL HS-MCNC: 9 NG/L
WBC NRBC COR # BLD AUTO: 16.52 10*3/MM3 (ref 3.4–10.8)
WHOLE BLOOD HOLD COAG: NORMAL
WHOLE BLOOD HOLD SPECIMEN: NORMAL

## 2025-07-26 PROCEDURE — 85379 FIBRIN DEGRADATION QUANT: CPT | Performed by: EMERGENCY MEDICINE

## 2025-07-26 PROCEDURE — 83050 HGB METHEMOGLOBIN QUAN: CPT

## 2025-07-26 PROCEDURE — 25510000001 IOPAMIDOL PER 1 ML: Performed by: EMERGENCY MEDICINE

## 2025-07-26 PROCEDURE — 25010000002 HEPARIN (PORCINE) PER 1000 UNITS: Performed by: INTERNAL MEDICINE

## 2025-07-26 PROCEDURE — 80053 COMPREHEN METABOLIC PANEL: CPT | Performed by: EMERGENCY MEDICINE

## 2025-07-26 PROCEDURE — 82077 ASSAY SPEC XCP UR&BREATH IA: CPT | Performed by: INTERNAL MEDICINE

## 2025-07-26 PROCEDURE — 82375 ASSAY CARBOXYHB QUANT: CPT

## 2025-07-26 PROCEDURE — 83880 ASSAY OF NATRIURETIC PEPTIDE: CPT | Performed by: EMERGENCY MEDICINE

## 2025-07-26 PROCEDURE — 71045 X-RAY EXAM CHEST 1 VIEW: CPT

## 2025-07-26 PROCEDURE — 99223 1ST HOSP IP/OBS HIGH 75: CPT | Performed by: INTERNAL MEDICINE

## 2025-07-26 PROCEDURE — 74018 RADEX ABDOMEN 1 VIEW: CPT

## 2025-07-26 PROCEDURE — 84145 PROCALCITONIN (PCT): CPT | Performed by: EMERGENCY MEDICINE

## 2025-07-26 PROCEDURE — 84484 ASSAY OF TROPONIN QUANT: CPT | Performed by: INTERNAL MEDICINE

## 2025-07-26 PROCEDURE — 99285 EMERGENCY DEPT VISIT HI MDM: CPT

## 2025-07-26 PROCEDURE — 82805 BLOOD GASES W/O2 SATURATION: CPT

## 2025-07-26 PROCEDURE — 71275 CT ANGIOGRAPHY CHEST: CPT

## 2025-07-26 PROCEDURE — 93005 ELECTROCARDIOGRAM TRACING: CPT

## 2025-07-26 PROCEDURE — 36415 COLL VENOUS BLD VENIPUNCTURE: CPT

## 2025-07-26 PROCEDURE — 25810000003 SODIUM CHLORIDE 0.9 % SOLUTION: Performed by: INTERNAL MEDICINE

## 2025-07-26 PROCEDURE — 93005 ELECTROCARDIOGRAM TRACING: CPT | Performed by: EMERGENCY MEDICINE

## 2025-07-26 PROCEDURE — 25010000002 KETOROLAC TROMETHAMINE PER 15 MG: Performed by: EMERGENCY MEDICINE

## 2025-07-26 PROCEDURE — 84484 ASSAY OF TROPONIN QUANT: CPT | Performed by: EMERGENCY MEDICINE

## 2025-07-26 PROCEDURE — 0202U NFCT DS 22 TRGT SARS-COV-2: CPT | Performed by: EMERGENCY MEDICINE

## 2025-07-26 PROCEDURE — 84100 ASSAY OF PHOSPHORUS: CPT | Performed by: INTERNAL MEDICINE

## 2025-07-26 PROCEDURE — 83880 ASSAY OF NATRIURETIC PEPTIDE: CPT | Performed by: INTERNAL MEDICINE

## 2025-07-26 PROCEDURE — 83735 ASSAY OF MAGNESIUM: CPT | Performed by: INTERNAL MEDICINE

## 2025-07-26 PROCEDURE — 25010000002 CEFEPIME PER 500 MG: Performed by: INTERNAL MEDICINE

## 2025-07-26 PROCEDURE — 36600 WITHDRAWAL OF ARTERIAL BLOOD: CPT

## 2025-07-26 PROCEDURE — 85025 COMPLETE CBC W/AUTO DIFF WBC: CPT | Performed by: EMERGENCY MEDICINE

## 2025-07-26 PROCEDURE — 83690 ASSAY OF LIPASE: CPT | Performed by: EMERGENCY MEDICINE

## 2025-07-26 PROCEDURE — 87040 BLOOD CULTURE FOR BACTERIA: CPT | Performed by: EMERGENCY MEDICINE

## 2025-07-26 PROCEDURE — 83605 ASSAY OF LACTIC ACID: CPT | Performed by: INTERNAL MEDICINE

## 2025-07-26 PROCEDURE — 87641 MR-STAPH DNA AMP PROBE: CPT

## 2025-07-26 PROCEDURE — 25010000002 CEFTRIAXONE PER 250 MG: Performed by: EMERGENCY MEDICINE

## 2025-07-26 PROCEDURE — 25810000003 SODIUM CHLORIDE 0.9 % SOLUTION: Performed by: EMERGENCY MEDICINE

## 2025-07-26 PROCEDURE — 25010000002 MORPHINE PER 10 MG: Performed by: INTERNAL MEDICINE

## 2025-07-26 RX ORDER — HEPARIN SODIUM 5000 [USP'U]/ML
5000 INJECTION, SOLUTION INTRAVENOUS; SUBCUTANEOUS EVERY 12 HOURS SCHEDULED
Status: DISCONTINUED | OUTPATIENT
Start: 2025-07-26 | End: 2025-07-27

## 2025-07-26 RX ORDER — IOPAMIDOL 755 MG/ML
100 INJECTION, SOLUTION INTRAVASCULAR
Status: COMPLETED | OUTPATIENT
Start: 2025-07-26 | End: 2025-07-26

## 2025-07-26 RX ORDER — INDOMETHACIN 25 MG/1
25 CAPSULE ORAL
Status: DISCONTINUED | OUTPATIENT
Start: 2025-07-27 | End: 2025-07-27

## 2025-07-26 RX ORDER — NICOTINE 21 MG/24HR
1 PATCH, TRANSDERMAL 24 HOURS TRANSDERMAL EVERY 24 HOURS
Status: DISCONTINUED | OUTPATIENT
Start: 2025-07-26 | End: 2025-07-31 | Stop reason: HOSPADM

## 2025-07-26 RX ORDER — FAMOTIDINE 20 MG/1
40 TABLET, FILM COATED ORAL DAILY
Status: DISCONTINUED | OUTPATIENT
Start: 2025-07-27 | End: 2025-07-27

## 2025-07-26 RX ORDER — DEXTROSE MONOHYDRATE 25 G/50ML
25 INJECTION, SOLUTION INTRAVENOUS
Status: DISCONTINUED | OUTPATIENT
Start: 2025-07-26 | End: 2025-07-31 | Stop reason: HOSPADM

## 2025-07-26 RX ORDER — ASPIRIN 81 MG/1
324 TABLET, CHEWABLE ORAL ONCE
Status: COMPLETED | OUTPATIENT
Start: 2025-07-26 | End: 2025-07-26

## 2025-07-26 RX ORDER — MORPHINE SULFATE 2 MG/ML
1 INJECTION, SOLUTION INTRAMUSCULAR; INTRAVENOUS EVERY 4 HOURS PRN
Status: DISCONTINUED | OUTPATIENT
Start: 2025-07-26 | End: 2025-07-31 | Stop reason: HOSPADM

## 2025-07-26 RX ORDER — SODIUM CHLORIDE 9 MG/ML
40 INJECTION, SOLUTION INTRAVENOUS AS NEEDED
Status: DISCONTINUED | OUTPATIENT
Start: 2025-07-26 | End: 2025-07-26

## 2025-07-26 RX ORDER — INSULIN LISPRO 100 [IU]/ML
2-7 INJECTION, SOLUTION INTRAVENOUS; SUBCUTANEOUS
Status: DISCONTINUED | OUTPATIENT
Start: 2025-07-27 | End: 2025-07-31 | Stop reason: HOSPADM

## 2025-07-26 RX ORDER — NICOTINE POLACRILEX 4 MG
15 LOZENGE BUCCAL
Status: DISCONTINUED | OUTPATIENT
Start: 2025-07-26 | End: 2025-07-31 | Stop reason: HOSPADM

## 2025-07-26 RX ORDER — BISACODYL 10 MG
10 SUPPOSITORY, RECTAL RECTAL DAILY PRN
Status: DISCONTINUED | OUTPATIENT
Start: 2025-07-26 | End: 2025-07-31 | Stop reason: HOSPADM

## 2025-07-26 RX ORDER — POLYETHYLENE GLYCOL 3350 17 G/17G
17 POWDER, FOR SOLUTION ORAL DAILY PRN
Status: DISCONTINUED | OUTPATIENT
Start: 2025-07-26 | End: 2025-07-31 | Stop reason: HOSPADM

## 2025-07-26 RX ORDER — SODIUM CHLORIDE 0.9 % (FLUSH) 0.9 %
10 SYRINGE (ML) INJECTION AS NEEDED
Status: DISCONTINUED | OUTPATIENT
Start: 2025-07-26 | End: 2025-07-31 | Stop reason: HOSPADM

## 2025-07-26 RX ORDER — IPRATROPIUM BROMIDE AND ALBUTEROL SULFATE 2.5; .5 MG/3ML; MG/3ML
3 SOLUTION RESPIRATORY (INHALATION) EVERY 4 HOURS PRN
Status: DISCONTINUED | OUTPATIENT
Start: 2025-07-26 | End: 2025-07-31 | Stop reason: HOSPADM

## 2025-07-26 RX ORDER — ACETAMINOPHEN 500 MG
1000 TABLET ORAL ONCE
Status: COMPLETED | OUTPATIENT
Start: 2025-07-26 | End: 2025-07-26

## 2025-07-26 RX ORDER — NITROGLYCERIN 0.4 MG/1
0.4 TABLET SUBLINGUAL
Status: DISCONTINUED | OUTPATIENT
Start: 2025-07-26 | End: 2025-07-27

## 2025-07-26 RX ORDER — BISACODYL 5 MG/1
5 TABLET, DELAYED RELEASE ORAL DAILY PRN
Status: DISCONTINUED | OUTPATIENT
Start: 2025-07-26 | End: 2025-07-31 | Stop reason: HOSPADM

## 2025-07-26 RX ORDER — COLCHICINE 0.6 MG/1
0.6 TABLET ORAL DAILY
Status: DISCONTINUED | OUTPATIENT
Start: 2025-07-26 | End: 2025-07-27

## 2025-07-26 RX ORDER — VANCOMYCIN 1.75 GRAM/500 ML IN 0.9 % SODIUM CHLORIDE INTRAVENOUS
20 ONCE
Status: COMPLETED | OUTPATIENT
Start: 2025-07-26 | End: 2025-07-27

## 2025-07-26 RX ORDER — KETOROLAC TROMETHAMINE 15 MG/ML
15 INJECTION, SOLUTION INTRAMUSCULAR; INTRAVENOUS ONCE
Status: COMPLETED | OUTPATIENT
Start: 2025-07-26 | End: 2025-07-26

## 2025-07-26 RX ORDER — LINEZOLID 2 MG/ML
600 INJECTION, SOLUTION INTRAVENOUS EVERY 12 HOURS SCHEDULED
Status: DISCONTINUED | OUTPATIENT
Start: 2025-07-26 | End: 2025-07-26

## 2025-07-26 RX ORDER — AZITHROMYCIN 250 MG/1
500 TABLET, FILM COATED ORAL ONCE
Status: COMPLETED | OUTPATIENT
Start: 2025-07-26 | End: 2025-07-26

## 2025-07-26 RX ORDER — IBUPROFEN 600 MG/1
1 TABLET ORAL
Status: DISCONTINUED | OUTPATIENT
Start: 2025-07-26 | End: 2025-07-31 | Stop reason: HOSPADM

## 2025-07-26 RX ORDER — ONDANSETRON 2 MG/ML
4 INJECTION INTRAMUSCULAR; INTRAVENOUS EVERY 6 HOURS PRN
Status: DISCONTINUED | OUTPATIENT
Start: 2025-07-26 | End: 2025-07-31 | Stop reason: HOSPADM

## 2025-07-26 RX ORDER — SODIUM CHLORIDE 0.9 % (FLUSH) 0.9 %
10 SYRINGE (ML) INJECTION AS NEEDED
Status: DISCONTINUED | OUTPATIENT
Start: 2025-07-26 | End: 2025-07-26

## 2025-07-26 RX ORDER — AMOXICILLIN 250 MG
2 CAPSULE ORAL 2 TIMES DAILY PRN
Status: DISCONTINUED | OUTPATIENT
Start: 2025-07-26 | End: 2025-07-31 | Stop reason: HOSPADM

## 2025-07-26 RX ORDER — NALOXONE HCL 0.4 MG/ML
0.4 VIAL (ML) INJECTION
Status: DISCONTINUED | OUTPATIENT
Start: 2025-07-26 | End: 2025-07-31 | Stop reason: HOSPADM

## 2025-07-26 RX ORDER — SODIUM CHLORIDE 0.9 % (FLUSH) 0.9 %
10 SYRINGE (ML) INJECTION EVERY 12 HOURS SCHEDULED
Status: DISCONTINUED | OUTPATIENT
Start: 2025-07-26 | End: 2025-07-26

## 2025-07-26 RX ORDER — SODIUM CHLORIDE 9 MG/ML
50 INJECTION, SOLUTION INTRAVENOUS CONTINUOUS
Status: ACTIVE | OUTPATIENT
Start: 2025-07-26 | End: 2025-07-27

## 2025-07-26 RX ADMIN — KETOROLAC TROMETHAMINE 15 MG: 15 INJECTION, SOLUTION INTRAMUSCULAR; INTRAVENOUS at 19:39

## 2025-07-26 RX ADMIN — NICOTINE 1 PATCH: 14 PATCH TRANSDERMAL at 23:01

## 2025-07-26 RX ADMIN — COLCHICINE 0.6 MG: 0.6 TABLET ORAL at 20:39

## 2025-07-26 RX ADMIN — SODIUM CHLORIDE 1000 ML: 9 INJECTION, SOLUTION INTRAVENOUS at 18:08

## 2025-07-26 RX ADMIN — SODIUM CHLORIDE 50 ML/HR: 9 INJECTION, SOLUTION INTRAVENOUS at 23:06

## 2025-07-26 RX ADMIN — AZITHROMYCIN 500 MG: 250 TABLET, FILM COATED ORAL at 19:38

## 2025-07-26 RX ADMIN — ACETAMINOPHEN 1000 MG: 500 TABLET, FILM COATED ORAL at 17:11

## 2025-07-26 RX ADMIN — ASPIRIN 324 MG: 81 TABLET, CHEWABLE ORAL at 17:11

## 2025-07-26 RX ADMIN — IOPAMIDOL 75 ML: 755 INJECTION, SOLUTION INTRAVENOUS at 18:41

## 2025-07-26 RX ADMIN — CEFEPIME 2000 MG: 2 INJECTION, POWDER, FOR SOLUTION INTRAVENOUS at 23:00

## 2025-07-26 RX ADMIN — CEFTRIAXONE SODIUM 1000 MG: 1 INJECTION, POWDER, FOR SOLUTION INTRAMUSCULAR; INTRAVENOUS at 19:42

## 2025-07-26 RX ADMIN — MORPHINE SULFATE 1 MG: 2 INJECTION, SOLUTION INTRAMUSCULAR; INTRAVENOUS at 22:13

## 2025-07-26 RX ADMIN — Medication 1750 MG: at 23:31

## 2025-07-26 RX ADMIN — HEPARIN SODIUM 5000 UNITS: 5000 INJECTION INTRAVENOUS; SUBCUTANEOUS at 23:01

## 2025-07-26 NOTE — ED PROVIDER NOTES
[facility]    EMERGENCY DEPARTMENT ENCOUNTER      Pt Name: Wilmer Stover  MRN: [id number]  YOB: 1975  Date of evaluation: 7/26/2025  Provider: Chris Hernandez MD    CHIEF COMPLAINT       Chief Complaint   Patient presents with    Chest Pain         HISTORY OF PRESENT ILLNESS   Wilmer Stover is a 50 y.o. male who presents to the emergency department with complaint of chest pain, shortness of breath. Pain is worse with taking a deep breath. Has had some dry cough, denies fever, chills. Recent tx for nonstemi, pericarditis.        Nursing notes were reviewed.    REVIEW OF SYSTEMS     ROS:  A chief complaint appropriate review of systems was completed and is negative except as noted in the HPI.      PAST MEDICAL HISTORY     Past Medical History:   Diagnosis Date    Allergic     Anxiety     Arthritis     Asthma     COPD (chronic obstructive pulmonary disease)     Depression     Gall stones     Hepatitis C     Hodgkin's lymphoma     Hyperlipidemia     Hypertension     Kidney stone     Memory loss     Neuropathy     Numbness and tingling     Thyroid disease     Weakness          SURGICAL HISTORY       Past Surgical History:   Procedure Laterality Date    CARDIAC CATHETERIZATION N/A 7/12/2025    Procedure: Left Heart Cath;  Surgeon: Slick Lama IV, MD;  [facility]: [facility] CATH [facility];  Service: Cardiovascular;  Laterality: N/A;    CARDIAC CATHETERIZATION N/A 7/12/2025    Procedure: Optical Coherence Tomography;  Surgeon: Slick Lama IV, MD;  Location:  [facility] CATH INVASIVE LOCATION;  Service: Cardiovascular;  Laterality: N/A;    CARDIAC CATHETERIZATION N/A 7/12/2025    Procedure: Stent LETICIA coronary;  Surgeon: Slick Lama IV, MD;  Location:  [facility] CATH INVASIVE LOCATION;  Service: Cardiovascular;  Laterality: N/A;    CHOLECYSTECTOMY      CYSTOSCOPY BLADDER STONE LITHOTRIPSY      MASTECTOMY MODIFIED RADICAL W/ AXILLARY LYMPH NODES W/ OR W/O  PECTORALIS MINOR           CURRENT MEDICATIONS       Current Facility-Administered Medications:     acetaminophen (TYLENOL) tablet 650 mg, 650 mg, Oral, Q6H PRN, Shaneka Palacios APRN, 650 mg at 07/27/25 0648    amLODIPine (NORVASC) tablet 10 mg, 10 mg, Oral, Daily, Sushma Arevalo MD, 10 mg at 07/28/25 0829    aspirin EC tablet 650 mg, 650 mg, Oral, BID, Kristofer Dong MD, 650 mg at 07/28/25 0829    sennosides-docusate (PERICOLACE) 8.6-50 MG per tablet 2 tablet, 2 tablet, Oral, BID PRN **AND** polyethylene glycol (MIRALAX) packet 17 g, 17 g, Oral, Daily PRN **AND** bisacodyl (DULCOLAX) EC tablet 5 mg, 5 mg, Oral, Daily PRN **AND** bisacodyl (DULCOLAX) suppository 10 mg, 10 mg, Rectal, Daily PRN, Sushma Arevalo MD    Calcium Replacement - Follow Nurse / BPA Driven Protocol, , Not Applicable, PRN, Sushma Arevalo MD    carvedilol (COREG) tablet 3.125 mg, 3.125 mg, Oral, BID With Meals, Sushma Arevalo MD, 3.125 mg at 07/28/25 0829    clopidogrel (PLAVIX) tablet 75 mg, 75 mg, Oral, Daily, Sushma Arevalo MD, 75 mg at 07/28/25 0829    colchicine tablet 0.6 mg, 0.6 mg, Oral, Q12H, Kristofer Dong MD, 0.6 mg at 07/28/25 0829    dextrose (D50W) (25 g/50 mL) IV injection 25 g, 25 g, Intravenous, Q15 Min PRN, Sushma Arevalo MD    dextrose (GLUTOSE) oral gel 15 g, 15 g, Oral, Q15 Min PRN, Sushma Arevalo MD    famotidine (PEPCID) tablet 20 mg, 20 mg, Oral, BID, Sushma Arevalo MD, 20 mg at 07/28/25 0829    FLUoxetine (PROzac) capsule 10 mg, 10 mg, Oral, Daily, Sushma Arevalo MD, 10 mg at 07/28/25 0828    glucagon (GLUCAGEN) injection 1 mg, 1 mg, Intramuscular, Q15 Min PRN, Sushma Arevalo MD    Insulin Lispro (humaLOG) injection 2-7 Units, 2-7 Units, Subcutaneous, 4x Daily AC & at Bedtime, Sushma Arevalo MD, 2 Units at 07/27/25 2117    ipratropium-albuterol (DUO-NEB) nebulizer solution 3 mL, 3 mL, Nebulization, Q4H PRN, Sushma Arevalo MD, 3 mL at 07/27/25 0427    losartan (COZAAR) tablet 25 mg, 25 mg,  Oral, Q24H, Sushma Arevalo MD, 25 mg at 07/28/25 0829    Magnesium Cardiology Dose Replacement - Follow Nurse / BPA Driven Protocol, , Not Applicable, PRN, Sushma Arevalo MD    morphine injection 1 mg, 1 mg, Intravenous, Q4H PRN, 1 mg at 07/28/25 1117 **AND** naloxone (NARCAN) injection 0.4 mg, 0.4 mg, Intravenous, Q5 Min PRN, Sushma Arevalo MD    nicotine (NICODERM CQ) 14 MG/24HR patch 1 patch, 1 patch, Transdermal, Q24H, Sushma Arevalo MD, 1 patch at 07/27/25 2119    nicotine polacrilex (NICORETTE) gum 2 mg, 2 mg, Mouth/Throat, Q1H PRN, Sushma Arevalo MD    nitroglycerin (NITROSTAT) SL tablet 0.4 mg, 0.4 mg, Sublingual, Q5 Min PRN, Sushma Arevalo MD    ondansetron (ZOFRAN) injection 4 mg, 4 mg, Intravenous, Q6H PRN, Sushma Arevalo MD    Phosphorus Replacement - Follow Nurse / BPA Driven Protocol, , Not Applicable, PRN, Sushma Arevalo MD    Potassium Replacement - Follow Nurse / BPA Driven Protocol, , Not Applicable, PRIrina AGUIRRE Jennifer, MD    rosuvastatin (CRESTOR) tablet 20 mg, 20 mg, Oral, Daily, Sushma Arevalo MD, 20 mg at 07/28/25 0829    sodium chloride 0.9 % flush 10 mL, 10 mL, Intravenous, PRN, Chris Hernandez MD, 10 mL at 07/28/25 0829    traZODone (DESYREL) tablet 100 mg, 100 mg, Oral, Nightly PRN, Sushma Arevalo MD, 100 mg at 07/28/25 0002    ALLERGIES     Penicillins    FAMILY HISTORY       Family History   Problem Relation Age of Onset    Hypertension Mother     Hyperlipidemia Mother     Alcohol abuse Mother     Arthritis Mother     Alcohol abuse Father     Heart disease Maternal Grandmother           SOCIAL HISTORY       Social History     Socioeconomic History    Marital status:    Tobacco Use    Smoking status: Some Days     Current packs/day: 0.50     Types: Cigarettes    Smokeless tobacco: Never   Vaping Use    Vaping status: Never Used   Substance and Sexual Activity    Alcohol use: No    Drug use: Not Currently     Types: Methamphetamines, Oxycodone,  Heroin, Hydrocodone, Hydromorphone, Ketamine, Morphine, Fentanyl, Marijuana     Comment: sobriety date 12/22/2022    Sexual activity: Defer         PHYSICAL EXAM    (up to 7 for level 4, 8 or more for level 5)     Vitals:    07/27/25 2359 07/28/25 0404 07/28/25 0828 07/28/25 1138   BP: 116/78 94/68 110/93 106/72   BP Location: Left arm Left arm Left arm Left arm   Patient Position: Lying Lying Sitting Lying   Pulse: 101   91   Resp: 16 16 18 18   Temp: 98.2 °F (36.8 °C) 98.5 °F (36.9 °C) 99.5 °F (37.5 °C) 98.6 °F (37 °C)   TempSrc: Oral Oral Axillary Oral   SpO2: 94%   94%   Weight:       Height:           General: Awake, alert  HEENT: Conjunctivae normal.  Neck: Trachea midline.  Cardiac: Tachycardic, regular rhythm, no murmurs, rubs, or gallops  Lungs: Lungs are clear to auscultation, there is no wheezing, rhonchi, or rales. There is no use of accessory muscles.  Abdomen: Abdomen is soft, nontender, nondistended. There are no firm or pulsatile masses, no rebound rigidity or guarding.   Musculoskeletal: No deformity.  Neuro: Alert   Dermatology: Skin is warm and dry  Psych: Mentation is grossly normal, cognition is grossly normal. Affect is appropriate.        DIAGNOSTIC RESULTS     EKG: All EKGs are interpreted by the Emergency Department Physician who either signs or Co-signs this chart in the absence of a cardiologist.    ECG 12 Lead Dyspnea   Preliminary Result   Test Reason : Dyspnea   Blood Pressure :   */*   mmHG   Vent. Rate :  99 BPM     Atrial Rate :  99 BPM      P-R Int : 134 ms          QRS Dur :  82 ms       QT Int : 342 ms       P-R-T Axes :  35   5  73 degrees     QTcB Int : 438 ms      Normal sinus rhythm   Possible Left atrial enlargement   Nonspecific T wave abnormality   Abnormal ECG   When compared with ECG of 26-Jul-2025 14:34, (Unconfirmed)   Nonspecific T wave abnormality has replaced inverted T waves in Lateral   leads      Referred By: EDMD           Confirmed By:       Telemetry Scan    Final Result      ECG 12 Lead Dyspnea   Preliminary Result   Test Reason : CP   Blood Pressure :   */*   mmHG   Vent. Rate : 120 BPM     Atrial Rate : 120 BPM      P-R Int : 126 ms          QRS Dur :  92 ms       QT Int : 308 ms       P-R-T Axes :  34   7 123 degrees     QTcB Int : 435 ms      Sinus tachycardia   Possible Left atrial enlargement   ST & T wave abnormality, consider lateral ischemia   Abnormal ECG   When compared with ECG of 12-Jul-2025 06:55,   ST no longer elevated in Lateral leads   T wave inversion now evident in Lateral leads      Referred By: ED MD           Confirmed By:             RADIOLOGY:   [x] Radiologist's Report Reviewed:  XR Abdomen KUB   Final Result   Impression:   No evidence of bowel obstruction or free intraperitoneal gas.            Electronically Signed: Vlad Baxter MD     7/27/2025 8:29 AM EDT     Workstation ID: [id number]      CT Angiogram Chest Pulmonary Embolism   Final Result   1. Large pericardial effusion. No pulmonary embolus.   2. Lobulated right apical opacity during 2.6 cm x 2.6 cm concerning for a possible mass. PET/CT and/or biopsy recommended.         Electronically Signed: Kane Villeda MD     7/26/2025 7:30 PM EDT     Workstation ID: [id number]      XR Chest 1 View   Final Result   Impression:   1.Streaky left basilar airspace opacity which could represent atelectasis or pneumonia.   2.Bandlike atelectasis within the left midlung.            Electronically Signed: Padilla Reyes MD     7/26/2025 3:59 PM EDT     Workstation ID: [id number]          I ordered and independently reviewed the above noted radiographic studies.        LABS:    I have reviewed and interpreted all of the currently available lab results from this visit (if applicable):  Results for orders placed or performed during the hospital encounter of 07/26/25   ECG 12 Lead Dyspnea    Collection Time: 07/26/25  2:34 PM   Result Value Ref Range    QT Interval 308 ms    QTC Interval 435 ms   Blood  Culture - Blood, Arm, Left    Collection Time: 07/26/25  5:05 PM    Specimen: Arm, Left; Blood   Result Value Ref Range    Blood Culture No growth at 24 hours    Blood Culture - Blood, Arm, Right    Collection Time: 07/26/25  5:05 PM    Specimen: Arm, Right; Blood   Result Value Ref Range    Blood Culture No growth at 24 hours    High Sensitivity Troponin T    Collection Time: 07/26/25  5:05 PM    Specimen: Blood   Result Value Ref Range    HS Troponin T 9 <22 ng/L   Comprehensive Metabolic Panel    Collection Time: 07/26/25  5:05 PM    Specimen: Blood   Result Value Ref Range    Glucose 126 (H) 65 - 99 mg/dL    BUN 8.7 6.0 - 20.0 mg/dL    Creatinine 0.98 0.76 - 1.27 mg/dL    Sodium 136 136 - 145 mmol/L    Potassium 3.9 3.5 - 5.2 mmol/L    Chloride 99 98 - 107 mmol/L    CO2 23.5 22.0 - 29.0 mmol/L    Calcium 9.8 8.6 - 10.5 mg/dL    Total Protein 8.6 (H) 6.0 - 8.5 g/dL    Albumin 4.3 3.5 - 5.2 g/dL    ALT (SGPT) 19 1 - 41 U/L    AST (SGOT) 24 1 - 40 U/L    Alkaline Phosphatase 114 39 - 117 U/L    Total Bilirubin 0.9 0.0 - 1.2 mg/dL    Globulin 4.3 gm/dL    A/G Ratio 1.0 g/dL    BUN/Creatinine Ratio 8.9 7.0 - 25.0    Anion Gap 13.5 5.0 - 15.0 mmol/L    eGFR 93.9 >60.0 mL/min/1.73   Lipase    Collection Time: 07/26/25  5:05 PM    Specimen: Blood   Result Value Ref Range    Lipase 18 13 - 60 U/L   BNP    Collection Time: 07/26/25  5:05 PM    Specimen: Blood   Result Value Ref Range    proBNP 190.0 0.0 - 900.0 pg/mL   CBC Auto Differential    Collection Time: 07/26/25  5:05 PM    Specimen: Blood   Result Value Ref Range    WBC 16.52 (H) 3.40 - 10.80 10*3/mm3    RBC 5.20 4.14 - 5.80 10*6/mm3    Hemoglobin 15.1 13.0 - 17.7 g/dL    Hematocrit 45.0 37.5 - 51.0 %    MCV 86.5 79.0 - 97.0 fL    MCH 29.0 26.6 - 33.0 pg    MCHC 33.6 31.5 - 35.7 g/dL    RDW 11.4 (L) 12.3 - 15.4 %    RDW-SD 36.1 (L) 37.0 - 54.0 fl    MPV 9.4 6.0 - 12.0 fL    Platelets 599 (H) 140 - 450 10*3/mm3    Neutrophil % 79.4 (H) 42.7 - 76.0 %    Lymphocyte %  9.6 (L) 19.6 - 45.3 %    Monocyte % 9.5 5.0 - 12.0 %    Eosinophil % 0.7 0.3 - 6.2 %    Basophil % 0.4 0.0 - 1.5 %    Immature Grans % 0.4 0.0 - 0.5 %    Neutrophils, Absolute 13.11 (H) 1.70 - 7.00 10*3/mm3    Lymphocytes, Absolute 1.59 0.70 - 3.10 10*3/mm3    Monocytes, Absolute 1.57 (H) 0.10 - 0.90 10*3/mm3    Eosinophils, Absolute 0.12 0.00 - 0.40 10*3/mm3    Basophils, Absolute 0.07 0.00 - 0.20 10*3/mm3    Immature Grans, Absolute 0.06 (H) 0.00 - 0.05 10*3/mm3    nRBC 0.0 0.0 - 0.2 /100 WBC   Procalcitonin    Collection Time: 07/26/25  5:05 PM    Specimen: Blood   Result Value Ref Range    Procalcitonin 0.06 0.00 - 0.25 ng/mL   D-dimer, Quantitative    Collection Time: 07/26/25  5:05 PM    Specimen: Blood   Result Value Ref Range    D-Dimer, Quantitative 5.75 (H) 0.00 - 0.50 MCGFEU/mL   Green Top (Gel)    Collection Time: 07/26/25  5:05 PM   Result Value Ref Range    Extra Tube Hold for add-ons.    Lavender Top    Collection Time: 07/26/25  5:05 PM   Result Value Ref Range    Extra Tube hold for add-on    Gold Top - SST    Collection Time: 07/26/25  5:05 PM   Result Value Ref Range    Extra Tube Hold for add-ons.    Gray Top    Collection Time: 07/26/25  5:05 PM   Result Value Ref Range    Extra Tube Hold for add-ons.    Light Blue Top    Collection Time: 07/26/25  5:05 PM   Result Value Ref Range    Extra Tube Hold for add-ons.    Respiratory Panel PCR w/COVID-19(SARS-CoV-2) [facility] In-House, NP Swab in UTM/VTM, 2 HR TAT - Swab, Nasopharynx    Collection Time: 07/26/25  5:21 PM    Specimen: Nasopharynx; Swab   Result Value Ref Range    ADENOVIRUS, PCR Not Detected Not Detected    Coronavirus 229E Not Detected Not Detected    Coronavirus HKU1 Not Detected Not Detected    Coronavirus NL63 Not Detected Not Detected    Coronavirus OC43 Not Detected Not Detected    COVID19 Not Detected Not Detected - Ref. Range    Human Metapneumovirus Not Detected Not Detected    Human Rhinovirus/Enterovirus Not  Detected Not Detected    Influenza A PCR Not Detected Not Detected    Influenza B PCR Not Detected Not Detected    Parainfluenza Virus 1 Not Detected Not Detected    Parainfluenza Virus 2 Not Detected Not Detected    Parainfluenza Virus 3 Not Detected Not Detected    Parainfluenza Virus 4 Not Detected Not Detected    RSV, PCR Not Detected Not Detected    Bordetella pertussis pcr Not Detected Not Detected    Bordetella parapertussis PCR Not Detected Not Detected    Chlamydophila pneumoniae PCR Not Detected Not Detected    Mycoplasma pneumo by PCR Not Detected Not Detected   High Sensitivity Troponin T 1Hr    Collection Time: 07/26/25  6:09 PM    Specimen: Blood   Result Value Ref Range    HS Troponin T 10 <22 ng/L    Troponin T Numeric Delta 1 Abnormal if >/=3 ng/L   Magnesium    Collection Time: 07/26/25  6:09 PM    Specimen: Blood   Result Value Ref Range    Magnesium 2.2 1.6 - 2.6 mg/dL   Phosphorus    Collection Time: 07/26/25  6:09 PM    Specimen: Blood   Result Value Ref Range    Phosphorus 3.2 2.5 - 4.5 mg/dL   BNP    Collection Time: 07/26/25  6:09 PM    Specimen: Blood   Result Value Ref Range    proBNP 223.0 0.0 - 900.0 pg/mL   ECG 12 Lead Dyspnea    Collection Time: 07/26/25  6:58 PM   Result Value Ref Range    QT Interval 342 ms    QTC Interval 438 ms   MRSA Screen, PCR (Inpatient) - Swab, Nares    Collection Time: 07/26/25 10:22 PM    Specimen: Nares; Swab   Result Value Ref Range    MRSA PCR Negative Negative   High Sensitivity Troponin T    Collection Time: 07/26/25 10:39 PM    Specimen: Blood   Result Value Ref Range    HS Troponin T 43 (H) <22 ng/L   Lactic Acid, Plasma    Collection Time: 07/26/25 10:39 PM    Specimen: Blood   Result Value Ref Range    Lactate 0.7 0.5 - 2.0 mmol/L   Ethanol    Collection Time: 07/26/25 10:39 PM    Specimen: Blood   Result Value Ref Range    Ethanol <10 0 - 10 mg/dL   Blood Gas, Arterial With Co-Ox    Collection Time: 07/26/25 11:58 PM    Specimen: Arterial Blood    Result Value Ref Range    Site Right Radial     Jarrod's Test N/A     pH, Arterial 7.438 7.350 - 7.450 pH units    pCO2, Arterial 35.2 35.0 - 45.0 mm Hg    pO2, Arterial 74.1 (L) 83.0 - 108.0 mm Hg    HCO3, Arterial 23.8 20.0 - 26.0 mmol/L    Base Excess, Arterial 0.0 0.0 - 2.0 mmol/L    Hemoglobin, Blood Gas 12.4 (L) 13.5 - 17.5 g/dL    Hematocrit, Blood Gas 38.1 38.0 - 51.0 %    Oxyhemoglobin 94.4 94 - 99 %    Methemoglobin 0.30 0.00 - 1.50 %    Carboxyhemoglobin 1.6 0 - 2 %    CO2 Content 24.9 22 - 33 mmol/L    Temperature 37.0     Barometric Pressure for Blood Gas      Modality Room Air     FIO2 21 %    Ventilator Mode      pH, Temp Corrected 7.438 pH Units    pCO2, Temperature Corrected 35.2 35 - 48 mm Hg    pO2, Temperature Corrected 74.1 (L) 83 - 108 mm Hg   Urinalysis With Microscopic If Indicated (No Culture) - Urine, Clean Catch    Collection Time: 07/27/25  1:01 AM    Specimen: Urine, Clean Catch   Result Value Ref Range    Color, UA Orange (A) Yellow, Straw    Appearance, UA Cloudy (A) Clear    pH, UA 5.5 5.0 - 8.0    Specific Gravity, UA >1.030 (H) 1.005 - 1.030    Glucose, UA Negative Negative    Ketones, UA Negative Negative    Bilirubin, UA Small (1+) (A) Negative    Blood, UA Trace (A) Negative    Protein, UA 30 mg/dL (1+) (A) Negative    Leuk Esterase, UA Trace (A) Negative    Nitrite, UA Negative Negative    Urobilinogen, UA 1.0 E.U./dL 0.2 - 1.0 E.U./dL   Urine Drug Screen - Urine, Clean Catch    Collection Time: 07/27/25  1:01 AM    Specimen: Urine, Clean Catch   Result Value Ref Range    THC, Screen, Urine Negative Negative    Phencyclidine (PCP), Urine Negative Negative    Cocaine Screen, Urine Negative Negative    Methamphetamine, Ur Negative Negative    Opiate Screen Positive (A) Negative    Amphetamine Screen, Urine Negative Negative    Benzodiazepine Screen, Urine Positive (A) Negative    Tricyclic Antidepressants Screen Negative Negative    Methadone Screen, Urine Negative Negative     Barbiturates Screen, Urine Negative Negative    Oxycodone Screen, Urine Negative Negative    Buprenorphine, Screen, Urine Negative Negative   Fentanyl, Urine - Urine, Clean Catch    Collection Time: 07/27/25  1:01 AM    Specimen: Urine, Clean Catch   Result Value Ref Range    Fentanyl, Urine Negative Negative   Urinalysis, Microscopic Only - Urine, Clean Catch    Collection Time: 07/27/25  1:01 AM    Specimen: Urine, Clean Catch   Result Value Ref Range    RBC, UA 3-5 (A) None Seen, 0-2 /HPF    WBC, UA 0-2 None Seen, 0-2 /HPF    Bacteria, UA None Seen None Seen /HPF    Squamous Epithelial Cells, UA 0-2 None Seen, 0-2 /HPF    Hyaline Casts, UA None Seen None Seen /LPF    Methodology Automated Microscopy    Comprehensive Metabolic Panel    Collection Time: 07/27/25  4:18 AM    Specimen: Blood   Result Value Ref Range    Glucose 73 65 - 99 mg/dL    BUN 8.9 6.0 - 20.0 mg/dL    Creatinine 0.75 (L) 0.76 - 1.27 mg/dL    Sodium 137 136 - 145 mmol/L    Potassium 3.9 3.5 - 5.2 mmol/L    Chloride 106 98 - 107 mmol/L    CO2 18.9 (L) 22.0 - 29.0 mmol/L    Calcium 8.4 (L) 8.6 - 10.5 mg/dL    Total Protein 6.6 6.0 - 8.5 g/dL    Albumin 3.3 (L) 3.5 - 5.2 g/dL    ALT (SGPT) 15 1 - 41 U/L    AST (SGOT) 20 1 - 40 U/L    Alkaline Phosphatase 85 39 - 117 U/L    Total Bilirubin 0.5 0.0 - 1.2 mg/dL    Globulin 3.3 gm/dL    A/G Ratio 1.0 g/dL    BUN/Creatinine Ratio 11.9 7.0 - 25.0    Anion Gap 12.1 5.0 - 15.0 mmol/L    eGFR 109.9 >60.0 mL/min/1.73   Lactic Acid, Plasma    Collection Time: 07/27/25  4:18 AM    Specimen: Blood   Result Value Ref Range    Lactate 0.9 0.5 - 2.0 mmol/L   Hemoglobin A1c    Collection Time: 07/27/25  4:18 AM    Specimen: Blood   Result Value Ref Range    Hemoglobin A1C 5.41 4.80 - 5.60 %   Lipid Panel    Collection Time: 07/27/25  4:18 AM    Specimen: Blood   Result Value Ref Range    Total Cholesterol 95 0 - 200 mg/dL    Triglycerides 61 0 - 150 mg/dL    HDL Cholesterol 25 (L) 40 - 60 mg/dL    LDL Cholesterol   56 0 - 100 mg/dL    VLDL Cholesterol 14 5 - 40 mg/dL    LDL/HDL Ratio 2.31    Magnesium    Collection Time: 07/27/25  4:18 AM    Specimen: Blood   Result Value Ref Range    Magnesium 2.2 1.6 - 2.6 mg/dL   Phosphorus    Collection Time: 07/27/25  4:18 AM    Specimen: Blood   Result Value Ref Range    Phosphorus 2.5 2.5 - 4.5 mg/dL   Procalcitonin    Collection Time: 07/27/25  4:18 AM    Specimen: Blood   Result Value Ref Range    Procalcitonin 0.09 0.00 - 0.25 ng/mL   High Sensitivity Troponin T    Collection Time: 07/27/25  4:18 AM    Specimen: Blood   Result Value Ref Range    HS Troponin T 10 <22 ng/L   Heparin Anti-Xa    Collection Time: 07/27/25  4:18 AM    Specimen: Blood   Result Value Ref Range    Heparin Anti-Xa (UFH) 0.10 (L) 0.30 - 0.70 IU/ml   Protime-INR    Collection Time: 07/27/25  4:18 AM    Specimen: Blood   Result Value Ref Range    Protime 16.4 (H) 12.2 - 15.3 Seconds    INR 1.25 (H) 0.89 - 1.12   aPTT    Collection Time: 07/27/25  4:18 AM    Specimen: Blood   Result Value Ref Range    PTT 36.3 (L) 60.0 - 90.0 seconds   CBC Auto Differential    Collection Time: 07/27/25  4:18 AM    Specimen: Blood   Result Value Ref Range    WBC 11.44 (H) 3.40 - 10.80 10*3/mm3    RBC 4.30 4.14 - 5.80 10*6/mm3    Hemoglobin 12.4 (L) 13.0 - 17.7 g/dL    Hematocrit 37.8 37.5 - 51.0 %    MCV 87.9 79.0 - 97.0 fL    MCH 28.8 26.6 - 33.0 pg    MCHC 32.8 31.5 - 35.7 g/dL    RDW 11.5 (L) 12.3 - 15.4 %    RDW-SD 36.7 (L) 37.0 - 54.0 fl    MPV 9.4 6.0 - 12.0 fL    Platelets 431 140 - 450 10*3/mm3    Neutrophil % 69.5 42.7 - 76.0 %    Lymphocyte % 13.5 (L) 19.6 - 45.3 %    Monocyte % 13.4 (H) 5.0 - 12.0 %    Eosinophil % 2.6 0.3 - 6.2 %    Basophil % 0.5 0.0 - 1.5 %    Immature Grans % 0.5 0.0 - 0.5 %    Neutrophils, Absolute 7.95 (H) 1.70 - 7.00 10*3/mm3    Lymphocytes, Absolute 1.54 0.70 - 3.10 10*3/mm3    Monocytes, Absolute 1.53 (H) 0.10 - 0.90 10*3/mm3    Eosinophils, Absolute 0.30 0.00 - 0.40 10*3/mm3    Basophils,  Absolute 0.06 0.00 - 0.20 10*3/mm3    Immature Grans, Absolute 0.06 (H) 0.00 - 0.05 10*3/mm3    nRBC 0.0 0.0 - 0.2 /100 WBC   C-reactive Protein    Collection Time: 07/27/25  4:18 AM    Specimen: Blood   Result Value Ref Range    C-Reactive Protein 18.30 (H) 0.00 - 0.50 mg/dL   Sedimentation Rate    Collection Time: 07/27/25  4:18 AM    Specimen: Blood   Result Value Ref Range    Sed Rate 51 (H) 0 - 20 mm/hr   Blood Culture - Blood, Arm, Left    Collection Time: 07/27/25  6:51 AM    Specimen: Arm, Left; Blood   Result Value Ref Range    Blood Culture No growth at 24 hours    Blood Culture - Blood, Arm, Right    Collection Time: 07/27/25  6:51 AM    Specimen: Arm, Right; Blood   Result Value Ref Range    Blood Culture No growth at 24 hours    POC Glucose Once    Collection Time: 07/27/25  7:26 AM    Specimen: Blood   Result Value Ref Range    Glucose 110 70 - 130 mg/dL   Adult Transthoracic Echo Limited W/ Cont if Necessary Per Protocol    Collection Time: 07/27/25  8:35 AM   Result Value Ref Range    EF(MOD-bp) 56.1 %    LVIDd 5.1 cm    LVIDs 3.3 cm    IVSd 1.00 cm    LVPWd 1.00 cm    FS 35.3 %    IVS/LVPW 1.00 cm    ESV(cubed) 35.9 ml    EDV(cubed) 132.7 ml    LV mass(C)d 188.0 grams    EDV(MOD-sp2) 83.3 ml    EDV(MOD-sp4) 105.0 ml    ESV(MOD-sp2) 34.4 ml    ESV(MOD-sp4) 46.4 ml    SV(MOD-sp2) 48.9 ml    SV(MOD-sp4) 58.6 ml    EF(MOD-sp2) 58.7 %    EF(MOD-sp4) 55.8 %    TR max kaushik 214.0 cm/sec    LA dimension (2D)  3.9 cm    AI P1/2t 479.0 msec    TR max PG 18.3 mmHg    RVSP(TR) 21 mmHg    RAP systole 3 mmHg   Duplex Venous Lower Extremity - Bilateral CAR    Collection Time: 07/27/25  9:50 AM   Result Value Ref Range    Right Common Femoral Spont Y     Right Common Femoral Phasic Y     Right Common Femoral Compress C     Right Common Femoral Augment Y     Right Saphenofemoral Junction Spont Y     Right Saphenofemoral Junction Phasic Y     Right Saphenofemoral Junction Compress C     Right Saphenofemoral Junction  Augment Y     Right Profunda Femoral Compress C     Right Proximal Femoral Spont Y     Right Proximal Femoral Phasic Y     Right Proximal Femoral Compress C     Right Proximal Femoral Augment Y     Right Mid Femoral Spont Y     Right Mid Femoral Phasic Y     Right Mid Femoral Compress C     Right Mid Femoral Augment Y     Right Distal Femoral Spont Y     Right Distal Femoral Phasic Y     Right Distal Femoral Compress C     Right Distal Femoral Augment Y     Right Popliteal Spont Y     Right Popliteal Phasic Y     Right Popliteal Compress C     Right Popliteal Augment Y     Right Posterior Tibial Compress C     Right Peroneal Compress C     Right Gastronemius Compress C     Right Greater Saph AK Compress C     Right Greater Saph BK Compress C     Right Lesser Saph Compress C     Left Common Femoral Spont Y     Left Common Femoral Phasic Y     Left Common Femoral Compress C     Left Common Femoral Augment Y     Left Saphenofemoral Junction Spont Y     Left Saphenofemoral Junction Phasic Y     Left Saphenofemoral Junction Compress C     Left Saphenofemoral Junction Augment Y     Left Profunda Femoral Compress C     Left Proximal Femoral Spont Y     Left Proximal Femoral Phasic Y     Left Proximal Femoral Compress C     Left Proximal Femoral Augment Y     Left Mid Femoral Spont Y     Left Mid Femoral Phasic Y     Left Mid Femoral Compress C     Left Mid Femoral Augment Y     Left Distal Femoral Spont Y     Left Distal Femoral Phasic Y     Left Distal Femoral Compress C     Left Distal Femoral Augment Y     Left Popliteal Spont Y     Left Popliteal Phasic Y     Left Popliteal Compress C     Left Popliteal Augment Y     Left Posterior Tibial Compress C     Left Peroneal Compress C     Left Gastronemius Compress C     Left Greater Saph AK Compress C     Left Greater Saph BK Compress C     Left Lesser Saph Compress C    POC Glucose Once    Collection Time: 07/27/25 11:26 AM    Specimen: Blood   Result Value Ref Range     Glucose 140 (H) 70 - 130 mg/dL   POC Glucose Once    Collection Time: 07/27/25  4:37 PM    Specimen: Blood   Result Value Ref Range    Glucose 84 70 - 130 mg/dL   POC Glucose Once    Collection Time: 07/27/25  7:57 PM    Specimen: Blood   Result Value Ref Range    Glucose 158 (H) 70 - 130 mg/dL   Basic Metabolic Panel    Collection Time: 07/28/25  4:21 AM    Specimen: Blood   Result Value Ref Range    Glucose 89 65 - 99 mg/dL    BUN 6.3 6.0 - 20.0 mg/dL    Creatinine 0.88 0.76 - 1.27 mg/dL    Sodium 139 136 - 145 mmol/L    Potassium 4.4 3.5 - 5.2 mmol/L    Chloride 104 98 - 107 mmol/L    CO2 23.0 22.0 - 29.0 mmol/L    Calcium 9.2 8.6 - 10.5 mg/dL    BUN/Creatinine Ratio 7.2 7.0 - 25.0    Anion Gap 12.0 5.0 - 15.0 mmol/L    eGFR 104.8 >60.0 mL/min/1.73   CBC Auto Differential    Collection Time: 07/28/25  4:21 AM    Specimen: Blood   Result Value Ref Range    WBC 11.74 (H) 3.40 - 10.80 10*3/mm3    RBC 4.35 4.14 - 5.80 10*6/mm3    Hemoglobin 12.7 (L) 13.0 - 17.7 g/dL    Hematocrit 38.2 37.5 - 51.0 %    MCV 87.8 79.0 - 97.0 fL    MCH 29.2 26.6 - 33.0 pg    MCHC 33.2 31.5 - 35.7 g/dL    RDW 11.4 (L) 12.3 - 15.4 %    RDW-SD 37.1 37.0 - 54.0 fl    MPV 9.3 6.0 - 12.0 fL    Platelets 478 (H) 140 - 450 10*3/mm3    Neutrophil % 66.7 42.7 - 76.0 %    Lymphocyte % 16.8 (L) 19.6 - 45.3 %    Monocyte % 12.7 (H) 5.0 - 12.0 %    Eosinophil % 2.8 0.3 - 6.2 %    Basophil % 0.6 0.0 - 1.5 %    Immature Grans % 0.4 0.0 - 0.5 %    Neutrophils, Absolute 7.83 (H) 1.70 - 7.00 10*3/mm3    Lymphocytes, Absolute 1.97 0.70 - 3.10 10*3/mm3    Monocytes, Absolute 1.49 (H) 0.10 - 0.90 10*3/mm3    Eosinophils, Absolute 0.33 0.00 - 0.40 10*3/mm3    Basophils, Absolute 0.07 0.00 - 0.20 10*3/mm3    Immature Grans, Absolute 0.05 0.00 - 0.05 10*3/mm3    nRBC 0.0 0.0 - 0.2 /100 WBC   POC Glucose Once    Collection Time: 07/28/25  7:50 AM    Specimen: Blood   Result Value Ref Range    Glucose 90 70 - 130 mg/dL   POC Glucose Once    Collection Time:  07/28/25 11:38 AM    Specimen: Blood   Result Value Ref Range    Glucose 91 70 - 130 mg/dL        If labs were ordered, I independently reviewed the results and considered them in treating the patient.      EMERGENCY DEPARTMENT COURSE and DIFFERENTIAL DIAGNOSIS/MDM:   Vitals:  AS OF 11:50 EDT    BP - 106/72  HR - 91  TEMP - 98.6 °F (37 °C) (Oral)  O2 SATS - 94%        Discussion below represents my analysis of pertinent findings related to patient's condition, differential diagnosis, treatment plan and final disposition.      Differential diagnosis:  The differential diagnosis associated with the patient's presentation includes: ACS, PNA, PTX, PE, dissection      Independent interpretations (ECG/rhythm strip/X-ray/US/CT scan): I independently interpreted the pt CTA chest and cardiac monitor - there is no PE, the patient is in sinus tach      Additional sources:  Discussed/obtained information from independent historians:   [] Spouse:   [] Parent:   [] Friend:   [] EMS:   [] Other:  External (non-ED) record review:   [] Inpatient record:   [] Office record:   [] Outpatient record:   [] Prior Outpatient labs:   [] Prior Outpatient radiology:   [] Primary Care record:   [] Outside ED record:   [x] Other: Reviewed documentation from recent admission - pt dx w pericarditis w effusion      Patient's care impacted by:   [] Diabetes   [x] Hypertension   [] Coronary Artery Disease   [] Cancer   [x] Other: Hx of hodgkin lymphoma, COPD    Care significantly affected by Social Determinants of Health (housing and economic circumstances, unemployment)    [] Yes     [x] No   If yes, Patient's care significantly limited by  Social Determinants of Health including:    [] Inadequate housing    [] Low income    [] Alcoholism and drug addiction in family    [] Problems related to primary support group    [] Unemployment    [] Problems related to employment    [] Other Social Determinants of Health:       Consideration of  admission/observation vs discharge: Pt w enlarging effusion, symptomatic, possible pna, warrants admission for further evaluation        ED Course:    ED Course as of 07/28/25 1150   Sat Jul 26, 2025   1438 Temp(!): 100.9 °F (38.3 °C) [NS]   1919 I think this kolby has pericarditis with pericardial effusion. He had some lower abdominal cramping and diarrhea earlier in the week. Over the last couple of days, has developed sharp positional chest pain that is worse with lying flat and is pleuritic. Febrile on arrival at 100.9, a little bit tachycardic but looks okay. He has a white count of 16 but labs look okay otherwise. CTA chest negative for PE, does have a pericardial effusion. No evidence of tamponade on bedside echo, he has full collapse of his IVC with respiration. He may have some subtle infiltrate in his left lung and has had some dry cough. Currently, HR 98 and BP at 7:08 was 118/82 [NS]   1919 I discussed case with hospitalist Dr. Mejias.  Discussed history, presentation, workup.  Accepts patient for admission. [NS]   2019 I spoke w cardiology Dr. Mchugh. Recs starting the patient on indocin/colchicine. Echo, cards to see tomorrow. [NS]      ED Course User Index  [NS] Chris Hernandez MD           CRITICAL CARE TIME    Approximately 35 minutes of discontinuous critical care time was provided to this patient by myself absent of any time spent performing procedures.  Patient presents critically ill with acute sepsis 2/2 PNA along with enlarging pericardial effusion associated w pericarditis placing the CV, resp, neuro, renal systems at risk requiring the following interventions: IV fluid resus, IV abx, interpretation of labs/ecg/imaging, freq reassessment, coordination of admission.  Patient at high risk of deterioration and possibly death without these interventions.      FINAL IMPRESSION      1. Acute sepsis    2. Pneumonia due to infectious organism, unspecified laterality, unspecified part of lung    3.  Acute pericarditis, unspecified type    4. Pericardial effusion    5. Smoker          DISPOSITION/PLAN     ED Disposition       ED Disposition   Decision to Admit    Condition   --    Comment   Level of Care: Telemetry [5]   Diagnosis: Pericarditis [354435]   Admitting Physician: CHICO CORDERO [[id number]]   Certification: I Certify That Inpatient Hospital Services Are Medically Necessary For Greater Than 2 Midnights                   Comment: Please note this report has been produced using speech recognition software.      Chris Hernandez MD  Attending Emergency Physician             Chris Hernandez MD  07/28/25 4550

## 2025-07-27 ENCOUNTER — APPOINTMENT (OUTPATIENT)
Dept: CARDIOLOGY | Facility: HOSPITAL | Age: 50
End: 2025-07-27

## 2025-07-27 ENCOUNTER — APPOINTMENT (OUTPATIENT)
Dept: CARDIOLOGY | Facility: HOSPITAL | Age: 50
End: 2025-07-27
Payer: MEDICAID

## 2025-07-27 LAB
ALBUMIN SERPL-MCNC: 3.3 G/DL (ref 3.5–5.2)
ALBUMIN/GLOB SERPL: 1 G/DL
ALP SERPL-CCNC: 85 U/L (ref 39–117)
ALT SERPL W P-5'-P-CCNC: 15 U/L (ref 1–41)
AMPHET+METHAMPHET UR QL: NEGATIVE
AMPHETAMINES UR QL: NEGATIVE
ANION GAP SERPL CALCULATED.3IONS-SCNC: 12.1 MMOL/L (ref 5–15)
APTT PPP: 36.3 SECONDS (ref 60–90)
ARTERIAL PATENCY WRIST A: ABNORMAL
AST SERPL-CCNC: 20 U/L (ref 1–40)
ATMOSPHERIC PRESS: ABNORMAL MM[HG]
BACTERIA UR QL AUTO: ABNORMAL /HPF
BARBITURATES UR QL SCN: NEGATIVE
BASE EXCESS BLDA CALC-SCNC: 0 MMOL/L (ref 0–2)
BASOPHILS # BLD AUTO: 0.06 10*3/MM3 (ref 0–0.2)
BASOPHILS NFR BLD AUTO: 0.5 % (ref 0–1.5)
BDY SITE: ABNORMAL
BENZODIAZ UR QL SCN: POSITIVE
BH CV ECHO MEAS - AI P1/2T: 479 MSEC
BH CV ECHO MEAS - EDV(CUBED): 132.7 ML
BH CV ECHO MEAS - EDV(MOD-SP2): 83.3 ML
BH CV ECHO MEAS - EDV(MOD-SP4): 105 ML
BH CV ECHO MEAS - EF(MOD-SP2): 58.7 %
BH CV ECHO MEAS - EF(MOD-SP4): 55.8 %
BH CV ECHO MEAS - ESV(CUBED): 35.9 ML
BH CV ECHO MEAS - ESV(MOD-SP2): 34.4 ML
BH CV ECHO MEAS - ESV(MOD-SP4): 46.4 ML
BH CV ECHO MEAS - FS: 35.3 %
BH CV ECHO MEAS - IVS/LVPW: 1 CM
BH CV ECHO MEAS - IVSD: 1 CM
BH CV ECHO MEAS - LA DIMENSION: 3.9 CM
BH CV ECHO MEAS - LV MASS(C)D: 188 GRAMS
BH CV ECHO MEAS - LVIDD: 5.1 CM
BH CV ECHO MEAS - LVIDS: 3.3 CM
BH CV ECHO MEAS - LVPWD: 1 CM
BH CV ECHO MEAS - RAP SYSTOLE: 3 MMHG
BH CV ECHO MEAS - RVSP: 21 MMHG
BH CV ECHO MEAS - SV(MOD-SP2): 48.9 ML
BH CV ECHO MEAS - SV(MOD-SP4): 58.6 ML
BH CV ECHO MEAS - TR MAX PG: 18.3 MMHG
BH CV ECHO MEAS - TR MAX VEL: 214 CM/SEC
BH CV LOWER VASCULAR LEFT COMMON FEMORAL AUGMENT: NORMAL
BH CV LOWER VASCULAR LEFT COMMON FEMORAL COMPRESS: NORMAL
BH CV LOWER VASCULAR LEFT COMMON FEMORAL PHASIC: NORMAL
BH CV LOWER VASCULAR LEFT COMMON FEMORAL SPONT: NORMAL
BH CV LOWER VASCULAR LEFT DISTAL FEMORAL AUGMENT: NORMAL
BH CV LOWER VASCULAR LEFT DISTAL FEMORAL COMPRESS: NORMAL
BH CV LOWER VASCULAR LEFT DISTAL FEMORAL PHASIC: NORMAL
BH CV LOWER VASCULAR LEFT DISTAL FEMORAL SPONT: NORMAL
BH CV LOWER VASCULAR LEFT GASTRONEMIUS COMPRESS: NORMAL
BH CV LOWER VASCULAR LEFT GREATER SAPH AK COMPRESS: NORMAL
BH CV LOWER VASCULAR LEFT GREATER SAPH BK COMPRESS: NORMAL
BH CV LOWER VASCULAR LEFT LESSER SAPH COMPRESS: NORMAL
BH CV LOWER VASCULAR LEFT MID FEMORAL AUGMENT: NORMAL
BH CV LOWER VASCULAR LEFT MID FEMORAL COMPRESS: NORMAL
BH CV LOWER VASCULAR LEFT MID FEMORAL PHASIC: NORMAL
BH CV LOWER VASCULAR LEFT MID FEMORAL SPONT: NORMAL
BH CV LOWER VASCULAR LEFT PERONEAL COMPRESS: NORMAL
BH CV LOWER VASCULAR LEFT POPLITEAL AUGMENT: NORMAL
BH CV LOWER VASCULAR LEFT POPLITEAL COMPRESS: NORMAL
BH CV LOWER VASCULAR LEFT POPLITEAL PHASIC: NORMAL
BH CV LOWER VASCULAR LEFT POPLITEAL SPONT: NORMAL
BH CV LOWER VASCULAR LEFT POSTERIOR TIBIAL COMPRESS: NORMAL
BH CV LOWER VASCULAR LEFT PROFUNDA FEMORAL COMPRESS: NORMAL
BH CV LOWER VASCULAR LEFT PROXIMAL FEMORAL AUGMENT: NORMAL
BH CV LOWER VASCULAR LEFT PROXIMAL FEMORAL COMPRESS: NORMAL
BH CV LOWER VASCULAR LEFT PROXIMAL FEMORAL PHASIC: NORMAL
BH CV LOWER VASCULAR LEFT PROXIMAL FEMORAL SPONT: NORMAL
BH CV LOWER VASCULAR LEFT SAPHENOFEMORAL JUNCTION AUGMENT: NORMAL
BH CV LOWER VASCULAR LEFT SAPHENOFEMORAL JUNCTION COMPRESS: NORMAL
BH CV LOWER VASCULAR LEFT SAPHENOFEMORAL JUNCTION PHASIC: NORMAL
BH CV LOWER VASCULAR LEFT SAPHENOFEMORAL JUNCTION SPONT: NORMAL
BH CV LOWER VASCULAR RIGHT COMMON FEMORAL AUGMENT: NORMAL
BH CV LOWER VASCULAR RIGHT COMMON FEMORAL COMPRESS: NORMAL
BH CV LOWER VASCULAR RIGHT COMMON FEMORAL PHASIC: NORMAL
BH CV LOWER VASCULAR RIGHT COMMON FEMORAL SPONT: NORMAL
BH CV LOWER VASCULAR RIGHT DISTAL FEMORAL AUGMENT: NORMAL
BH CV LOWER VASCULAR RIGHT DISTAL FEMORAL COMPRESS: NORMAL
BH CV LOWER VASCULAR RIGHT DISTAL FEMORAL PHASIC: NORMAL
BH CV LOWER VASCULAR RIGHT DISTAL FEMORAL SPONT: NORMAL
BH CV LOWER VASCULAR RIGHT GASTRONEMIUS COMPRESS: NORMAL
BH CV LOWER VASCULAR RIGHT GREATER SAPH AK COMPRESS: NORMAL
BH CV LOWER VASCULAR RIGHT GREATER SAPH BK COMPRESS: NORMAL
BH CV LOWER VASCULAR RIGHT LESSER SAPH COMPRESS: NORMAL
BH CV LOWER VASCULAR RIGHT MID FEMORAL AUGMENT: NORMAL
BH CV LOWER VASCULAR RIGHT MID FEMORAL COMPRESS: NORMAL
BH CV LOWER VASCULAR RIGHT MID FEMORAL PHASIC: NORMAL
BH CV LOWER VASCULAR RIGHT MID FEMORAL SPONT: NORMAL
BH CV LOWER VASCULAR RIGHT PERONEAL COMPRESS: NORMAL
BH CV LOWER VASCULAR RIGHT POPLITEAL AUGMENT: NORMAL
BH CV LOWER VASCULAR RIGHT POPLITEAL COMPRESS: NORMAL
BH CV LOWER VASCULAR RIGHT POPLITEAL PHASIC: NORMAL
BH CV LOWER VASCULAR RIGHT POPLITEAL SPONT: NORMAL
BH CV LOWER VASCULAR RIGHT POSTERIOR TIBIAL COMPRESS: NORMAL
BH CV LOWER VASCULAR RIGHT PROFUNDA FEMORAL COMPRESS: NORMAL
BH CV LOWER VASCULAR RIGHT PROXIMAL FEMORAL AUGMENT: NORMAL
BH CV LOWER VASCULAR RIGHT PROXIMAL FEMORAL COMPRESS: NORMAL
BH CV LOWER VASCULAR RIGHT PROXIMAL FEMORAL PHASIC: NORMAL
BH CV LOWER VASCULAR RIGHT PROXIMAL FEMORAL SPONT: NORMAL
BH CV LOWER VASCULAR RIGHT SAPHENOFEMORAL JUNCTION AUGMENT: NORMAL
BH CV LOWER VASCULAR RIGHT SAPHENOFEMORAL JUNCTION COMPRESS: NORMAL
BH CV LOWER VASCULAR RIGHT SAPHENOFEMORAL JUNCTION PHASIC: NORMAL
BH CV LOWER VASCULAR RIGHT SAPHENOFEMORAL JUNCTION SPONT: NORMAL
BILIRUB SERPL-MCNC: 0.5 MG/DL (ref 0–1.2)
BILIRUB UR QL STRIP: ABNORMAL
BODY TEMPERATURE: 37
BUN SERPL-MCNC: 8.9 MG/DL (ref 6–20)
BUN/CREAT SERPL: 11.9 (ref 7–25)
BUPRENORPHINE SERPL-MCNC: NEGATIVE NG/ML
CALCIUM SPEC-SCNC: 8.4 MG/DL (ref 8.6–10.5)
CANNABINOIDS SERPL QL: NEGATIVE
CHLORIDE SERPL-SCNC: 106 MMOL/L (ref 98–107)
CHOLEST SERPL-MCNC: 95 MG/DL (ref 0–200)
CLARITY UR: ABNORMAL
CO2 BLDA-SCNC: 24.9 MMOL/L (ref 22–33)
CO2 SERPL-SCNC: 18.9 MMOL/L (ref 22–29)
COCAINE UR QL: NEGATIVE
COHGB MFR BLD: 1.6 % (ref 0–2)
COLOR UR: ABNORMAL
CREAT SERPL-MCNC: 0.75 MG/DL (ref 0.76–1.27)
CRP SERPL-MCNC: 18.3 MG/DL (ref 0–0.5)
D-LACTATE SERPL-SCNC: 0.9 MMOL/L (ref 0.5–2)
DEPRECATED RDW RBC AUTO: 36.7 FL (ref 37–54)
EGFRCR SERPLBLD CKD-EPI 2021: 109.9 ML/MIN/1.73
EOSINOPHIL # BLD AUTO: 0.3 10*3/MM3 (ref 0–0.4)
EOSINOPHIL NFR BLD AUTO: 2.6 % (ref 0.3–6.2)
ERYTHROCYTE [DISTWIDTH] IN BLOOD BY AUTOMATED COUNT: 11.5 % (ref 12.3–15.4)
ERYTHROCYTE [SEDIMENTATION RATE] IN BLOOD: 51 MM/HR (ref 0–20)
FENTANYL UR-MCNC: NEGATIVE NG/ML
GLOBULIN UR ELPH-MCNC: 3.3 GM/DL
GLUCOSE BLDC GLUCOMTR-MCNC: 110 MG/DL (ref 70–130)
GLUCOSE BLDC GLUCOMTR-MCNC: 140 MG/DL (ref 70–130)
GLUCOSE BLDC GLUCOMTR-MCNC: 158 MG/DL (ref 70–130)
GLUCOSE BLDC GLUCOMTR-MCNC: 84 MG/DL (ref 70–130)
GLUCOSE SERPL-MCNC: 73 MG/DL (ref 65–99)
GLUCOSE UR STRIP-MCNC: NEGATIVE MG/DL
HBA1C MFR BLD: 5.41 % (ref 4.8–5.6)
HCO3 BLDA-SCNC: 23.8 MMOL/L (ref 20–26)
HCT VFR BLD AUTO: 37.8 % (ref 37.5–51)
HCT VFR BLD CALC: 38.1 % (ref 38–51)
HDLC SERPL-MCNC: 25 MG/DL (ref 40–60)
HGB BLD-MCNC: 12.4 G/DL (ref 13–17.7)
HGB BLDA-MCNC: 12.4 G/DL (ref 13.5–17.5)
HGB UR QL STRIP.AUTO: ABNORMAL
HYALINE CASTS UR QL AUTO: ABNORMAL /LPF
IMM GRANULOCYTES # BLD AUTO: 0.06 10*3/MM3 (ref 0–0.05)
IMM GRANULOCYTES NFR BLD AUTO: 0.5 % (ref 0–0.5)
INHALED O2 CONCENTRATION: 21 %
INR PPP: 1.25 (ref 0.89–1.12)
KETONES UR QL STRIP: NEGATIVE
LDLC SERPL CALC-MCNC: 56 MG/DL (ref 0–100)
LDLC/HDLC SERPL: 2.31 {RATIO}
LEUKOCYTE ESTERASE UR QL STRIP.AUTO: ABNORMAL
LV EF BIPLANE MOD: 56.1 %
LYMPHOCYTES # BLD AUTO: 1.54 10*3/MM3 (ref 0.7–3.1)
LYMPHOCYTES NFR BLD AUTO: 13.5 % (ref 19.6–45.3)
MAGNESIUM SERPL-MCNC: 2.2 MG/DL (ref 1.6–2.6)
MCH RBC QN AUTO: 28.8 PG (ref 26.6–33)
MCHC RBC AUTO-ENTMCNC: 32.8 G/DL (ref 31.5–35.7)
MCV RBC AUTO: 87.9 FL (ref 79–97)
METHADONE UR QL SCN: NEGATIVE
METHGB BLD QL: 0.3 % (ref 0–1.5)
MODALITY: ABNORMAL
MONOCYTES # BLD AUTO: 1.53 10*3/MM3 (ref 0.1–0.9)
MONOCYTES NFR BLD AUTO: 13.4 % (ref 5–12)
MRSA DNA SPEC QL NAA+PROBE: NEGATIVE
NEUTROPHILS NFR BLD AUTO: 69.5 % (ref 42.7–76)
NEUTROPHILS NFR BLD AUTO: 7.95 10*3/MM3 (ref 1.7–7)
NITRITE UR QL STRIP: NEGATIVE
NRBC BLD AUTO-RTO: 0 /100 WBC (ref 0–0.2)
OPIATES UR QL: POSITIVE
OXYCODONE UR QL SCN: NEGATIVE
OXYHGB MFR BLDV: 94.4 % (ref 94–99)
PCO2 BLDA: 35.2 MM HG (ref 35–45)
PCO2 TEMP ADJ BLD: 35.2 MM HG (ref 35–48)
PCP UR QL SCN: NEGATIVE
PH BLDA: 7.44 PH UNITS (ref 7.35–7.45)
PH UR STRIP.AUTO: 5.5 [PH] (ref 5–8)
PH, TEMP CORRECTED: 7.44 PH UNITS
PHOSPHATE SERPL-MCNC: 2.5 MG/DL (ref 2.5–4.5)
PLATELET # BLD AUTO: 431 10*3/MM3 (ref 140–450)
PMV BLD AUTO: 9.4 FL (ref 6–12)
PO2 BLDA: 74.1 MM HG (ref 83–108)
PO2 TEMP ADJ BLD: 74.1 MM HG (ref 83–108)
POTASSIUM SERPL-SCNC: 3.9 MMOL/L (ref 3.5–5.2)
PROCALCITONIN SERPL-MCNC: 0.09 NG/ML (ref 0–0.25)
PROT SERPL-MCNC: 6.6 G/DL (ref 6–8.5)
PROT UR QL STRIP: ABNORMAL
PROTHROMBIN TIME: 16.4 SECONDS (ref 12.2–15.3)
RBC # BLD AUTO: 4.3 10*6/MM3 (ref 4.14–5.8)
RBC # UR STRIP: ABNORMAL /HPF
REF LAB TEST METHOD: ABNORMAL
SODIUM SERPL-SCNC: 137 MMOL/L (ref 136–145)
SP GR UR STRIP: >1.03 (ref 1–1.03)
SQUAMOUS #/AREA URNS HPF: ABNORMAL /HPF
TRICYCLICS UR QL SCN: NEGATIVE
TRIGL SERPL-MCNC: 61 MG/DL (ref 0–150)
TROPONIN T SERPL HS-MCNC: 10 NG/L
UFH PPP CHRO-ACNC: 0.1 IU/ML (ref 0.3–0.7)
UROBILINOGEN UR QL STRIP: ABNORMAL
VENTILATOR MODE: ABNORMAL
VLDLC SERPL-MCNC: 14 MG/DL (ref 5–40)
WBC # UR STRIP: ABNORMAL /HPF
WBC NRBC COR # BLD AUTO: 11.44 10*3/MM3 (ref 3.4–10.8)

## 2025-07-27 PROCEDURE — 93325 DOPPLER ECHO COLOR FLOW MAPG: CPT | Performed by: INTERNAL MEDICINE

## 2025-07-27 PROCEDURE — 85025 COMPLETE CBC W/AUTO DIFF WBC: CPT | Performed by: INTERNAL MEDICINE

## 2025-07-27 PROCEDURE — 99253 IP/OBS CNSLTJ NEW/EST LOW 45: CPT | Performed by: INTERNAL MEDICINE

## 2025-07-27 PROCEDURE — 25010000002 HEPARIN (PORCINE) 25000-0.45 UT/250ML-% SOLUTION: Performed by: INTERNAL MEDICINE

## 2025-07-27 PROCEDURE — 93308 TTE F-UP OR LMTD: CPT | Performed by: INTERNAL MEDICINE

## 2025-07-27 PROCEDURE — 93325 DOPPLER ECHO COLOR FLOW MAPG: CPT

## 2025-07-27 PROCEDURE — 83036 HEMOGLOBIN GLYCOSYLATED A1C: CPT | Performed by: INTERNAL MEDICINE

## 2025-07-27 PROCEDURE — 87040 BLOOD CULTURE FOR BACTERIA: CPT | Performed by: INTERNAL MEDICINE

## 2025-07-27 PROCEDURE — 83735 ASSAY OF MAGNESIUM: CPT | Performed by: INTERNAL MEDICINE

## 2025-07-27 PROCEDURE — 80053 COMPREHEN METABOLIC PANEL: CPT | Performed by: INTERNAL MEDICINE

## 2025-07-27 PROCEDURE — 93321 DOPPLER ECHO F-UP/LMTD STD: CPT

## 2025-07-27 PROCEDURE — 25810000003 SODIUM CHLORIDE 0.9 % SOLUTION 250 ML FLEX CONT

## 2025-07-27 PROCEDURE — 84484 ASSAY OF TROPONIN QUANT: CPT | Performed by: INTERNAL MEDICINE

## 2025-07-27 PROCEDURE — 85730 THROMBOPLASTIN TIME PARTIAL: CPT | Performed by: INTERNAL MEDICINE

## 2025-07-27 PROCEDURE — 25010000002 VANCOMYCIN 1 G RECONSTITUTED SOLUTION 1 EACH VIAL

## 2025-07-27 PROCEDURE — 85520 HEPARIN ASSAY: CPT | Performed by: INTERNAL MEDICINE

## 2025-07-27 PROCEDURE — 93308 TTE F-UP OR LMTD: CPT

## 2025-07-27 PROCEDURE — 93970 EXTREMITY STUDY: CPT

## 2025-07-27 PROCEDURE — 99222 1ST HOSP IP/OBS MODERATE 55: CPT | Performed by: INTERNAL MEDICINE

## 2025-07-27 PROCEDURE — 80061 LIPID PANEL: CPT | Performed by: INTERNAL MEDICINE

## 2025-07-27 PROCEDURE — 25810000003 SODIUM CHLORIDE 0.9 % SOLUTION: Performed by: INTERNAL MEDICINE

## 2025-07-27 PROCEDURE — 93970 EXTREMITY STUDY: CPT | Performed by: INTERNAL MEDICINE

## 2025-07-27 PROCEDURE — 85652 RBC SED RATE AUTOMATED: CPT | Performed by: INTERNAL MEDICINE

## 2025-07-27 PROCEDURE — 82948 REAGENT STRIP/BLOOD GLUCOSE: CPT

## 2025-07-27 PROCEDURE — 80307 DRUG TEST PRSMV CHEM ANLYZR: CPT | Performed by: INTERNAL MEDICINE

## 2025-07-27 PROCEDURE — 84100 ASSAY OF PHOSPHORUS: CPT | Performed by: INTERNAL MEDICINE

## 2025-07-27 PROCEDURE — 25010000002 CEFEPIME PER 500 MG: Performed by: INTERNAL MEDICINE

## 2025-07-27 PROCEDURE — 99233 SBSQ HOSP IP/OBS HIGH 50: CPT | Performed by: INTERNAL MEDICINE

## 2025-07-27 PROCEDURE — 94640 AIRWAY INHALATION TREATMENT: CPT

## 2025-07-27 PROCEDURE — 63710000001 INSULIN LISPRO (HUMAN) PER 5 UNITS: Performed by: INTERNAL MEDICINE

## 2025-07-27 PROCEDURE — 83605 ASSAY OF LACTIC ACID: CPT | Performed by: INTERNAL MEDICINE

## 2025-07-27 PROCEDURE — 25010000002 MORPHINE PER 10 MG: Performed by: INTERNAL MEDICINE

## 2025-07-27 PROCEDURE — 86140 C-REACTIVE PROTEIN: CPT | Performed by: INTERNAL MEDICINE

## 2025-07-27 PROCEDURE — 85610 PROTHROMBIN TIME: CPT | Performed by: INTERNAL MEDICINE

## 2025-07-27 PROCEDURE — 84145 PROCALCITONIN (PCT): CPT | Performed by: INTERNAL MEDICINE

## 2025-07-27 PROCEDURE — 93321 DOPPLER ECHO F-UP/LMTD STD: CPT | Performed by: INTERNAL MEDICINE

## 2025-07-27 PROCEDURE — 81001 URINALYSIS AUTO W/SCOPE: CPT | Performed by: INTERNAL MEDICINE

## 2025-07-27 RX ORDER — SENNOSIDES 8.6 MG
650 CAPSULE ORAL 2 TIMES DAILY
Status: DISCONTINUED | OUTPATIENT
Start: 2025-07-27 | End: 2025-07-30

## 2025-07-27 RX ORDER — HEPARIN SODIUM 10000 [USP'U]/100ML
18 INJECTION, SOLUTION INTRAVENOUS
Status: DISCONTINUED | OUTPATIENT
Start: 2025-07-27 | End: 2025-07-27

## 2025-07-27 RX ORDER — ALBUTEROL SULFATE 0.83 MG/ML
2.5 SOLUTION RESPIRATORY (INHALATION) EVERY 6 HOURS PRN
Status: DISCONTINUED | OUTPATIENT
Start: 2025-07-27 | End: 2025-07-27

## 2025-07-27 RX ORDER — NITROGLYCERIN 0.4 MG/1
0.4 TABLET SUBLINGUAL
Status: DISCONTINUED | OUTPATIENT
Start: 2025-07-27 | End: 2025-07-31 | Stop reason: HOSPADM

## 2025-07-27 RX ORDER — HEPARIN SODIUM 1000 [USP'U]/ML
2000 INJECTION, SOLUTION INTRAVENOUS; SUBCUTANEOUS AS NEEDED
Status: DISCONTINUED | OUTPATIENT
Start: 2025-07-27 | End: 2025-07-27 | Stop reason: SDUPTHER

## 2025-07-27 RX ORDER — ACETAMINOPHEN 325 MG/1
650 TABLET ORAL EVERY 6 HOURS PRN
Status: DISCONTINUED | OUTPATIENT
Start: 2025-07-27 | End: 2025-07-31 | Stop reason: HOSPADM

## 2025-07-27 RX ORDER — HEPARIN SODIUM 1000 [USP'U]/ML
4000 INJECTION, SOLUTION INTRAVENOUS; SUBCUTANEOUS AS NEEDED
Status: DISCONTINUED | OUTPATIENT
Start: 2025-07-27 | End: 2025-07-27 | Stop reason: SDUPTHER

## 2025-07-27 RX ORDER — AMLODIPINE BESYLATE 10 MG/1
10 TABLET ORAL DAILY
Status: DISCONTINUED | OUTPATIENT
Start: 2025-07-27 | End: 2025-07-31

## 2025-07-27 RX ORDER — ASPIRIN 81 MG/1
81 TABLET, CHEWABLE ORAL DAILY
Status: DISCONTINUED | OUTPATIENT
Start: 2025-07-27 | End: 2025-07-27 | Stop reason: SDUPTHER

## 2025-07-27 RX ORDER — LOSARTAN POTASSIUM 25 MG/1
25 TABLET ORAL
Status: DISCONTINUED | OUTPATIENT
Start: 2025-07-27 | End: 2025-07-31 | Stop reason: HOSPADM

## 2025-07-27 RX ORDER — FAMOTIDINE 20 MG/1
20 TABLET, FILM COATED ORAL 2 TIMES DAILY
Status: DISCONTINUED | OUTPATIENT
Start: 2025-07-27 | End: 2025-07-31 | Stop reason: HOSPADM

## 2025-07-27 RX ORDER — TRAZODONE HYDROCHLORIDE 100 MG/1
100 TABLET ORAL NIGHTLY PRN
Status: DISCONTINUED | OUTPATIENT
Start: 2025-07-27 | End: 2025-07-31 | Stop reason: HOSPADM

## 2025-07-27 RX ORDER — CARVEDILOL 3.12 MG/1
3.12 TABLET ORAL 2 TIMES DAILY WITH MEALS
Status: DISCONTINUED | OUTPATIENT
Start: 2025-07-27 | End: 2025-07-31

## 2025-07-27 RX ORDER — ROSUVASTATIN CALCIUM 20 MG/1
20 TABLET, COATED ORAL DAILY
Status: DISCONTINUED | OUTPATIENT
Start: 2025-07-27 | End: 2025-07-31 | Stop reason: HOSPADM

## 2025-07-27 RX ORDER — FLUOXETINE 10 MG/1
10 CAPSULE ORAL DAILY
Status: DISCONTINUED | OUTPATIENT
Start: 2025-07-27 | End: 2025-07-31 | Stop reason: HOSPADM

## 2025-07-27 RX ORDER — COLCHICINE 0.6 MG/1
0.6 TABLET ORAL EVERY 12 HOURS SCHEDULED
Status: DISCONTINUED | OUTPATIENT
Start: 2025-07-27 | End: 2025-07-30

## 2025-07-27 RX ORDER — COLCHICINE 0.6 MG/1
0.6 TABLET ORAL DAILY
Status: DISCONTINUED | OUTPATIENT
Start: 2025-07-27 | End: 2025-07-27

## 2025-07-27 RX ORDER — CLOPIDOGREL BISULFATE 75 MG/1
75 TABLET ORAL DAILY
Status: DISCONTINUED | OUTPATIENT
Start: 2025-07-27 | End: 2025-07-31 | Stop reason: HOSPADM

## 2025-07-27 RX ADMIN — INSULIN LISPRO 2 UNITS: 100 INJECTION, SOLUTION INTRAVENOUS; SUBCUTANEOUS at 21:17

## 2025-07-27 RX ADMIN — ACETAMINOPHEN 650 MG: 325 TABLET ORAL at 06:48

## 2025-07-27 RX ADMIN — CEFEPIME 2000 MG: 2 INJECTION, POWDER, FOR SOLUTION INTRAVENOUS at 05:46

## 2025-07-27 RX ADMIN — Medication 10 ML: at 08:46

## 2025-07-27 RX ADMIN — CARVEDILOL 3.12 MG: 3.12 TABLET, FILM COATED ORAL at 08:45

## 2025-07-27 RX ADMIN — HEPARIN SODIUM 18 UNITS/KG/HR: 10000 INJECTION, SOLUTION INTRAVENOUS at 04:22

## 2025-07-27 RX ADMIN — MORPHINE SULFATE 1 MG: 2 INJECTION, SOLUTION INTRAMUSCULAR; INTRAVENOUS at 21:18

## 2025-07-27 RX ADMIN — ASPIRIN 650 MG: 325 TABLET, COATED ORAL at 21:18

## 2025-07-27 RX ADMIN — AMLODIPINE BESYLATE 10 MG: 10 TABLET ORAL at 08:45

## 2025-07-27 RX ADMIN — VANCOMYCIN HYDROCHLORIDE 1000 MG: 1 INJECTION, POWDER, LYOPHILIZED, FOR SOLUTION INTRAVENOUS at 12:24

## 2025-07-27 RX ADMIN — FLUOXETINE HYDROCHLORIDE 10 MG: 10 CAPSULE ORAL at 08:45

## 2025-07-27 RX ADMIN — INDOMETHACIN 25 MG: 25 CAPSULE ORAL at 08:45

## 2025-07-27 RX ADMIN — NICOTINE 1 PATCH: 14 PATCH TRANSDERMAL at 21:19

## 2025-07-27 RX ADMIN — ASPIRIN 81 MG: 81 TABLET, CHEWABLE ORAL at 08:45

## 2025-07-27 RX ADMIN — ASPIRIN 650 MG: 325 TABLET, COATED ORAL at 10:24

## 2025-07-27 RX ADMIN — SODIUM CHLORIDE 50 ML/HR: 9 INJECTION, SOLUTION INTRAVENOUS at 17:55

## 2025-07-27 RX ADMIN — MORPHINE SULFATE 1 MG: 2 INJECTION, SOLUTION INTRAMUSCULAR; INTRAVENOUS at 10:29

## 2025-07-27 RX ADMIN — COLCHICINE 0.6 MG: 0.6 TABLET, FILM COATED ORAL at 08:45

## 2025-07-27 RX ADMIN — FAMOTIDINE 20 MG: 20 TABLET, FILM COATED ORAL at 21:17

## 2025-07-27 RX ADMIN — LOSARTAN POTASSIUM 25 MG: 25 TABLET, FILM COATED ORAL at 08:45

## 2025-07-27 RX ADMIN — MORPHINE SULFATE 1 MG: 2 INJECTION, SOLUTION INTRAMUSCULAR; INTRAVENOUS at 17:39

## 2025-07-27 RX ADMIN — IPRATROPIUM BROMIDE AND ALBUTEROL SULFATE 3 ML: 2.5; .5 SOLUTION RESPIRATORY (INHALATION) at 04:27

## 2025-07-27 RX ADMIN — ROSUVASTATIN CALCIUM 20 MG: 20 TABLET, FILM COATED ORAL at 08:45

## 2025-07-27 RX ADMIN — CARVEDILOL 3.12 MG: 3.12 TABLET, FILM COATED ORAL at 17:39

## 2025-07-27 RX ADMIN — FAMOTIDINE 20 MG: 20 TABLET, FILM COATED ORAL at 08:45

## 2025-07-27 RX ADMIN — MORPHINE SULFATE 1 MG: 2 INJECTION, SOLUTION INTRAMUSCULAR; INTRAVENOUS at 04:18

## 2025-07-27 RX ADMIN — COLCHICINE 0.6 MG: 0.6 TABLET ORAL at 21:17

## 2025-07-27 RX ADMIN — CLOPIDOGREL BISULFATE 75 MG: 75 TABLET, FILM COATED ORAL at 08:45

## 2025-07-27 NOTE — PLAN OF CARE
Goal Outcome Evaluation:              Outcome Evaluation: VSS on room air. NSR. complains of pain, given prn meds.  NPO midnight.

## 2025-07-27 NOTE — CASE MANAGEMENT/SOCIAL WORK
Discharge Planning Assessment  [facility]     Patient Name: Wilmer Stover  MRN: [id number]  Today's Date: 7/27/2025    Admit Date: 7/26/2025    Plan: Home   Discharge Needs Assessment       Row Name 07/27/25 1332       Living Environment    People in Home other (see comments)    Name(s) of People in Home 5 roommates    Current Living Arrangements home    Potentially Unsafe Housing Conditions none    In the past 12 months has the electric, gas, oil, or water company threatened to shut off services in your home? No    Primary Care Provided by self    Provides Primary Care For no one    Family Caregiver if Needed spouse    Family Caregiver Names Jayne Stover (spouse) [phone]    Quality of Family Relationships unable to assess    Able to Return to Prior Arrangements yes       Resource/Environmental Concerns    Resource/Environmental Concerns reliable transportation;financial    Financial Concerns insurance, none  requested [employer] screen Pt for Medicaid    Transportation Concerns none       Transportation Needs    In the past 12 months, has lack of transportation kept you from medical appointments or from getting medications? yes    In the past 12 months, has lack of transportation kept you from meetings, work, or from getting things needed for daily living? No       Food Insecurity    Within the past 12 months, you worried that your food would run out before you got the money to buy more. Never true    Within the past 12 months, the food you bought just didn't last and you didn't have money to get more. Never true       Transition Planning    Patient/Family Anticipates Transition to home    Patient/Family Anticipated Services at Transition     Transportation Anticipated family or friend will provide       Discharge Needs Assessment    Readmission Within the Last 30 Days current reason for admission unrelated to previous admission    Equipment Currently Used at Home bp cuff    Concerns  to be Addressed discharge planning    Do you want help finding or keeping work or a job? I do not need or want help    Do you want help with school or training? For example, starting or completing job training or getting a high school diploma, GED or equivalent No    Anticipated Changes Related to Illness none    Equipment Needed After Discharge bp cuff    Current Discharge Risk other (see comments)  lack of reliable transportation    Discharge Coordination/Progress I spoke with Pt, in room, to initiate discharge plan. Pt is admitted with pericarditis. He lives with 5 roommates in a two story home in [place]. There is one step at the entrance of the home. Address on file was verified. Prior to admission, Pt ambulated independently and was independent with ADLs. He has the following DME: BP cuff. His PCP is Dr Mata Maher. He does not currently have insurance. CM left a voicemail requesting [employer] screen him for Medicaid. He uses [facility], [facility]. He has difficulty paying for prescriptions due to lack of insurance. He denies HH/O2/OPPT. Pt states he drives and has a motorcycle. He reports difficulty attending physician appointments due to unreliable transportation from friends and famly. CM informed Pt that if he is approved for Medicaid, he can receive transportation to/from Dr appointments via [facility]. CM will also provide [facility] resource packet which includes information about transportation. His plan is to return home at discharge. Family will provide transportation. CM will continue to follow hospital course.                   Discharge Plan       Row Name 07/27/25 1343       Plan    Plan Home    Final Discharge Disposition Code 01 - home or self-care                  Continued Care and Services - Admitted Since 7/26/2025    No active coordination exists.       Expected Discharge Date and Time       Expected Discharge Date Expected Discharge Time    Jul 29, 2025 10:00 AM            Demographic Summary       Row Name 07/27/25 1331       General Information    Arrived From home    Referral Source emergency department    Reason for Consult discharge planning    Preferred Language English    General Information Comments PCP Dr Mata Maher       Contact Information    Permission Granted to Share Info With     Contact Information Obtained for     Contact Information Comments Jayne Stover (spouse) [phone]                   Functional Status       Row Name 07/27/25 1332       Functional Status    Usual Activity Tolerance good    Current Activity Tolerance moderate       Functional Status, IADL    Medications independent    Meal Preparation independent    Housekeeping independent    Laundry independent    Shopping independent                   Psychosocial    No documentation.                  Abuse/Neglect    No documentation.                  Legal    No documentation.                  Substance Abuse    No documentation.                  Patient Forms    No documentation.                     Jennifer Gipson RN

## 2025-07-27 NOTE — CONSULTS
[facility] at [facility]  Cardiovascular Consultation/h&p Note      Wilmer Stover  [id number]  1975  N643/1    Referring Provider: No ref. provider found   PCP: Mata Maher MD    DATE OF ADMISSION: 7/26/2025    Reason for Consultation: pericarditis    History of Present Illness  -year-old man with a past medical history of Hodgkin's lymphoma in remission, CAD status post recent PCI, diabetes presenting with pleuritic chest pain.  He notes that he was having chest pain depending on position and also breathing pattern which was very similar to his prior presentations with pericarditis.  Patient was discharged home with colchicine and notes compliance.  Repeat limited echo during this admission shows moderate size pericardial effusion which appear to have increased in size; no signs of cardiac tamponade.     Past Medical History:   Diagnosis Date    Allergic     Anxiety     Arthritis     Asthma     COPD (chronic obstructive pulmonary disease)     Depression     Gall stones     Hepatitis C     Hodgkin's lymphoma     Hyperlipidemia     Hypertension     Kidney stone     Memory loss     Neuropathy     Numbness and tingling     Thyroid disease     Weakness        Past Surgical History:   Procedure Laterality Date    CARDIAC CATHETERIZATION N/A 7/12/2025    Procedure: Left Heart Cath;  Surgeon: Slick Lama IV, MD;  [facility];  Service: Cardiovascular;  Laterality: N/A;    CARDIAC CATHETERIZATION N/A 7/12/2025    Procedure: Optical Coherence Tomography;  Surgeon: Slick Lama IV, MD;  [facility];  Service: Cardiovascular;  Laterality: N/A;    CARDIAC CATHETERIZATION N/A 7/12/2025    Procedure: Stent LETICIA coronary;  Surgeon: Slick Lama IV, MD;  Location:  [facility] CATH INVASIVE LOCATION;  Service: Cardiovascular;  Laterality: N/A;    CHOLECYSTECTOMY      CYSTOSCOPY BLADDER STONE LITHOTRIPSY       MASTECTOMY MODIFIED RADICAL W/ AXILLARY LYMPH NODES W/ OR W/O PECTORALIS MINOR         No current facility-administered medications on file prior to encounter.     Current Outpatient Medications on File Prior to Encounter   Medication Sig Dispense Refill    albuterol sulfate  (90 Base) MCG/ACT inhaler Inhale 2 puffs Every 4 (Four) Hours As Needed for Wheezing or Shortness of Air. 18 g 5    amLODIPine (NORVASC) 10 MG tablet Take 1 tablet by mouth Daily. 90 tablet 1    aspirin 81 MG chewable tablet Chew 1 tablet Daily. 30 tablet 11    carvedilol (COREG) 3.125 MG tablet Take 1 tablet by mouth 2 (Two) Times a Day With Meals. 60 tablet 3    clopidogrel (PLAVIX) 75 MG tablet Take 1 tablet by mouth Daily. 30 tablet 11    colchicine 0.6 MG tablet Take 1 tablet by mouth Daily. 30 tablet 3    famotidine (PEPCID) 20 MG tablet Take 1 tablet by mouth 2 (Two) Times a Day. 60 tablet 3    FLUoxetine (PROzac) 10 MG capsule Take 1 capsule by mouth Daily. 90 capsule 3    losartan (COZAAR) 25 MG tablet Take 1 tablet by mouth Daily. 30 tablet 11    nitroglycerin (NITROSTAT) 0.4 MG SL tablet Place 1 tablet under the tongue Every 5 (Five) Minutes As Needed for Chest Pain. Take no more than 3 doses in 15 minutes. 25 tablet 5    [] predniSONE (DELTASONE) 20 MG tablet Take 2 tablets by mouth Daily for 5 days. 10 tablet 0    rosuvastatin (CRESTOR) 20 MG tablet Take 1 tablet by mouth Daily. 30 tablet 3    traZODone (DESYREL) 100 MG tablet Take 1 tablet by mouth every night at bedtime. 90 tablet 3       Social History     Socioeconomic History    Marital status:    Tobacco Use    Smoking status: Some Days     Current packs/day: 0.50     Types: Cigarettes    Smokeless tobacco: Never   Vaping Use    Vaping status: Never Used   Substance and Sexual Activity    Alcohol use: No    Drug use: Not Currently     Types: Methamphetamines, Oxycodone, Heroin, Hydrocodone, Hydromorphone, Ketamine, Morphine, Fentanyl, Marijuana      "Comment: sobriety date 12/22/2022    Sexual activity: Defer       Family History   Problem Relation Age of Onset    Hypertension Mother     Hyperlipidemia Mother     Alcohol abuse Mother     Arthritis Mother     Alcohol abuse Father     Heart disease Maternal Grandmother        Review of Systems   Constitutional:  Positive for fatigue. Negative for activity change and fever.   Respiratory:  Negative for chest tightness and shortness of breath.    Cardiovascular:  Positive for chest pain and palpitations. Negative for leg swelling.   Gastrointestinal:  Negative for constipation and diarrhea.   Genitourinary:  Negative for decreased urine volume and difficulty urinating.   Skin:  Negative for wound.   Neurological:  Negative for dizziness, syncope, weakness and light-headedness.   Psychiatric/Behavioral:  Negative for suicidal ideas.          Physical Exam  Vitals:    07/27/25 0553 07/27/25 0650 07/27/25 0835 07/27/25 0844   BP:       BP Location:       Patient Position:       Pulse:       Resp:    16   Temp: 99.3 °F (37.4 °C) 98.1 °F (36.7 °C)  98.1 °F (36.7 °C)   TempSrc:    Oral   SpO2:       Weight:   81.6 kg (179 lb 14.3 oz)    Height:   167.6 cm (65.98\")      GENERAL: Well-developed, well-nourished patient in no acute distress.  HEENT: NC, AC, PERRLA. MMM  NECK: No JVD. No carotid bruits auscultated.  LUNGS: Clear to auscultation bilaterally.  CARDIOVASCULAR: RRR No murmurs, gallops or rubs noted.   ABDOMEN: Soft, nontender. Positive bowel sounds.  MUSCULOSKELETAL: No gross deformities. No clubbing, cyanosis  EXT: pulses intact, No edema  SKIN: Pink, warm  Neuro: Nonfocal exam. Gait intact    Lab Review:            Results from last 7 days   Lab Units 07/27/25  0418   SODIUM mmol/L 137   POTASSIUM mmol/L 3.9   CHLORIDE mmol/L 106   CO2 mmol/L 18.9*   BUN mg/dL 8.9   CREATININE mg/dL 0.75*   GLUCOSE mg/dL 73   CALCIUM mg/dL 8.4*     Results from last 7 days   Lab Units 07/27/25  0418 07/26/25  2239 07/26/25  1809 "   HSTROP T ng/L 10 43* 10     Results from last 7 days   Lab Units 07/27/25 0418   WBC 10*3/mm3 11.44*   HEMOGLOBIN g/dL 12.4*   HEMATOCRIT % 37.8   PLATELETS 10*3/mm3 431     Results from last 7 days   Lab Units 07/27/25 0418   INR  1.25*   APTT seconds 36.3*     Results from last 7 days   Lab Units 07/27/25 0418   CHOLESTEROL mg/dL 95     Results from last 7 days   Lab Units 07/27/25  0418   MAGNESIUM mg/dL 2.2     Results from last 7 days   Lab Units 07/27/25 0418   CHOLESTEROL mg/dL 95   TRIGLYCERIDES mg/dL 61   HDL CHOL mg/dL 25*   LDL CHOL mg/dL 56         EKG: Normal sinus rhythm nonspecific T wave abnormalities  ECHO: Normal ejection fraction, moderate pericardial effusion.  CATH: 7/12    Severe 2-vessel CAD (proximal RCA, OM1).  Culprit for NSTEMI appeared to be proximal RCA stenosis.    No left ventriculography performed    Mildly elevated LV filling pressure (LVEDP 17 mmHg)    Mild to moderate diffuse atherosclerosis noted throughout multiple vascular beds present.  Recommend high intensity medical therapy      I personally viewed and interpreted the patient's EKG/telemetry/lab/imaging data    Assessment & Plan:    Recurrent pericarditis  - repeat limited echo showing enlarging pericardial effusion, but without tamponade  - ESR and CRP added on  - patient is 81kg and should be on colchicine 0.6mg po bid  - in recent setting of CAD with PCI, would like to avoid indomethacin and glucosteroids   - starting ASA 650mg po bid, which can be tapered down as outpatient   - if refractory steroids vs IL1 inhibitor should be considered  - NPO after midnight in case we decide on elective pericardiocentesis    CAD s/p PCI  - continue DAPT  - continue rosuvastatin  - continue carvedilol      Thank you for allowing me to participate in the care of Wilmer Stover. Feel free to contact me directly with any further questions or concerns.    Kristofer Dong MD [facility]  Cardiac Electrophysiologist  [facility]     07/27/25  08:59 EDT.

## 2025-07-27 NOTE — ED NOTES
Wilmer Stover    Nursing Report ED to Floor:  Mental status: A&O x4  Ambulatory status: Standby  Oxygen Therapy:  RA  Cardiac Rhythm: NSR  Admitted from: Ed/Home  Safety Concerns:  NA  Precautions: NA  Social Issues: NA  ED Room #:  14    ED Nurse Phone Extension - [phone] or may call [phone].      HPI:   Chief Complaint   Patient presents with    Chest Pain       Past Medical History:  Past Medical History:   Diagnosis Date    Allergic     Anxiety     Arthritis     Asthma     COPD (chronic obstructive pulmonary disease)     Depression     Gall stones     Hepatitis C     Hodgkin's lymphoma     Hyperlipidemia     Hypertension     Kidney stone     Memory loss     Neuropathy     Numbness and tingling     Thyroid disease     Weakness         Past Surgical History:  Past Surgical History:   Procedure Laterality Date    CARDIAC CATHETERIZATION N/A 7/12/2025    Procedure: Left Heart Cath;  Surgeon: Slick Lama IV, MD;  Location:  [facility];  Service: Cardiovascular;  Laterality: N/A;    CARDIAC CATHETERIZATION N/A 7/12/2025    Procedure: Optical Coherence Tomography;  Surgeon: Slick Lama IV, MD;  Location:  [facility];  Service: Cardiovascular;  Laterality: N/A;    CARDIAC CATHETERIZATION N/A 7/12/2025    Procedure: Stent LETICIA coronary;  Surgeon: Slick Lama IV, MD;  Location:  [facility];  Service: Cardiovascular;  Laterality: N/A;    CHOLECYSTECTOMY      CYSTOSCOPY BLADDER STONE LITHOTRIPSY      MASTECTOMY MODIFIED RADICAL W/ AXILLARY LYMPH NODES W/ OR W/O PECTORALIS MINOR          Admitting Doctor:   Sushma Arevalo MD    Consulting Provider(s):  Consults       Date and Time Order Name Status Description    7/26/2025  8:40 PM Inpatient Pulmonology Consult      7/26/2025  8:40 PM Inpatient Cardiology Consult      7/11/2025 11:00 PM Inpatient Cardiology Consult Completed              Admitting Diagnosis:   The primary  encounter diagnosis was Acute sepsis. Diagnoses of Pneumonia due to infectious organism, unspecified laterality, unspecified part of lung, Acute pericarditis, unspecified type, Pericardial effusion, and Smoker were also pertinent to this visit.    Most Recent Vitals:   Vitals:    07/26/25 1900 07/26/25 1930 07/26/25 2000 07/26/25 2030   BP: 96/74 107/78 123/89 139/100   BP Location:       Patient Position:       Pulse: 98 101 92 89   Resp:       Temp:       TempSrc:       SpO2: 96% 94% 95% 94%   Weight:       Height:           Active LDAs/IV Access:   Lines, Drains & Airways       Active LDAs       Name Placement date Placement time Site Days    Peripheral IV 07/26/25 1807 18 G Anterior;Proximal;Right;Upper Arm 07/26/25 1807  Arm  less than 1                    Labs (abnormal labs have a star):   Labs Reviewed   COMPREHENSIVE METABOLIC PANEL - Abnormal; Notable for the following components:       Result Value    Glucose 126 (*)     Total Protein 8.6 (*)     All other components within normal limits    Narrative:     GFR Categories in Chronic Kidney Disease (CKD)              GFR Category          GFR (mL/min/1.73)    Interpretation  G1                    90 or greater        Normal or high (1)  G2                    60-89                Mild decrease (1)  G3a                   45-59                Mild to moderate decrease  G3b                   30-44                Moderate to severe decrease  G4                    15-29                Severe decrease  G5                    14 or less           Kidney failure    (1)In the absence of evidence of kidney disease, neither GFR category G1 or G2 fulfill the criteria for CKD.    eGFR calculation 2021 CKD-EPI creatinine equation, which does not include race as a factor   CBC WITH AUTO DIFFERENTIAL - Abnormal; Notable for the following components:    WBC 16.52 (*)     RDW 11.4 (*)     RDW-SD 36.1 (*)     Platelets 599 (*)     Neutrophil % 79.4 (*)     Lymphocyte % 9.6 (*)   "   Neutrophils, Absolute 13.11 (*)     Monocytes, Absolute 1.57 (*)     Immature Grans, Absolute 0.06 (*)     All other components within normal limits   D-DIMER, QUANTITATIVE - Abnormal; Notable for the following components:    D-Dimer, Quantitative 5.75 (*)     All other components within normal limits    Narrative:     According to the assay 's published package insert, a normal (<0.50 MCGFEU/mL) D-dimer result in conjunction with a non-high clinical probability assessment, excludes deep vein thrombosis (DVT) and pulmonary embolism (PE) with high sensitivity.    D-dimer values increase with age and this can make VTE exclusion of an older population difficult. To address this, the [employer], based on best available evidence and recent guidelines, recommends that clinicians use age-adjusted D-dimer thresholds in patients greater than 50 years of age with: a) a low probability of PE who do not meet all Pulmonary Embolism Rule Out Criteria, or b) in those with intermediate probability of PE.   The formula for an age-adjusted D-dimer cut-off is \"age/100\".  For example, a 60 year old patient would have an age-adjusted cut-off of 0.60 MCGFEU/mL and an 80 year old 0.80 MCGFEU/mL.   RESPIRATORY PANEL PCR W/ COVID-19 (SARS-COV-2), NP SWAB IN UTM/VTP, 2 HR TAT - Normal    Narrative:     In the setting of a positive respiratory panel with a viral infection PLUS a negative procalcitonin without other underlying concern for bacterial infection, consider observing off antibiotics or discontinuation of antibiotics and continue supportive care. If the respiratory panel is positive for atypical bacterial infection (Bordetella pertussis, Chlamydophila pneumoniae, or Mycoplasma pneumoniae), consider antibiotic de-escalation to target atypical bacterial infection.   TROPONIN - Normal    Narrative:     High Sensitive Troponin T Reference Range:  <14.0 ng/L- Negative Female for AMI  <22.0 ng/L- Negative " "Male for AMI  >=14 - Abnormal Female indicating possible myocardial injury.  >=22 - Abnormal Male indicating possible myocardial injury.   Clinicians would have to utilize clinical acumen, EKG, Troponin, and serial changes to determine if it is an Acute Myocardial Infarction or myocardial injury due to an underlying chronic condition.        LIPASE - Normal   BNP (IN-HOUSE) - Normal    Narrative:     This assay is used as an aid in the diagnosis of individuals suspected of having heart failure. It can be used as an aid in the diagnosis of acute decompensated heart failure (ADHF) in patients presenting with signs and symptoms of ADHF to the emergency department (ED). In addition, NT-proBNP of <300 pg/mL indicates ADHF is not likely.    Age Range Result Interpretation  NT-proBNP Concentration (pg/mL:      <50             Positive            >450                   Gray                 300-450                    Negative             <300    50-75           Positive            >900                  Gray                300-900                  Negative            <300      >75             Positive            >1800                  Gray                300-1800                  Negative            <300   PROCALCITONIN - Normal    Narrative:     As a Marker for Sepsis (Non-Neonates):    1. <0.5 ng/mL represents a low risk of severe sepsis and/or septic shock.  2. >2 ng/mL represents a high risk of severe sepsis and/or septic shock.    As a Marker for Lower Respiratory Tract Infections that require antibiotic therapy:    PCT on Admission    Antibiotic Therapy       6-12 Hrs later    >0.5                Strongly Recommended  >0.25 - <0.5        Recommended   0.1 - 0.25          Discouraged              Remeasure/reassess PCT  <0.1                Strongly Discouraged     Remeasure/reassess PCT    As 28 day mortality risk marker: \"Change in Procalcitonin Result\" (>80% or <=80%) if Day 0 (or Day 1) and Day 4 values are available. " Refer to http://www.[email or web address]-pct-calculator.com    Change in PCT <=80%  A decrease of PCT levels below or equal to 80% defines a positive change in PCT test result representing a higher risk for 28-day all-cause mortality of patients diagnosed with severe sepsis for septic shock.    Change in PCT >80%  A decrease of PCT levels of more than 80% defines a negative change in PCT result representing a lower risk for 28-day all-cause mortality of patients diagnosed with severe sepsis or septic shock.      HIGH SENSITIVITIY TROPONIN T 1HR - Normal    Narrative:     High Sensitive Troponin T Reference Range:  <14.0 ng/L- Negative Female for AMI  <22.0 ng/L- Negative Male for AMI  >=14 - Abnormal Female indicating possible myocardial injury.  >=22 - Abnormal Male indicating possible myocardial injury.   Clinicians would have to utilize clinical acumen, EKG, Troponin, and serial changes to determine if it is an Acute Myocardial Infarction or myocardial injury due to an underlying chronic condition.        COVID PRE-OP / PRE-PROCEDURE SCREENING ORDER (NO ISOLATION)    Narrative:     The following orders were created for panel order COVID PRE-OP / PRE-PROCEDURE SCREENING ORDER (NO ISOLATION) - Swab, Nasopharynx.  Procedure                               Abnormality         Status                     ---------                               -----------         ------                     Respiratory Panel PCR w/...[[id number]]  Normal              Final result                 Please view results for these tests on the individual orders.   BLOOD CULTURE   BLOOD CULTURE   RAINBOW DRAW    Narrative:     The following orders were created for panel order [place] Draw.  Procedure                               Abnormality         Status                     ---------                               -----------         ------                     Green Top (Gel)[[id number]]                                  Final result               Lavender  Top[[id number]]                                     Final result               Gold Top - [facility][[id number]]                                   Final result               Moore Top[[id number]]                                         Final result               Light Blue Top[[id number]]                                   Final result                 Please view results for these tests on the individual orders.   MAGNESIUM   PHOSPHORUS   TROPONIN   URINALYSIS W/ MICROSCOPIC IF INDICATED (NO CULTURE)   URINE DRUG SCREEN   LACTIC ACID, PLASMA   BLOOD GAS, ARTERIAL   ETHANOL   POCT GLUCOSE FINGERSTICK   CBC AND DIFFERENTIAL    Narrative:     The following orders were created for panel order CBC & Differential.  Procedure                               Abnormality         Status                     ---------                               -----------         ------                     CBC Auto Differential[[id number]]        Abnormal            Final result                 Please view results for these tests on the individual orders.   GREEN TOP   LAVENDER TOP   [facility]   GRAY TOP   LIGHT BLUE TOP       Meds Given in ED:   Medications   sodium chloride 0.9 % flush 10 mL (has no administration in time range)   indomethacin (INDOCIN) capsule 25 mg (has no administration in time range)   colchicine tablet 0.6 mg (0.6 mg Oral Given 7/26/25 2039)   sodium chloride 0.9 % flush 10 mL (has no administration in time range)   sodium chloride 0.9 % flush 10 mL (has no administration in time range)   sodium chloride 0.9 % infusion 40 mL (has no administration in time range)   heparin (porcine) 5000 UNIT/ML injection 5,000 Units (has no administration in time range)   nitroglycerin (NITROSTAT) SL tablet 0.4 mg (has no administration in time range)   Potassium Replacement - Follow Nurse / BPA Driven Protocol (has no administration in time range)   Magnesium Cardiology Dose Replacement - Follow Nurse / BPA Driven Protocol (has no  administration in time range)   Phosphorus Replacement - Follow Nurse / BPA Driven Protocol (has no administration in time range)   Calcium Replacement - Follow Nurse / BPA Driven Protocol (has no administration in time range)   morphine injection 1 mg (has no administration in time range)     And   naloxone (NARCAN) injection 0.4 mg (has no administration in time range)   sennosides-docusate (PERICOLACE) 8.6-50 MG per tablet 2 tablet (has no administration in time range)     And   polyethylene glycol (MIRALAX) packet 17 g (has no administration in time range)     And   bisacodyl (DULCOLAX) EC tablet 5 mg (has no administration in time range)     And   bisacodyl (DULCOLAX) suppository 10 mg (has no administration in time range)   ondansetron (ZOFRAN) injection 4 mg (has no administration in time range)   famotidine (PEPCID) tablet 40 mg (has no administration in time range)   nicotine (NICODERM CQ) 14 MG/24HR patch 1 patch (has no administration in time range)   nicotine polacrilex (NICORETTE) gum 2 mg (has no administration in time range)   aspirin chewable tablet 324 mg (324 mg Oral Given 7/26/25 1711)   acetaminophen (TYLENOL) tablet 1,000 mg (1,000 mg Oral Given 7/26/25 1711)   sodium chloride 0.9 % bolus 1,000 mL (0 mL Intravenous Stopped 7/26/25 1936)   iopamidol (ISOVUE-370) 76 % injection 100 mL (75 mL Intravenous Given 7/26/25 1841)   cefTRIAXone (ROCEPHIN) 1,000 mg in sodium chloride 0.9 % 100 mL MBP (0 mg Intravenous Stopped 7/26/25 2017)   azithromycin (ZITHROMAX) tablet 500 mg (500 mg Oral Given 7/26/25 1938)   ketorolac (TORADOL) injection 15 mg (15 mg Intravenous Given 7/26/25 1939)           Last NIH score:                                                          Dysphagia screening results:  Patient Factors Component (Dysphagia:Stroke or Rule-out)  Best Eye Response: 4-->(E4) spontaneous (07/26/25 1727)  Best Motor Response: 6-->(M6) obeys commands (07/26/25 1727)  Best Verbal Response: 5-->(V5)  oriented (07/26/25 1727)  [place] Coma Scale Score: 15 (07/26/25 1727)     [place] Coma Scale:  No data recorded     CIWA:        Restraint Type:            Isolation Status:  No active isolations

## 2025-07-27 NOTE — PROGRESS NOTES
[facility] Medicine Services  PROGRESS NOTE    Patient Name: Wilmer Stover  : 1975  MRN: [id number]    Date of Admission: 2025  Primary Care Physician: Mata Maher MD    Subjective   Subjective     CC:  F/u chest pain    HPI:  Patient resting in bed. Still having some shortness of breath and chest pain, especially with deep breathing.      Objective   Objective     Vital Signs:   Temp:  [97.9 °F (36.6 °C)-100.9 °F (38.3 °C)] 98.1 °F (36.7 °C)  Heart Rate:  [] 77  Resp:  [16-20] 16  BP: ()/() 111/81     Physical Exam:  Constitutional: No acute distress, awake, alert  HENT: NCAT, mucous membranes moist  Respiratory: Clear to auscultation bilaterally, respiratory effort normal   Cardiovascular: RRR, no murmurs, rubs, or gallops  Gastrointestinal: soft, nontender, nondistended  Musculoskeletal: No bilateral ankle edema  Psychiatric: Appropriate affect, cooperative  Neurologic: Oriented x 3, speech clear, no focal deficits  Skin: No rashes      Results Reviewed:  LAB RESULTS:      Lab 25  0418 25  2239 25  1809 25  1705   WBC 11.44*  --   --  16.52*   HEMOGLOBIN 12.4*  --   --  15.1   HEMATOCRIT 37.8  --   --  45.0   PLATELETS 431  --   --  599*   NEUTROS ABS 7.95*  --   --  13.11*   IMMATURE GRANS (ABS) 0.06*  --   --  0.06*   LYMPHS ABS 1.54  --   --  1.59   MONOS ABS 1.53*  --   --  1.57*   EOS ABS 0.30  --   --  0.12   MCV 87.9  --   --  86.5   SED RATE 51*  --   --   --    CRP 18.30*  --   --   --    PROCALCITONIN 0.09  --   --  0.06   LACTATE 0.9 0.7  --   --    PROTIME 16.4*  --   --   --    APTT 36.3*  --   --   --    D DIMER QUANT  --   --   --  5.75*   HSTROP T 10 43* 10 9         Lab 25  0418 25  1809 25  1705   SODIUM 137  --  136   POTASSIUM 3.9  --  3.9   CHLORIDE 106  --  99   CO2 18.9*  --  23.5   ANION GAP 12.1  --  13.5   BUN 8.9  --  8.7   CREATININE 0.75*  --  0.98   EGFR 109.9  --  93.9    GLUCOSE 73  --  126*   CALCIUM 8.4*  --  9.8   MAGNESIUM 2.2 2.2  --    PHOSPHORUS 2.5 3.2  --    HEMOGLOBIN A1C 5.41  --   --          Lab 07/27/25  0418 07/26/25  1705   TOTAL PROTEIN 6.6 8.6*   ALBUMIN 3.3* 4.3   GLOBULIN 3.3 4.3   ALT (SGPT) 15 19   AST (SGOT) 20 24   BILIRUBIN 0.5 0.9   ALK PHOS 85 114   LIPASE  --  18         Lab 07/27/25  0418 07/26/25  2239 07/26/25  1809 07/26/25  1705   PROBNP  --   --  223.0 190.0   HSTROP T 10 43* 10 9   PROTIME 16.4*  --   --   --    INR 1.25*  --   --   --          Lab 07/27/25  0418   CHOLESTEROL 95   LDL CHOL 56   HDL CHOL 25*   TRIGLYCERIDES 61             Lab 07/26/25  2358   PH, ARTERIAL 7.438   PCO2, ARTERIAL 35.2   PO2 ART 74.1*   FIO2 21   HCO3 ART 23.8   BASE EXCESS ART 0.0   CARBOXYHEMOGLOBIN 1.6     Brief Urine Lab Results  (Last result in the past 365 days)        Color   Clarity   Blood   Leuk Est   Nitrite   Protein   CREAT   Urine HCG        07/27/25 0101 [place]   Cloudy   Trace   Trace   Negative   30 mg/dL (1+)                   Microbiology Results Abnormal       None            Duplex Venous Lower Extremity - Bilateral CAR  Result Date: 7/27/2025    Normal bilateral lower extremity venous duplex scan.     XR Abdomen KUB  Result Date: 7/27/2025  XR ABDOMEN KUB Date of Exam: 7/26/2025 8:56 PM EDT Indication: r/o free air Comparison: Same day CT chest Findings: There is no evidence of bowel obstruction. Nonspecific bowel gas pattern. No evidence of free intraperitoneal gas on this single upright image. Included lung bases show trace left pleural effusion and left lung atelectasis. There is contrast within the collecting system from same day CT chest. No acute bone abnormality..     Impression: Impression: No evidence of bowel obstruction or free intraperitoneal gas. Electronically Signed: Vlad Baxter MD  7/27/2025 8:29 AM EDT  Workstation ID: [id number]    CT Angiogram Chest Pulmonary Embolism  Result Date: 7/26/2025  CT ANGIOGRAM CHEST PULMONARY  EMBOLISM Date of Exam: 7/26/2025 6:35 PM EDT Indication: Pulmonary Embolism. Comparison: None available. Technique: Axial CT images were obtained of the chest after the uneventful intravenous administration of 75 cc Isovue-370 IV contrast utilizing pulmonary embolism protocol.  In addition, a 3-D volume rendered image was created for interpretation.  Reconstructed coronal and sagittal images were also obtained. Automated exposure control and iterative construction methods were used. Findings: There is a right apical opacity abutting the pleura with lobulated margins measuring 2.6 cm x 2.6 cm concerning for a mass. Normal atelectatic changes. Left upper lobe bandlike atelectasis. Large pericardial effusion with trace bilateral pleural fluid. No pneumothorax. No evidence of aortic dissection. No pulmonary embolus. Atheromatous disease of the coronary vessels. No mediastinal, perihilar, or axillary adenopathy. Thoracic vertebral body height and alignment are normal. The sternum is intact. No rib fractures are seen.     Impression: 1. Large pericardial effusion. No pulmonary embolus. 2. Lobulated right apical opacity during 2.6 cm x 2.6 cm concerning for a possible mass. PET/CT and/or biopsy recommended. Electronically Signed: Kane Villeda MD  7/26/2025 7:30 PM EDT  Workstation ID: [id number]    XR Chest 1 View  Result Date: 7/26/2025  XR CHEST 1 VW Date of Exam: 7/26/2025 3:30 PM EDT Indication: Chest Pain Triage Protocol Comparison: Chest radiograph dated 7/11/2025 Findings: There is bandlike atelectasis within the left midlung. There is streaky left basilar airspace opacity which is new from prior examination. This could represent atelectasis or pneumonia. The right lung is clear. There is no pleural effusion or pneumothorax. There are degenerative changes of the thoracic spine.     Impression: Impression: 1.Streaky left basilar airspace opacity which could represent atelectasis or pneumonia. 2.Bandlike atelectasis  within the left midlung. Electronically Signed: Padilla Reyes MD  7/26/2025 3:59 PM EDT  Workstation ID: [id number]      Results for orders placed during the hospital encounter of 07/26/25    Adult Transthoracic Echo Limited W/ Cont if Necessary Per Protocol 07/27/2025  8:48 AM    Interpretation Summary    Left ventricular systolic function is normal. Left ventricular ejection fraction appears to be 56 - 60%.    Moderate aortic valve regurgitation is present.    Mild mitral regurgitation.    Trace tricuspid regurgitation with normal RVSP.    There is a moderate (1-2cm) pericardial effusion. There is no evidence of cardiac tamponade.    On comparison with echocardiogram of 7/13/2025 the pericardial effusion has increased.      Current medications:  Scheduled Meds:amLODIPine, 10 mg, Oral, Daily  aspirin, 650 mg, Oral, BID  carvedilol, 3.125 mg, Oral, BID With Meals  cefepime, 2,000 mg, Intravenous, Q8H  clopidogrel, 75 mg, Oral, Daily  colchicine, 0.6 mg, Oral, Q12H  famotidine, 20 mg, Oral, BID  FLUoxetine, 10 mg, Oral, Daily  insulin lispro, 2-7 Units, Subcutaneous, 4x Daily AC & at Bedtime  losartan, 25 mg, Oral, Q24H  nicotine, 1 patch, Transdermal, Q24H  rosuvastatin, 20 mg, Oral, Daily  vancomycin, 1,000 mg, Intravenous, Q12H      Continuous Infusions:Pharmacy to dose vancomycin,   sodium chloride, 50 mL/hr, Last Rate: 50 mL/hr (07/26/25 9456)      PRN Meds:.  acetaminophen    senna-docusate sodium **AND** polyethylene glycol **AND** bisacodyl **AND** bisacodyl    Calcium Replacement - Follow Nurse / BPA Driven Protocol    dextrose    dextrose    glucagon (human recombinant)    ipratropium-albuterol    Magnesium Cardiology Dose Replacement - Follow Nurse / BPA Driven Protocol    Morphine **AND** naloxone    nicotine polacrilex    nitroglycerin    ondansetron    Pharmacy to dose vancomycin    Phosphorus Replacement - Follow Nurse / BPA Driven Protocol    Potassium Replacement - Follow Nurse / BPA Driven  Protocol    sodium chloride    traZODone    Assessment & Plan   Assessment & Plan     Active Hospital Problems    Diagnosis  POA    **Pericarditis [I31.9]  Yes    Mass of upper lobe of right lung [R91.8]  Yes    Type 2 diabetes mellitus without complication, without long-term current use of insulin [E11.9]  Yes    Personal history of nicotine dependence [Z87.891]  Not Applicable    Sepsis with acute organ dysfunction [A41.9, R65.20]  Yes    Coronary artery disease involving native coronary artery of native heart [I25.10]  Yes    Essential hypertension [I10]  Yes      Resolved Hospital Problems   No resolved problems to display.        Brief Hospital Course to date:  Wilmer Stover is a 50 y.o. male history of Hodgkin's lymphoma (in remission for 13-14 years), CAD s/p recent cardiac stent placement, and diabetes, presenting with recurrent headache, dizziness, chest pain, and breathing difficulties that started yesterday and continued today. He reports chest pain mostly on inspiration.     Enlarging Pericardial Effusion  Recurrent Pericarditis  - repeated limited echo showing enlarging pericardial effusion, no evidence for tamponade  - Cardiology following, started on high dose ASA, continue colchicine  - NPO after midnight in case pericardiocentesis needed  - CRP/ESR elevated    RUL Pulmonary Mass  - CT scan showing right apical opacity abutting the pleura with lobulated margins measuring 2.6 cm x 2.6 cm. Pulm consulted for further evaluation, may need biopsy given smoking hx.    Low Grade Fever:  - no obvious infectious source currently  - CT chest w/out pneumonia, UA negative, resp PCR negative, MRSA PCR negative  - GI PCR and blood cultures pending  - d/c broad spectrum abx and monitor, low grade fever may be related to above process vs. Underlying undiagnosed malignany.    CAD s/p recent PCI  - continue DAPT  - continue statin, Beta-blocker    Elevated D-dimer:  - CTA negative for PE, Duplex negative for  DVT  - d/c heparin    Tobacco Abuse:  - nicotine replacement  - cessation counseling    DMII:  - SSI coverage  - monitor FSBS    HTN:  - continue current regimen    HLD:  - continue statin    Expected Discharge Location and Transportation: Home  Expected Discharge   Expected Discharge Date: 7/29/2025; Expected Discharge Time: 10:00 AM     VTE Prophylaxis:  Mechanical VTE prophylaxis orders are present.         AM-PAC 6 Clicks Score (PT): 24 (07/26/25 2315)    CODE STATUS:   Code Status and Medical Interventions: CPR (Attempt to Resuscitate); Full Support   Ordered at: 07/26/25 4951     Code Status (Patient has no pulse and is not breathing):    CPR (Attempt to Resuscitate)     Medical Interventions (Patient has pulse or is breathing):    Full Support     Level Of Support Discussed With:    Patient       Ana Mcfarlane DO  07/27/25

## 2025-07-27 NOTE — CONSULTS
Referring Provider: Dr. Mcfarlane  Reason for Consultation: Lung mass    Patient Care Team:  Mata Maher MD as PCP - General (Family Medicine)    Chief complaint: Chest pain    Subjective .     History of present illness:  50-year-old male who presented to our ER with pleuritic type chest pain.    He has a past medical history for Hodgkin's lymphoma which was treated at  about 14 years ago with radiation and chemotherapy as well as type 2 diabetes mellitus.  He also recently had a cardiac stent placement on 7/11 after presenting with 3 days of chest pain.  He was diagnosed with a non-STEMI and pericarditis at the time.  He was discharged on colchicine.    He was admitted for his worsening symptoms and an echocardiogram was ordered.  Cardiology has seen him in consultation and is considering a drainage procedure.  During the course of his evaluation he had a CT scan of the chest and a 2.6 cm lobulated density was identified.    Pulmonary was asked see him in consultation.  He is a longstanding smoker since age 15.  He has no known diagnosed lung disease.  He has no history of tuberculosis or histoplasmosis that he knows of.    Review of Systems  Pertinent items are noted in HPI    History  Past Medical History:   Diagnosis Date    Allergic     Anxiety     Arthritis     Asthma     COPD (chronic obstructive pulmonary disease)     Depression     Gall stones     Hepatitis C     Hodgkin's lymphoma     Hyperlipidemia     Hypertension     Kidney stone     Memory loss     Neuropathy     Numbness and tingling     Thyroid disease     Weakness    ,   Past Surgical History:   Procedure Laterality Date    CARDIAC CATHETERIZATION N/A 7/12/2025    Procedure: Left Heart Cath;  Surgeon: Slick Lama IV, MD;  Location: [facility] CATH INVASIVE LOCATION;  Service: Cardiovascular;  Laterality: N/A;    CARDIAC CATHETERIZATION N/A 7/12/2025    Procedure: Optical Coherence Tomography;  Surgeon: Slick Lama IV  "MD;  Location:  [facility] CATH INVASIVE LOCATION;  Service: Cardiovascular;  Laterality: N/A;    CARDIAC CATHETERIZATION N/A 7/12/2025    Procedure: Stent LETICIA coronary;  Surgeon: Slick Lama IV, MD;  Location:  [facility] CATH INVASIVE LOCATION;  Service: Cardiovascular;  Laterality: N/A;    CHOLECYSTECTOMY      CYSTOSCOPY BLADDER STONE LITHOTRIPSY      MASTECTOMY MODIFIED RADICAL W/ AXILLARY LYMPH NODES W/ OR W/O PECTORALIS MINOR     ,   Family History   Problem Relation Age of Onset    Hypertension Mother     Hyperlipidemia Mother     Alcohol abuse Mother     Arthritis Mother     Alcohol abuse Father     Heart disease Maternal Grandmother    , and   Social History     Socioeconomic History    Marital status:    Tobacco Use    Smoking status: Some Days     Current packs/day: 0.50     Types: Cigarettes    Smokeless tobacco: Never   Vaping Use    Vaping status: Never Used   Substance and Sexual Activity    Alcohol use: No    Drug use: Not Currently     Types: Methamphetamines, Oxycodone, Heroin, Hydrocodone, Hydromorphone, Ketamine, Morphine, Fentanyl, Marijuana     Comment: sobriety date 12/22/2022    Sexual activity: Defer     E-cigarette/Vaping    E-cigarette/Vaping Use Never User      E-cigarette/Vaping Substances     E-cigarette/Vaping Devices         Objective     Vital Signs   Temp:  [97.9 °F (36.6 °C)-99.3 °F (37.4 °C)] 98.1 °F (36.7 °C)  Heart Rate:  [] 77  Resp:  [16-18] 16  BP: ()/() 111/81    Physical Exam:   Objective:  General Appearance:  In no acute distress.    Vital signs: (most recent): Blood pressure 111/81, pulse 77, temperature 98.1 °F (36.7 °C), temperature source Oral, resp. rate 16, height 167.6 cm (65.98\"), weight 81.6 kg (179 lb 14.3 oz), SpO2 92%.    HEENT: Normal HEENT exam.    Lungs:  Normal effort and normal respiratory rate.  Breath sounds clear to auscultation.  He is not in respiratory distress.  No rales, wheezes or rhonchi.    Heart: Normal rate.  " Regular rhythm.  S1 normal and S2 normal.  No murmur, gallop or friction rub.   Chest: Symmetric chest wall expansion.   Abdomen: Abdomen is soft and non-distended.  Bowel sounds are normal.   There is no abdominal tenderness.   There is no mass. There is no splenomegaly. There is no hepatomegaly.   Extremities: There is no deformity or dependent edema.    Neurological: Patient is alert and oriented to person, place and time.    Pupils:  Pupils are equal, round, and reactive to light.    Skin:  Warm and dry.                Results Review:   I reviewed the patient's new clinical results.  I reviewed the patient's new imaging results and agree with the interpretation.  I reviewed the patient's other test results and agree with the interpretation      Assessment & Plan     Active Hospital Problems    Diagnosis     **Pericarditis     Mass of upper lobe of right lung     Type 2 diabetes mellitus without complication, without long-term current use of insulin     Personal history of nicotine dependence     Sepsis with acute organ dysfunction     Coronary artery disease involving native coronary artery of native heart     Essential hypertension        50-year-old male admitted for further evaluation of pericarditis with worsening symptoms and a pericardial effusion.  A CT scan of the chest was performed and he was incidentally found to have a 2.6 cm lobulated mass high in the right upper lobe near the pleura.    He has no previous CAT scans for comparison.  He did have Hodgkin's lymphoma 15 years ago and was treated at .  A review of chest x-rays in his current record dating back to 2022 reveals the possible presence of this mass though it is difficult to discern clearly this high in the upper lobe.  I suspect it was potentially present though less prominent in 2022.    Ideally he would have a biopsy, either a robotic bronchoscopy or a transthoracic needle aspiration given the location.  However, he is on Plavix and this  was recently started for a LETICIA placed on 7/11.  This would be difficult to hold prior to 3 months of therapy unless cardiology felt comfortable with that.    A reasonable approach may be to schedule a PET scan as an outpatient and then a follow-up CT scan and consider holding the Plavix after 90 days if cardiology agrees and proceed with a biopsy if the scans are indicative of a potentially malignant process.    Recommendations:    As above  We will follow-up and advise further    I discussed the patients findings and my recommendations with patient and family      Narciso Prado MD  Pulmonary and Critical Care Medicine  07/27/25  16:11 EDT

## 2025-07-27 NOTE — CONSULTS
Pharmacy Consult - Vancomycin Dosing and Monitoring    Wilmer Stover is a 50 y.o. male receiving vancomycin therapy.     Indication: PNA  Consulting Provider: Hospitalist  ID Consult: N    Goal AUC: 400-600 mg/L*hr    Current Antimicrobial Therapy  Anti-Infectives (From admission, onward)      Ordered     Dose/Rate Route Frequency Start Stop    07/26/25 2134  cefepime 2000 mg IVPB in 100 mL NS (MBP)        Ordering Provider: Sushma Arevalo MD    2,000 mg  over 4 Hours Intravenous Every 8 Hours 07/27/25 0600 08/01/25 0559    07/26/25 2045  aztreonam (AZACTAM) 2 g in sodium chloride 0.9 % 100 mL MBP  Status:  Discontinued        Ordering Provider: Sushma Arevalo MD    2 g  over 4 Hours Intravenous Every 8 Hours 07/27/25 0530 07/26/25 2134    07/26/25 2134  cefepime 2000 mg IVPB in 100 mL NS (MBP)        Ordering Provider: Sushma Arevalo MD    2,000 mg  over 30 Minutes Intravenous Once 07/26/25 2230 07/26/25 2134  vancomycin IVPB 1750 mg in 0.9% Sodium Chloride (premix) 500 mL        Ordering Provider: Sushma Arevalo MD    20 mg/kg × 81.6 kg  285.7 mL/hr over 105 Minutes Intravenous Once 07/26/25 2150 07/26/25 2134  Pharmacy to dose vancomycin        Ordering Provider: Sushma Arevalo MD     Not Applicable Continuous PRN 07/26/25 2134 07/31/25 2133    07/26/25 2045  aztreonam (AZACTAM) 2 g in sodium chloride 0.9 % 100 mL MBP  Status:  Discontinued        Ordering Provider: Sushma Arevalo MD    2 g  200 mL/hr over 30 Minutes Intravenous Once 07/26/25 2101 07/26/25 2134 07/26/25 2045  Linezolid (ZYVOX) 600 mg 300 mL  Status:  Discontinued        Ordering Provider: Sushma Arevalo MD    600 mg  300 mL/hr over 60 Minutes Intravenous Every 12 Hours Scheduled 07/26/25 2101 07/26/25 2134 07/26/25 1917  cefTRIAXone (ROCEPHIN) 1,000 mg in sodium chloride 0.9 % 100 mL MBP        Ordering Provider: Chris Hernandez MD    1,000 mg  200 mL/hr over 30 Minutes Intravenous Once 07/26/25  "1933 07/26/25 2017 07/26/25 1917  azithromycin (ZITHROMAX) tablet 500 mg        Ordering Provider: Chris Hernandez MD    500 mg Oral Once 07/26/25 1933 07/26/25 1938          Allergies  Allergies as of 07/26/2025 - Reviewed 07/26/2025   Allergen Reaction Noted    Penicillins Hives and Unknown (See Comments) 11/23/2017     Labs  Results from last 7 days   Lab Units 07/26/25  1705   BUN mg/dL 8.7   CREATININE mg/dL 0.98     Results from last 7 days   Lab Units 07/26/25  1705   WBC 10*3/mm3 16.52*     Evaluation of Dosing     Last Dose Received in the ED/Outside Facility: N/A  Is Patient on Dialysis or Renal Replacement: N    Height - 167.6 cm (66\")  Weight - 81.6 kg (180 lb)    Estimated Creatinine Clearance: 90.4 mL/min (by C-G formula based on SCr of 0.98 mg/dL).    No intake/output data recorded.    Microbiology and Radiology  Microbiology Results (last 10 days)       Procedure Component Value - Date/Time    COVID PRE-OP / PRE-PROCEDURE SCREENING ORDER (NO ISOLATION) - Swab, Nasopharynx [[id number]]  (Normal) Collected: 07/26/25 1721    Lab Status: Final result Specimen: Swab from Nasopharynx Updated: 07/26/25 1841    Narrative:      The following orders were created for panel order COVID PRE-OP / PRE-PROCEDURE SCREENING ORDER (NO ISOLATION) - Swab, Nasopharynx.  Procedure                               Abnormality         Status                     ---------                               -----------         ------                     Respiratory Panel PCR w/...[[id number]]  Normal              Final result                 Please view results for these tests on the individual orders.    Respiratory Panel PCR w/COVID-19(SARS-CoV-2) [facility] In-House, NP Swab in UTM/VTM, 2 HR TAT - Swab, Nasopharynx [[id number]]  (Normal) Collected: 07/26/25 1721    Lab Status: Final result Specimen: Swab from Nasopharynx Updated: 07/26/25 1841     ADENOVIRUS, PCR Not Detected     Coronavirus 229E Not Detected     " Coronavirus HKU1 Not Detected     Coronavirus NL63 Not Detected     Coronavirus OC43 Not Detected     COVID19 Not Detected     Human Metapneumovirus Not Detected     Human Rhinovirus/Enterovirus Not Detected     Influenza A PCR Not Detected     Influenza B PCR Not Detected     Parainfluenza Virus 1 Not Detected     Parainfluenza Virus 2 Not Detected     Parainfluenza Virus 3 Not Detected     Parainfluenza Virus 4 Not Detected     RSV, PCR Not Detected     Bordetella pertussis pcr Not Detected     Bordetella parapertussis PCR Not Detected     Chlamydophila pneumoniae PCR Not Detected     Mycoplasma pneumo by PCR Not Detected    Narrative:      In the setting of a positive respiratory panel with a viral infection PLUS a negative procalcitonin without other underlying concern for bacterial infection, consider observing off antibiotics or discontinuation of antibiotics and continue supportive care. If the respiratory panel is positive for atypical bacterial infection (Bordetella pertussis, Chlamydophila pneumoniae, or Mycoplasma pneumoniae), consider antibiotic de-escalation to target atypical bacterial infection.          Reported Vancomycin Levels              [employer] AUC Calculation:    Current AUC:  mg/L*hr    Predicted Steady State AUC on Current Dose:  mg/L*hr  _________________________________    Predicted Steady State AUC on New Dose: 492 mg/L*hr    Assessment/Plan:     Moderate age male with renal markers WNL. Pericarditis. Empiric PNA coverage; recent PCI, concern for Pseudomonal risk.     Cefepime 2 gm q8h per extended infusion protocol.   Vancomycin 1750 mg IV load tonight  MRSA PCR  Start vancomycin 1000 mg IV q12h tomorrow at noon.     Adjustments of plan pending reassessment by rounding pharmacist.    Thank you for this consult.  Brandon Odell IV, PharmD, BCPS  7/26/2025  21:39 EDT

## 2025-07-27 NOTE — H&P
".    [facility] Medicine Services  HISTORY AND PHYSICAL    Patient Name: Wilmer Stover  : 1975  MRN: [id number]  Primary Care Physician: Mata Maher MD  Date of admission: 2025      Subjective   Subjective     Chief Complaint:  Chest pain    HPI:  Wilmer Stover is a 50 y.o. male history of Hodgkin's lymphoma (in remission for 13-14 years), CAD s/p recent cardiac stent placement, and diabetes, presenting with recurrent headache, dizziness, chest pain, and breathing difficulties that started yesterday and continued today. He reports chest pain mostly on inspiration.    The patient was seen a few days ago for similar symptoms and was diagnosed with a \"blockage.\"  Patient was recently discharged from [facility] on  during that hospital admission he underwent a left heart cath and had 1 drug-eluting stent placed in the proximal RCA.  He was also discovered to have a moderate-sized pericardial effusion and discharged on colchicine; which he has been taking daily as prescribed. An echocardiogram performed a week ago showed a medium amount of fluid around the heart. Today's CT scan of the lungs suggests the fluid may have increased.     Associated symptoms include night sweats, low-grade fever (noted on admission), and diarrhea (2-3 loose stools per day). The patient reports dark orange urine, but is unsure if it's due to dehydration or blood. He experienced dizziness this morning and has had a little coughing. He denies vomiting or coughing up blood.    The patient's appetite has been poor, and he hasn't eaten anything in the past two days. He reports some abdominal discomfort, primarily in the lower quadrant area. His legs are tender near the ankles. He experiences pain when taking deep breaths and has difficulty lying flat, usually sleeping on one side due to tailbone pain when on his back.    Regarding lifestyle factors, the patient currently " smokes 4-6 cigarettes per day, having started at age 15 or 16, and expresses interest in quitting. He denies current alcohol use. He has a history of cocaine use but reports being clean for almost 3 years.    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 He saw his primary care physician, Dr. Blanchard, about a week ago. Initially, he felt better after his recent hospital discharge, but his symptoms have since recurred.    Review of Systems  General: Positive for fever, night sweats, decreased appetite, and weight loss. Negative for chills.  HEENT: Positive for headache.  Cardiovascular: Positive for chest pain.  Respiratory: Positive for cough and dyspnea.  Gastrointestinal: Positive for diarrhea. Negative for vomiting.  Genitourinary: Positive for dark urine.  Musculoskeletal: Positive for ankle tenderness.  Neurological: Positive for dizziness and near-syncope.  Personal History     Past Medical History:   Diagnosis Date    Allergic     Anxiety     Arthritis     Asthma     COPD (chronic obstructive pulmonary disease)     Depression     Gall stones     Hepatitis C     Hodgkin's lymphoma     Hyperlipidemia     Hypertension     Kidney stone     Memory loss     Neuropathy     Numbness and tingling     Thyroid disease     Weakness            Past Surgical History:   Procedure Laterality Date    CARDIAC CATHETERIZATION N/A 7/12/2025    Procedure: Left Heart Cath;  Surgeon: Slick Lama IV, MD;  Location: [facility] CATH INVASIVE LOCATION;  Service: Cardiovascular;  Laterality: N/A;    CARDIAC CATHETERIZATION N/A 7/12/2025    Procedure: Optical Coherence Tomography;  Surgeon: Slick Lama IV, MD;  Location:  [facility] CATH INVASIVE LOCATION;  Service: Cardiovascular;  Laterality: N/A;    CARDIAC CATHETERIZATION N/A 7/12/2025    Procedure: Stent LETICIA coronary;  Surgeon: Slick Lama IV, MD;  Location:  [facility] CATH INVASIVE LOCATION;   Service: Cardiovascular;  Laterality: N/A;    CHOLECYSTECTOMY      CYSTOSCOPY BLADDER STONE LITHOTRIPSY      MASTECTOMY MODIFIED RADICAL W/ AXILLARY LYMPH NODES W/ OR W/O PECTORALIS MINOR         Family History: family history includes Alcohol abuse in his father and mother; Arthritis in his mother; Heart disease in his maternal grandmother; Hyperlipidemia in his mother; Hypertension in his mother.     Social History:  reports that he has been smoking cigarettes. He has never used smokeless tobacco. He reports that he does not currently use drugs after having used the following drugs: Methamphetamines, Oxycodone, Heroin, Hydrocodone, Hydromorphone, Ketamine, Morphine, Fentanyl, and Marijuana. He reports that he does not drink alcohol.  Social History     Social History Narrative    Not on file       Medications:  Available home medication information reviewed.  FLUoxetine, albuterol sulfate HFA, amLODIPine, aspirin, carvedilol, clopidogrel, colchicine, famotidine, losartan, nitroglycerin, rosuvastatin, and traZODone    Allergies   Allergen Reactions    Penicillins Hives and Unknown (See Comments)       Objective   Objective     Vital Signs:   Temp:  [97.9 °F (36.6 °C)-100.9 °F (38.3 °C)] 97.9 °F (36.6 °C)  Heart Rate:  [] 86  Resp:  [18-20] 18  BP: ()/() 127/85       Physical Exam  Constitutional:       General: He is not in acute distress.     Appearance: Normal appearance.   HENT:      Head: Normocephalic and atraumatic.      Right Ear: External ear normal.      Left Ear: External ear normal.      Nose: Nose normal.      Mouth/Throat:      Mouth: Mucous membranes are moist.      Pharynx: Oropharynx is clear.   Eyes:      Extraocular Movements: Extraocular movements intact.      Pupils: Pupils are equal, round, and reactive to light.   Cardiovascular:      Rate and Rhythm: Normal rate and regular rhythm.      Pulses: Normal pulses.      Heart sounds: Murmur heard.   Pulmonary:      Effort:  Pulmonary effort is normal. No respiratory distress.      Breath sounds: No wheezing or rales.      Comments: Diminished BS in b/l posterior field  Abdominal:      General: Abdomen is flat. Bowel sounds are normal. There is no distension.      Palpations: Abdomen is soft.      Tenderness: There is no abdominal tenderness. There is no guarding.   Genitourinary:     Comments: deferred  Musculoskeletal:         General: Normal range of motion.      Cervical back: Normal range of motion and neck supple. No rigidity.      Right lower leg: No edema.      Left lower leg: No edema.   Skin:     General: Skin is warm and dry.      Capillary Refill: Capillary refill takes less than 2 seconds.      Coloration: Skin is not jaundiced or pale.   Neurological:      General: No focal deficit present.      Mental Status: He is alert and oriented to person, place, and time.      Cranial Nerves: No cranial nerve deficit.      Sensory: No sensory deficit.      Motor: No weakness.   Psychiatric:         Mood and Affect: Mood normal.         Behavior: Behavior normal.            Result Review:  I have personally reviewed the results from the time of this admission to 7/27/2025 00:00 EDT and agree with these findings:  [x]  Laboratory list / accordion  []  Microbiology  []  Radiology  [x]  EKG/Telemetry   [x]  Cardiology/Vascular   []  Pathology  []  Old records  []  Other:  Most notable findings include:  s/p LHC with 1 drug-eluting stent placed in proximal RCA  - Echo 7/13/25: LVEF 55%, grade 1 diastolic dysfunction, moderate AR, 1-2 cm pericardial effusion without tamponade  LAB RESULTS:      Lab 07/26/25  2239 07/26/25  1705   WBC  --  16.52*   HEMOGLOBIN  --  15.1   HEMATOCRIT  --  45.0   PLATELETS  --  599*   NEUTROS ABS  --  13.11*   IMMATURE GRANS (ABS)  --  0.06*   LYMPHS ABS  --  1.59   MONOS ABS  --  1.57*   EOS ABS  --  0.12   MCV  --  86.5   PROCALCITONIN  --  0.06   LACTATE 0.7  --    D DIMER QUANT  --  5.75*         Lab  07/26/25 1809 07/26/25  1705   SODIUM  --  136   POTASSIUM  --  3.9   CHLORIDE  --  99   CO2  --  23.5   ANION GAP  --  13.5   BUN  --  8.7   CREATININE  --  0.98   EGFR  --  93.9   GLUCOSE  --  126*   CALCIUM  --  9.8   MAGNESIUM 2.2  --    PHOSPHORUS 3.2  --          Lab 07/26/25  1705   TOTAL PROTEIN 8.6*   ALBUMIN 4.3   GLOBULIN 4.3   ALT (SGPT) 19   AST (SGOT) 24   BILIRUBIN 0.9   ALK PHOS 114   LIPASE 18         Lab 07/26/25 2239 07/26/25  1809 07/26/25  1705   PROBNP  --  223.0 190.0   HSTROP T 43* 10 9                 Lab 07/26/25  2358   PH, ARTERIAL 7.438   PCO2, ARTERIAL 35.2   PO2 ART 74.1*   FIO2 21   HCO3 ART 23.8   BASE EXCESS ART 0.0   CARBOXYHEMOGLOBIN 1.6         Microbiology Results (last 10 days)       Procedure Component Value - Date/Time    COVID PRE-OP / PRE-PROCEDURE SCREENING ORDER (NO ISOLATION) - Swab, Nasopharynx [[id number]]  (Normal) Collected: 07/26/25 1721    Lab Status: Final result Specimen: Swab from Nasopharynx Updated: 07/26/25 1841    Narrative:      The following orders were created for panel order COVID PRE-OP / PRE-PROCEDURE SCREENING ORDER (NO ISOLATION) - Swab, Nasopharynx.  Procedure                               Abnormality         Status                     ---------                               -----------         ------                     Respiratory Panel PCR w/...[[id number]]  Normal              Final result                 Please view results for these tests on the individual orders.    Respiratory Panel PCR w/COVID-19(SARS-CoV-2) [facility] In-House, NP Swab in UTM/VTM, 2 HR TAT - Swab, Nasopharynx [[id number]]  (Normal) Collected: 07/26/25 1721    Lab Status: Final result Specimen: Swab from Nasopharynx Updated: 07/26/25 1841     ADENOVIRUS, PCR Not Detected     Coronavirus 229E Not Detected     Coronavirus HKU1 Not Detected     Coronavirus NL63 Not Detected     Coronavirus OC43 Not Detected     COVID19 Not Detected     Human Metapneumovirus Not  Detected     Human Rhinovirus/Enterovirus Not Detected     Influenza A PCR Not Detected     Influenza B PCR Not Detected     Parainfluenza Virus 1 Not Detected     Parainfluenza Virus 2 Not Detected     Parainfluenza Virus 3 Not Detected     Parainfluenza Virus 4 Not Detected     RSV, PCR Not Detected     Bordetella pertussis pcr Not Detected     Bordetella parapertussis PCR Not Detected     Chlamydophila pneumoniae PCR Not Detected     Mycoplasma pneumo by PCR Not Detected    Narrative:      In the setting of a positive respiratory panel with a viral infection PLUS a negative procalcitonin without other underlying concern for bacterial infection, consider observing off antibiotics or discontinuation of antibiotics and continue supportive care. If the respiratory panel is positive for atypical bacterial infection (Bordetella pertussis, Chlamydophila pneumoniae, or Mycoplasma pneumoniae), consider antibiotic de-escalation to target atypical bacterial infection.            CT Angiogram Chest Pulmonary Embolism  Result Date: 7/26/2025  CT ANGIOGRAM CHEST PULMONARY EMBOLISM Date of Exam: 7/26/2025 6:35 PM EDT Indication: Pulmonary Embolism. Comparison: None available. Technique: Axial CT images were obtained of the chest after the uneventful intravenous administration of 75 cc Isovue-370 IV contrast utilizing pulmonary embolism protocol.  In addition, a 3-D volume rendered image was created for interpretation.  Reconstructed coronal and sagittal images were also obtained. Automated exposure control and iterative construction methods were used. Findings: There is a right apical opacity abutting the pleura with lobulated margins measuring 2.6 cm x 2.6 cm concerning for a mass. Normal atelectatic changes. Left upper lobe bandlike atelectasis. Large pericardial effusion with trace bilateral pleural fluid. No pneumothorax. No evidence of aortic dissection. No pulmonary embolus. Atheromatous disease of the coronary vessels.  No mediastinal, perihilar, or axillary adenopathy. Thoracic vertebral body height and alignment are normal. The sternum is intact. No rib fractures are seen.     Impression: 1. Large pericardial effusion. No pulmonary embolus. 2. Lobulated right apical opacity during 2.6 cm x 2.6 cm concerning for a possible mass. PET/CT and/or biopsy recommended. Electronically Signed: Kane Villeda MD  7/26/2025 7:30 PM EDT  Workstation ID: [id number]    XR Chest 1 View  Result Date: 7/26/2025  XR CHEST 1 VW Date of Exam: 7/26/2025 3:30 PM EDT Indication: Chest Pain Triage Protocol Comparison: Chest radiograph dated 7/11/2025 Findings: There is bandlike atelectasis within the left midlung. There is streaky left basilar airspace opacity which is new from prior examination. This could represent atelectasis or pneumonia. The right lung is clear. There is no pleural effusion or pneumothorax. There are degenerative changes of the thoracic spine.     Impression: Impression: 1.Streaky left basilar airspace opacity which could represent atelectasis or pneumonia. 2.Bandlike atelectasis within the left midlung. Electronically Signed: Padilla Reyes MD  7/26/2025 3:59 PM EDT  Workstation ID: [id number]      Results for orders placed during the hospital encounter of 07/11/25    Adult Transthoracic Echo Complete W/ Cont if Necessary Per Protocol 07/13/2025 11:26 AM    Interpretation Summary    Left ventricular systolic function is normal. Estimated left ventricular EF = 55%    Left ventricular wall thickness is consistent with hypertrophy. Sigmoid-shaped ventricular septum is present.    Left ventricular diastolic function is consistent with (grade I) impaired relaxation.    Moderate aortic valve regurgitation is present.    There is a moderate (1-2cm) pericardial effusion. There is no evidence of cardiac tamponade.      Assessment & Plan   Assessment & Plan       Pericarditis    Mass of upper lobe of right lung    Essential hypertension     Coronary artery disease involving native coronary artery of native heart    Type 2 diabetes mellitus without complication, without long-term current use of insulin    Personal history of nicotine dependence    Sepsis with acute organ dysfunction      Pericardial Effusion / Subacute pericarditis  Assessment: Patient was recently diagnosed with a medium amount of pericardial effusion on echocardiogram and prescribed colchicine. Despite daily colchicine use, patient returned with worsening symptoms including chest pain, dyspnea, and dizziness. Recent CT scan suggests possible increase in pericardial fluid. Patient reports low-grade fever and night sweats, raising concern for potential infection or inflammatory process. Differential diagnoses include medication-refractory pericarditis, infectious pericarditis, dressler syndrome, or malignant effusion given history of Hodgkin's lymphoma and new lung mass.  Plan:  - Admit to hospital and monitor on telemetry  - Repeat echocardiogram to assess current fluid volume  - Cardiology consultation for evaluation in the am  patient may benefit from pericardiocentesis - therapeutic drainage may improve symptoms and specimens will help evaluate etiology of effusion  - Continue colchicine 0.6mg daily   -Continue to trend troponins       2. RUL Pulmonary Mass  Assessment: CT scan: a right apical opacity abutting the pleura with lobulated margins measuring 2.6 cm x 2.6 cm concerning for a mass . Given patient's history of smoking and previous Hodgkin's lymphoma, differential diagnoses include infectious process (pneumonia) or malignancy.  Plan:  - Pulmonology consultation for evaluation and possible biopsy  -D-dimer elevated --> ct scan reported negative for pulmonary embolus; will obtain lower extremity Dopplers  -low threshold to start full dose anticoagulation for possible thromboembolic disease: heparin gtt  discontinue if Lower extremity dopplers are negative    3. Nicotine  Dependence  Assessment: Patient reports smoking 4-6 cigarettes per day, with onset at age 15-16. Expresses interest in smoking cessation.  Plan:  - Offer nicotine replacement therapy (patch or gum)  - Provide smoking cessation counseling  - Patient at risk for obstructive lung disease including COPD will add duonebs as needed    4. T2DM  Assessment: Patient reports history of diabetes. Recent poor appetite and minimal food intake over the past two days may impact glycemic control. Last A1C was 5.3  Plan:  - Resume diet as tolerated  - Monitor blood glucose levels during hospitalization  poc tid and qhs   - Insulin sliding scale    5. CAD  Hx of NSTEMI s/p stent, HTN, and Hyperlipidemia  Assessment: Patient reports taking medications for blood pressure and cholesterol management.  Plan:  - Continue current antihypertensive and lipid-lowering medications  - Continue losartan, norvasc, and crestor  - Continue aspirin and plavix    6. Sepsis   Assessment: Associated symptoms include night sweats, low-grade fever ( 100.9F noted on admission), and diarrhea (2-3 loose stools per day). Source unclear at this time.  Plan:  - Initiate sepsis protocol  - IV fluids and trend serial lactate levels and an empiric broad-spectrum antibiotics with vancomycin and cefepime  - Send blood cultures, check respiratory panel, UA, check GI panel, KUB  - If cultures returned negative plan to de-escalate antibiotics within 48 hours      Total time spent: 40 minutes  Time spent includes time reviewing chart, face-to-face time, counseling patient/family/caregiver, ordering medications/tests/procedures, communicating with other health care professionals, documenting clinical information in the electronic health record, and coordination of care.   VTE Prophylaxis:  Pharmacologic & mechanical VTE prophylaxis orders are present.          CODE STATUS:    Code Status and Medical Interventions: CPR (Attempt to Resuscitate); Full Support   Ordered  at: 07/26/25 2356     Code Status (Patient has no pulse and is not breathing):    CPR (Attempt to Resuscitate)     Medical Interventions (Patient has pulse or is breathing):    Full Support     Level Of Support Discussed With:    Patient       Expected Discharge   Expected Discharge Date: 7/29/2025; Expected Discharge Time: 10:00 AM     Sushma Arevalo MD  07/27/25

## 2025-07-28 ENCOUNTER — READMISSION MANAGEMENT (OUTPATIENT)
Dept: CALL CENTER | Facility: HOSPITAL | Age: 50
End: 2025-07-28
Payer: MEDICAID

## 2025-07-28 LAB
ANION GAP SERPL CALCULATED.3IONS-SCNC: 12 MMOL/L (ref 5–15)
BASOPHILS # BLD AUTO: 0.07 10*3/MM3 (ref 0–0.2)
BASOPHILS NFR BLD AUTO: 0.6 % (ref 0–1.5)
BUN SERPL-MCNC: 6.3 MG/DL (ref 6–20)
BUN/CREAT SERPL: 7.2 (ref 7–25)
CALCIUM SPEC-SCNC: 9.2 MG/DL (ref 8.6–10.5)
CHLORIDE SERPL-SCNC: 104 MMOL/L (ref 98–107)
CO2 SERPL-SCNC: 23 MMOL/L (ref 22–29)
CREAT SERPL-MCNC: 0.88 MG/DL (ref 0.76–1.27)
DEPRECATED RDW RBC AUTO: 37.1 FL (ref 37–54)
EGFRCR SERPLBLD CKD-EPI 2021: 104.8 ML/MIN/1.73
EOSINOPHIL # BLD AUTO: 0.33 10*3/MM3 (ref 0–0.4)
EOSINOPHIL NFR BLD AUTO: 2.8 % (ref 0.3–6.2)
ERYTHROCYTE [DISTWIDTH] IN BLOOD BY AUTOMATED COUNT: 11.4 % (ref 12.3–15.4)
GLUCOSE BLDC GLUCOMTR-MCNC: 82 MG/DL (ref 70–130)
GLUCOSE BLDC GLUCOMTR-MCNC: 90 MG/DL (ref 70–130)
GLUCOSE BLDC GLUCOMTR-MCNC: 91 MG/DL (ref 70–130)
GLUCOSE BLDC GLUCOMTR-MCNC: 93 MG/DL (ref 70–130)
GLUCOSE SERPL-MCNC: 89 MG/DL (ref 65–99)
HCT VFR BLD AUTO: 38.2 % (ref 37.5–51)
HGB BLD-MCNC: 12.7 G/DL (ref 13–17.7)
IMM GRANULOCYTES # BLD AUTO: 0.05 10*3/MM3 (ref 0–0.05)
IMM GRANULOCYTES NFR BLD AUTO: 0.4 % (ref 0–0.5)
LYMPHOCYTES # BLD AUTO: 1.97 10*3/MM3 (ref 0.7–3.1)
LYMPHOCYTES NFR BLD AUTO: 16.8 % (ref 19.6–45.3)
MCH RBC QN AUTO: 29.2 PG (ref 26.6–33)
MCHC RBC AUTO-ENTMCNC: 33.2 G/DL (ref 31.5–35.7)
MCV RBC AUTO: 87.8 FL (ref 79–97)
MONOCYTES # BLD AUTO: 1.49 10*3/MM3 (ref 0.1–0.9)
MONOCYTES NFR BLD AUTO: 12.7 % (ref 5–12)
NEUTROPHILS NFR BLD AUTO: 66.7 % (ref 42.7–76)
NEUTROPHILS NFR BLD AUTO: 7.83 10*3/MM3 (ref 1.7–7)
NRBC BLD AUTO-RTO: 0 /100 WBC (ref 0–0.2)
PLATELET # BLD AUTO: 478 10*3/MM3 (ref 140–450)
PMV BLD AUTO: 9.3 FL (ref 6–12)
POTASSIUM SERPL-SCNC: 4.4 MMOL/L (ref 3.5–5.2)
RBC # BLD AUTO: 4.35 10*6/MM3 (ref 4.14–5.8)
SODIUM SERPL-SCNC: 139 MMOL/L (ref 136–145)
WBC NRBC COR # BLD AUTO: 11.74 10*3/MM3 (ref 3.4–10.8)

## 2025-07-28 PROCEDURE — 97161 PT EVAL LOW COMPLEX 20 MIN: CPT

## 2025-07-28 PROCEDURE — 99232 SBSQ HOSP IP/OBS MODERATE 35: CPT | Performed by: INTERNAL MEDICINE

## 2025-07-28 PROCEDURE — 80048 BASIC METABOLIC PNL TOTAL CA: CPT | Performed by: INTERNAL MEDICINE

## 2025-07-28 PROCEDURE — 85025 COMPLETE CBC W/AUTO DIFF WBC: CPT | Performed by: INTERNAL MEDICINE

## 2025-07-28 PROCEDURE — 97165 OT EVAL LOW COMPLEX 30 MIN: CPT

## 2025-07-28 PROCEDURE — 25010000002 MORPHINE PER 10 MG: Performed by: INTERNAL MEDICINE

## 2025-07-28 PROCEDURE — 82948 REAGENT STRIP/BLOOD GLUCOSE: CPT

## 2025-07-28 PROCEDURE — 99232 SBSQ HOSP IP/OBS MODERATE 35: CPT

## 2025-07-28 RX ADMIN — CARVEDILOL 3.12 MG: 3.12 TABLET, FILM COATED ORAL at 08:29

## 2025-07-28 RX ADMIN — MORPHINE SULFATE 1 MG: 2 INJECTION, SOLUTION INTRAMUSCULAR; INTRAVENOUS at 16:43

## 2025-07-28 RX ADMIN — CARVEDILOL 3.12 MG: 3.12 TABLET, FILM COATED ORAL at 17:10

## 2025-07-28 RX ADMIN — FAMOTIDINE 20 MG: 20 TABLET, FILM COATED ORAL at 08:29

## 2025-07-28 RX ADMIN — COLCHICINE 0.6 MG: 0.6 TABLET ORAL at 08:29

## 2025-07-28 RX ADMIN — COLCHICINE 0.6 MG: 0.6 TABLET ORAL at 21:43

## 2025-07-28 RX ADMIN — TRAZODONE HYDROCHLORIDE 100 MG: 100 TABLET ORAL at 00:02

## 2025-07-28 RX ADMIN — CLOPIDOGREL BISULFATE 75 MG: 75 TABLET, FILM COATED ORAL at 08:29

## 2025-07-28 RX ADMIN — LOSARTAN POTASSIUM 25 MG: 25 TABLET, FILM COATED ORAL at 08:29

## 2025-07-28 RX ADMIN — NICOTINE 1 PATCH: 14 PATCH TRANSDERMAL at 21:45

## 2025-07-28 RX ADMIN — ASPIRIN 650 MG: 325 TABLET, COATED ORAL at 21:43

## 2025-07-28 RX ADMIN — ASPIRIN 650 MG: 325 TABLET, COATED ORAL at 08:29

## 2025-07-28 RX ADMIN — AMLODIPINE BESYLATE 10 MG: 10 TABLET ORAL at 08:29

## 2025-07-28 RX ADMIN — FLUOXETINE HYDROCHLORIDE 10 MG: 10 CAPSULE ORAL at 08:28

## 2025-07-28 RX ADMIN — FAMOTIDINE 20 MG: 20 TABLET, FILM COATED ORAL at 21:43

## 2025-07-28 RX ADMIN — MORPHINE SULFATE 1 MG: 2 INJECTION, SOLUTION INTRAMUSCULAR; INTRAVENOUS at 11:17

## 2025-07-28 RX ADMIN — Medication 10 ML: at 08:29

## 2025-07-28 RX ADMIN — ROSUVASTATIN CALCIUM 20 MG: 20 TABLET, FILM COATED ORAL at 08:29

## 2025-07-28 RX ADMIN — TRAZODONE HYDROCHLORIDE 100 MG: 100 TABLET ORAL at 21:54

## 2025-07-28 RX ADMIN — MORPHINE SULFATE 1 MG: 2 INJECTION, SOLUTION INTRAMUSCULAR; INTRAVENOUS at 21:54

## 2025-07-28 NOTE — PLAN OF CARE
Goal Outcome Evaluation:              Outcome Evaluation: VSS on room air. oriented X 4. NSR. complains of pain, given prn meds.

## 2025-07-28 NOTE — THERAPY DISCHARGE NOTE
Patient Name: Wilmer Stover  : 1975    MRN: [id number]                              Today's Date: 2025       Admit Date: 2025    Visit Dx:     ICD-10-CM ICD-9-CM   1. Acute sepsis  A41.9 038.9     995.91   2. Pneumonia due to infectious organism, unspecified laterality, unspecified part of lung  J18.9 486   3. Acute pericarditis, unspecified type  I30.9 420.90   4. Pericardial effusion  I31.39 423.9   5. Smoker  F17.200 305.1     Patient Active Problem List   Diagnosis    NSTEMI (non-ST elevated myocardial infarction)    Essential hypertension    Coronary artery disease involving native coronary artery of native heart    Hyperlipidemia LDL goal <50    Hepatitis C    Acute pericarditis    Nonrheumatic aortic valve insufficiency    Pericarditis    Mass of upper lobe of right lung    Type 2 diabetes mellitus without complication, without long-term current use of insulin    Personal history of nicotine dependence    Sepsis with acute organ dysfunction     Past Medical History:   Diagnosis Date    Allergic     Anxiety     Arthritis     Asthma     COPD (chronic obstructive pulmonary disease)     Depression     Gall stones     Hepatitis C     Hodgkin's lymphoma     Hyperlipidemia     Hypertension     Kidney stone     Memory loss     Neuropathy     Numbness and tingling     Thyroid disease     Weakness      Past Surgical History:   Procedure Laterality Date    CARDIAC CATHETERIZATION N/A 2025    Procedure: Left Heart Cath;  Surgeon: Slick Lama IV, MD;  Location: [facility] CATH INVASIVE LOC[facility];  Service: Cardiovascular;  Laterality: N/A;    CARDIAC CATHETERIZATION N/A 2025    Procedure: Optical Coherence Tomography;  Surgeon: Slick Lama IV, MD;  Location:  [facility] CATH INVASIVE LOCATION;  Service: Cardiovascular;  Laterality: N/A;    CARDIAC CATHETERIZATION N/A 2025    Procedure: Stent LETICIA coronary;  Surgeon: Slick Lama IV, MD;  Location:   [facility] CATH INVASIVE LOCATION;  Service: Cardiovascular;  Laterality: N/A;    CHOLECYSTECTOMY      CYSTOSCOPY BLADDER STONE LITHOTRIPSY      MASTECTOMY MODIFIED RADICAL W/ AXILLARY LYMPH NODES W/ OR W/O PECTORALIS MINOR        General Information       Row Name 07/28/25 1507          Physical Therapy Time and Intention    Document Type discharge evaluation/summary  -ML     Mode of Treatment physical therapy  -ML       Row Name 07/28/25 1507          General Information    Patient Profile Reviewed yes  -ML     Prior Level of Function independent:;all household mobility;community mobility;gait;transfer;bed mobility;ADL's;home management;cooking;cleaning;driving;shopping;work  -ML     Existing Precautions/Restrictions fall  -ML     Barriers to Rehab medically complex  -ML       Row Name 07/28/25 1507          Living Environment    Current Living Arrangements home  -ML     People in Home --  Roommates  -ML       Row Name 07/28/25 1507          Home Main Entrance    Number of Stairs, Main Entrance one  -ML     Stair Railings, Main Entrance none  -ML       Row Name 07/28/25 1507          Stairs Within Home, Primary    Number of Stairs, Within Home, Primary other (see comments)  20  -ML     Stair Railings, Within Home, Primary railing on left side (ascending)  -ML       Row Name 07/28/25 1507          Cognition    Orientation Status (Cognition) oriented x 4  -ML       Row Name 07/28/25 1507          Safety Issues/Impairments Affecting Functional Mobility    Safety Issues Affecting Function (Mobility) safety precaution awareness  -ML     Impairments Affecting Function (Mobility) shortness of breath  -ML               User Key  (r) = Recorded By, (t) = Taken By, (c) = Cosigned By      Initials Name Provider Type    ML Kelly Norton Physical Therapist                   Mobility       Row Name 07/28/25 1508          Bed Mobility    Bed Mobility supine-sit  -ML     Supine-Sit [place] (Bed Mobility) independent  -ML       Row  Name 07/28/25 1508          Sit-Stand Transfer    Sit-Stand [place] (Transfers) independent  -ML       Row Name 07/28/25 1508          Gait/Stairs (Locomotion)    [place] Level (Gait) independent  -ML     Distance in Feet (Gait) 420  -ML     Comment, (Gait/Stairs) Patient demonstrates step through gait pattern, no loss of balance and noramlized gait speed.  -ML               User Key  (r) = Recorded By, (t) = Taken By, (c) = Cosigned By      Initials Name Provider Type    ML Kelly Norton Physical Therapist                   Obj/Interventions       Row Name 07/28/25 1510          Range of Motion Comprehensive    General Range of Motion no range of motion deficits identified  -ML       Row Name 07/28/25 1510          Strength Comprehensive (MMT)    General Manual Muscle Testing (MMT) Assessment no strength deficits identified  -ML       Row Name 07/28/25 1510          Balance    Balance Assessment sitting static balance;sitting dynamic balance;standing static balance;standing dynamic balance  -ML     Static Sitting Balance independent  -ML     Dynamic Sitting Balance independent  -ML     Position, Sitting Balance unsupported;sitting edge of bed  -ML     Static Standing Balance independent  -ML     Dynamic Standing Balance independent  -ML     Position/Device Used, Standing Balance unsupported  -ML     Balance Interventions sitting;standing;sit to stand;occupation based/functional task  -ML               User Key  (r) = Recorded By, (t) = Taken By, (c) = Cosigned By      Initials Name Provider Type    Kelly Alvarado Physical Therapist                   Goals/Plan    No documentation.                  Clinical Impression       Row Name 07/28/25 1510          Pain    Pretreatment Pain Rating 2/10  -ML     Posttreatment Pain Rating 2/10  -ML     Pain Location chest  -ML     Pain Side/Orientation left;generalized  -ML     Pain Management Interventions exercise or physical activity utilized;positioning techniques  utilized  -ML     Response to Pain Interventions activity participation with tolerable pain  -ML       Row Name 07/28/25 1510          Plan of Care Review    Plan of Care Reviewed With patient  -ML     Outcome Evaluation Physical therapy evaluation complete. The patient independent with transfers and ambulation. Patient presents near baseline for mobility, no acute PT needs identified.  -ML       Row Name 07/28/25 1510          Therapy Assessment/Plan (PT)    Criteria for Skilled Interventions Met (PT) no  -ML     Therapy Frequency (PT) evaluation only  -ML       Row Name 07/28/25 1510          Vital Signs    Pre Systolic BP Rehab 106  -ML     Pre Treatment Diastolic BP 72  -ML     Post Systolic BP Rehab 101  -ML     Post Treatment Diastolic BP 71  -ML     Pretreatment Heart Rate (beats/min) 101  -ML     Posttreatment Heart Rate (beats/min) 104  -ML     Pre SpO2 (%) 95  -ML     O2 Delivery Pre Treatment room air  -ML     Post SpO2 (%) 96  -ML     O2 Delivery Post Treatment room air  -ML     Pre Patient Position Supine  -ML     Intra Patient Position Standing  -ML     Post Patient Position Sitting  -ML       Row Name 07/28/25 1510          Positioning and Restraints    Pre-Treatment Position in bed  -ML     Post Treatment Position chair  -ML     In Chair notified nsg;sitting;call light within reach;encouraged to call for assist;waffle cushion  -ML               User Key  (r) = Recorded By, (t) = Taken By, (c) = Cosigned By      Initials Name Provider Type    Kelly Alvarado Physical Therapist                   Outcome Measures       Row Name 07/28/25 1513          How much help from another person do you currently need...    Turning from your back to your side while in flat bed without using bedrails? 4  -ML     Moving from lying on back to sitting on the side of a flat bed without bedrails? 4  -ML     Moving to and from a bed to a chair (including a wheelchair)? 4  -ML     Standing up from a chair using your arms  (e.g., wheelchair, bedside chair)? 4  -ML     Climbing 3-5 steps with a railing? 4  -ML     To walk in hospital room? 4  -ML     AM-PAC 6 Clicks Score (PT) 24  -ML     Highest Level of Mobility Goal Walk 250 Feet or More - 8  -ML       Row Name 07/28/25 1513 07/28/25 1458       Functional Assessment    Outcome Measure Options AM-PAC 6 Clicks Basic Mobility (PT)  -ML AM-PAC 6 Clicks Daily Activity (OT)  -              User Key  (r) = Recorded By, (t) = Taken By, (c) = Cosigned By      Initials Name Provider Type    ML Kelly Norton Physical Therapist    SA Juan Guerin OT Occupational Therapist                  Physical Therapy Education       Title: PT OT SLP Therapies (In Progress)       Topic: Physical Therapy (In Progress)       Point: Mobility training (Done)       Learning Progress Summary            Patient Acceptance, E, VU by  at 7/28/2025 1513                      Point: Home exercise program (Not Started)       Learner Progress:  Not documented in this visit.              Point: Body mechanics (Not Started)       Learner Progress:  Not documented in this visit.              Point: Precautions (Done)       Learning Progress Summary            Patient Acceptance, E, VU by  at 7/28/2025 1513                                      User Key       Initials Effective Dates Name Provider Type [facility] 04/22/21 -  Kelly Norton Physical Therapist PT                  PT Recommendation and Plan     Outcome Evaluation: Physical therapy evaluation complete. The patient independent with transfers and ambulation. Patient presents near baseline for mobility, no acute PT needs identified.     Time Calculation:   PT Evaluation Complexity  History, PT Evaluation Complexity: 1-2 personal factors and/or comorbidities  Examination of Body Systems (PT Eval Complexity): 1-2 elements  Clinical Presentation (PT Evaluation Complexity): evolving  Clinical Decision Making (PT Evaluation Complexity): low  complexity  Overall Complexity (PT Evaluation Complexity): low complexity     PT Charges       Row Name 07/28/25 1514             Time Calculation    Start Time 1356  -ML      PT Received On 07/28/25  -ML         Untimed Charges    PT Eval/Re-eval Minutes 31  -ML         Total Minutes    Untimed Charges Total Minutes 31  -ML       Total Minutes 31  -ML                User Key  (r) = Recorded By, (t) = Taken By, (c) = Cosigned By      Initials Name Provider Type    Kelly Alvarado Physical Therapist                  Therapy Charges for Today       Code Description Service Date Service Provider Modifiers Qty    93089151723 HC PT EVAL LOW COMPLEXITY 3 7/28/2025 Kelly Norton GP 1            PT G-Codes  Outcome Measure Options: AM-PAC 6 Clicks Basic Mobility (PT)  AM-PAC 6 Clicks Score (PT): 24  AM-PAC 6 Clicks Score (OT): 24    PT Discharge Summary  Anticipated Discharge Disposition (PT): home  Reason for Discharge: Independent, At baseline function    Kelly Norton  7/28/2025

## 2025-07-28 NOTE — NURSING NOTE
[facility]  HEART FAILURE CLINIC  NURSE NAVIGATOR NOTE    Wilmer Stover  :  1975  DOS:  25    Active Hospital Problems    Diagnosis     **Pericarditis     Mass of upper lobe of right lung     Type 2 diabetes mellitus without complication, without long-term current use of insulin     Personal history of nicotine dependence     Sepsis with acute organ dysfunction     Coronary artery disease involving native coronary artery of native heart     Essential hypertension        Medical records have been reviewed.  Patient is a good candidate for the [facility].  Education provided.  Education time 15 mins.  Patient to be scheduled follow up 3 days  post discharge.      Current Facility-Administered Medications:     acetaminophen (TYLENOL) tablet 650 mg, 650 mg, Oral, Q6H PRN, Shaneka Palacios APRN, 650 mg at 25 0648    amLODIPine (NORVASC) tablet 10 mg, 10 mg, Oral, Daily, Sushma Arevalo MD, 10 mg at 25 0829    aspirin EC tablet 650 mg, 650 mg, Oral, BID, Kristofer Dong MD, 650 mg at 25 0829    sennosides-docusate (PERICOLACE) 8.6-50 MG per tablet 2 tablet, 2 tablet, Oral, BID PRN **AND** polyethylene glycol (MIRALAX) packet 17 g, 17 g, Oral, Daily PRN **AND** bisacodyl (DULCOLAX) EC tablet 5 mg, 5 mg, Oral, Daily PRN **AND** bisacodyl (DULCOLAX) suppository 10 mg, 10 mg, Rectal, Daily PRN, Sushma Arevalo MD    Calcium Replacement - Follow Nurse / BPA Driven Protocol, , Not Applicable, PRN, Sushma Arevalo MD    carvedilol (COREG) tablet 3.125 mg, 3.125 mg, Oral, BID With Meals, Sushma Arevalo MD, 3.125 mg at 25 08    clopidogrel (PLAVIX) tablet 75 mg, 75 mg, Oral, Daily, Sushma Arevalo MD, 75 mg at 25 0829    colchicine tablet 0.6 mg, 0.6 mg, Oral, Q12H, Kristofer Dong MD, 0.6 mg at 25 0829    dextrose (D50W) (25 g/50 mL) IV injection 25 g, 25 g, Intravenous, Q15 Min PRN, Sushma Arevalo MD    dextrose  (GLUTOSE) oral gel 15 g, 15 g, Oral, Q15 Min PRN, Sushma Arevalo MD    famotidine (PEPCID) tablet 20 mg, 20 mg, Oral, BID, Sushma Arevalo MD, 20 mg at 07/28/25 0829    FLUoxetine (PROzac) capsule 10 mg, 10 mg, Oral, Daily, Sushma Arevalo MD, 10 mg at 07/28/25 0828    glucagon (GLUCAGEN) injection 1 mg, 1 mg, Intramuscular, Q15 Min PRN, Sushma Arevalo MD    Insulin Lispro (humaLOG) injection 2-7 Units, 2-7 Units, Subcutaneous, 4x Daily AC & at Bedtime, Sushma Arevalo MD, 2 Units at 07/27/25 2117    ipratropium-albuterol (DUO-NEB) nebulizer solution 3 mL, 3 mL, Nebulization, Q4H PRN, Sushma Arevalo MD, 3 mL at 07/27/25 0427    losartan (COZAAR) tablet 25 mg, 25 mg, Oral, Q24H, Sushma Arevalo MD, 25 mg at 07/28/25 0829    Magnesium Cardiology Dose Replacement - Follow Nurse / BPA Driven Protocol, , Not Applicable, PRN, Sushma Arevalo MD    morphine injection 1 mg, 1 mg, Intravenous, Q4H PRN, 1 mg at 07/28/25 1117 **AND** naloxone (NARCAN) injection 0.4 mg, 0.4 mg, Intravenous, Q5 Min PRN, Sushma Arevalo MD    nicotine (NICODERM CQ) 14 MG/24HR patch 1 patch, 1 patch, Transdermal, Q24H, Sushma Arevalo MD, 1 patch at 07/27/25 2119    nicotine polacrilex (NICORETTE) gum 2 mg, 2 mg, Mouth/Throat, Q1H PRN, Sushma Arevalo MD    nitroglycerin (NITROSTAT) SL tablet 0.4 mg, 0.4 mg, Sublingual, Q5 Min PRN, Sushma Arevalo MD    ondansetron (ZOFRAN) injection 4 mg, 4 mg, Intravenous, Q6H PRN, Sushma Arevalo MD    Phosphorus Replacement - Follow Nurse / BPA Driven Protocol, , Not Applicable, PRN, Sushma Arevalo MD    Potassium Replacement - Follow Nurse / BPA Driven Protocol, , Not Applicable, Irina WOODALL Jennifer, MD    rosuvastatin (CRESTOR) tablet 20 mg, 20 mg, Oral, Daily, Sushma Arevalo MD, 20 mg at 07/28/25 0829    sodium chloride 0.9 % flush 10 mL, 10 mL, Intravenous, PRN, Chris Hernandez MD, 10 mL at 07/28/25 0829    traZODone (DESYREL) tablet 100 mg, 100 mg, Oral, Nightly  Irina WOODALL Jennifer, MD, 100 mg at 07/28/25 0002       Lab Results   Component Value Date    PROBNP 223.0 07/26/2025         Echo Results:  Results for orders placed during the hospital encounter of 07/26/25    Adult Transthoracic Echo Limited W/ Cont if Necessary Per Protocol 07/27/2025  8:48 AM    Interpretation Summary    Left ventricular systolic function is normal. Left ventricular ejection fraction appears to be 56 - 60%.    Moderate aortic valve regurgitation is present.    Mild mitral regurgitation.    Trace tricuspid regurgitation with normal RVSP.    There is a moderate (1-2cm) pericardial effusion. There is no evidence of cardiac tamponade.    On comparison with echocardiogram of 7/13/2025 the pericardial effusion has increased.       Heart Failure Education    []  Risk factors  []  Medications management and adherence  []  Low sodium diet  []  Fluid restriction:   []  Exercise/activity/cardiac rehab  []  Smoking cessation  [x]  Signs/symptoms  []  Daily weight management  []  Weight management  [x]  Importance of keeping follow up office visits  [x]  Role of [facility]  []  Other   []  Hyperkalemia  []  Other:    []  EF teaching    Unable to provide heart failure education today:  []  Patient/family refused   []  Not available  []  Not able to participate   []  Other:

## 2025-07-28 NOTE — PROGRESS NOTES
"[facility] Progress Note     LOS: 2 days   Patient Care Team:  Mata Maher MD as PCP - General (Family Medicine)  PCP:  Mata Maher MD    Chief Complaint: Pericarditis    Subjective: Patient reports significant improvement from admission on Saturday in regards to SOB and chest pain.  Patient states he was able to get up and walk around today with mild SOB and pleuritic CP.  Patient still endorses positional pleuritic chest pain and is most comfortable on his right side.      OBJECTIVE  REVIEW OF SYSTEMS:  @Cardiovascular ROS: positive for - chest pain and dyspnea on exertion@        Vital Sign Min/Max for last 24 hours  Temp  Min: 97.8 °F (36.6 °C)  Max: 99.5 °F (37.5 °C)   BP  Min: 94/68  Max: 122/87   Pulse  Min: 77  Max: 107   Resp  Min: 16  Max: 18   SpO2  Min: 88 %  Max: 98 %   No data recorded   No data recorded     Telemetry: NSR 91 BPM       I&O/ Weights:   No intake or output data in the 24 hours ending 07/28/25 1042      07/26/25  1426 07/27/25  0835   Weight: 81.6 kg (180 lb) 81.6 kg (179 lb 14.3 oz)     Flowsheet Rows      Flowsheet Row First Filed Value   Admission Height 167.6 cm (66\") Documented at 07/26/2025 1426   Admission Weight 81.6 kg (180 lb) Documented at 07/26/2025 1426               Physical Exam:  Vitals reviewed.   Constitutional:       Appearance: Normal appearance. Ill-appearing and acutely ill-appearing.   Neck:      Vascular: No JVR. JVD normal.   Pulmonary:      Effort: Pulmonary effort is normal.      Breath sounds: Normal breath sounds.   Cardiovascular:      Normal rate. Regular rhythm.      Murmurs: There is no murmur.      Triphasic rub.   Pulses:     Intact distal pulses.   Edema:     Peripheral edema absent.   Abdominal:      Palpations: Abdomen is soft.   Musculoskeletal: Normal range of motion. Skin:     General: Skin is warm and dry.   Neurological:      General: No focal deficit present.      Mental Status: Alert. "   Psychiatric:         Behavior: Behavior is cooperative.            Labs:   Results from last 7 days   Lab Units 25  04225  170   WBC 10*3/mm3 11.74* 11.44* 16.52*   HEMOGLOBIN g/dL 12.7* 12.4* 15.1   HEMATOCRIT % 38.2 37.8 45.0   PLATELETS 10*3/mm3 478* 431 599*     Lab Results   Lab Value Date/Time    TROPONINT 10 2025    TROPONINT 43 (H) 2025 2239    TROPONINT 10 2025 1809    TROPONINT 9 2025 1705    TROPONINT 74 (C) 2025    TROPONINT 93 (C) 2025    TROPONINT 101 (C) 2025    TROPONINT <6 2023 1127    TROPONINT <6 2023 0915    TROPONINT <0.010 2022 1406    TROPONINT <0.010 2022 1202     Results from last 7 days   Lab Units 25   INR  1.25*   APTT seconds 36.3*     Results from last 7 days   Lab Units 25   SODIUM mmol/L 139 137 136   POTASSIUM mmol/L 4.4 3.9 3.9   CHLORIDE mmol/L 104 106 99   CO2 mmol/L 23.0 18.9* 23.5   BUN mg/dL 6.3 8.9 8.7   CREATININE mg/dL 0.88 0.75* 0.98   CALCIUM mg/dL 9.2 8.4* 9.8   BILIRUBIN mg/dL  --  0.5 0.9   ALK PHOS U/L  --  85 114   ALT (SGPT) U/L  --  15 19   AST (SGOT) U/L  --  20 24   GLUCOSE mg/dL 89 73 126*     Results from last 7 days   Lab Units 25   HEMOGLOBIN A1C % 5.41     Results from last 7 days   Lab Units 25   CHOLESTEROL mg/dL 95   TRIGLYCERIDES mg/dL 61   HDL CHOL mg/dL 25*   LDL CHOL mg/dL 56             Medication Review:   amLODIPine, 10 mg, Oral, Daily  aspirin, 650 mg, Oral, BID  carvedilol, 3.125 mg, Oral, BID With Meals  clopidogrel, 75 mg, Oral, Daily  colchicine, 0.6 mg, Oral, Q12H  famotidine, 20 mg, Oral, BID  FLUoxetine, 10 mg, Oral, Daily  insulin lispro, 2-7 Units, Subcutaneous, 4x Daily AC & at Bedtime  losartan, 25 mg, Oral, Q24H  nicotine, 1 patch, Transdermal, Q24H  rosuvastatin, 20 mg, Oral, Daily             IMAGING/DIAGNOSTICS     EK/26  NSR with  nonspecific T wave abnormalities    Results for orders placed during the hospital encounter of 07/26/25    Adult Transthoracic Echo Limited W/ Cont if Necessary Per Protocol 07/27/2025 0848    Interpretation Summary    Left ventricular systolic function is normal. Left ventricular ejection fraction appears to be 56 - 60%.    Moderate aortic valve regurgitation is present.    Mild mitral regurgitation.    Trace tricuspid regurgitation with normal RVSP.    There is a moderate (1-2cm) pericardial effusion. There is no evidence of cardiac tamponade.    On comparison with echocardiogram of 7/13/2025 the pericardial effusion has increased.      Problem List:   Patient Active Problem List    Diagnosis Date Noted    *Pericarditis 07/26/2025    Mass of upper lobe of right lung 07/26/2025    Type 2 diabetes mellitus without complication, without long-term current use of insulin 07/26/2025    Personal history of nicotine dependence 07/26/2025    Sepsis with acute organ dysfunction 07/26/2025    Acute pericarditis 07/13/2025     Note Last Updated: 7/13/2025     [facility] admission for chest pain, NSTEMI, and EKG evidence of pericarditis, 7/11/2025  Echo (7/13/2025): Normal LVEF.  Mild to moderate pericardial effusion with no tamponade.  Moderate aortic regurgitation present.      Nonrheumatic aortic valve insufficiency 07/13/2025     Note Last Updated: 7/13/2025     Echo (7/13/2025): Normal LVEF.  Mild to moderate pericardial effusion with no tamponade.  Moderate aortic regurgitation present.      Essential hypertension 07/12/2025     Note Last Updated: 7/12/2025     Target blood pressure <130/80 mmHg      Coronary artery disease involving native coronary artery of native heart 07/12/2025     Note Last Updated: 7/12/2025     Cardiac catheterization for NSTEMI (7/12/2025): Severe 2-vessel CAD (proximal RCA, OM1).  Proximal RCA culprit for NSTEMI status post 4 x 23 mm LETICAI.  Recommend medical therapy for OM1 disease and as well  as moderate diffuse multivessel disease.  Mildly elevated LV filling pressure (LVEDP 17 mmHg)      Hyperlipidemia LDL goal <50 07/12/2025    Hepatitis C 07/12/2025    NSTEMI (non-ST elevated myocardial infarction) 07/11/2025     Note Last Updated: 7/13/2025     Cardiac catheterization for NSTEMI (7/12/2025): Severe 2-vessel CAD (proximal RCA, OM1).  Proximal RCA culprit for NSTEMI status post 4 x 23 mm LETICIA.  Recommend medical therapy for OM1 disease and as well as moderate diffuse multivessel disease.  Mildly elevated LV filling pressure (LVEDP 17 mmHg)  Echo (7/13/2025): Normal LVEF.  Mild to moderate pericardial effusion with no tamponade.  Moderate aortic regurgitation present.         Assessment:  Recurrent pericarditis  Acute pericarditis 7/13 secondary to non-STEMI s/p PCI   CRP/ESR elevated, low-grade fever with no signs of infection, elevated          D-dimer with CTA negative for PE and duplex negative for DVT  Limited echo 7/26 moderate pericardial effusion without tamponade, at 1-2.7 cm   Increase colchicine 0.6 mg p.o. to twice daily dosing based on weight  Started  mg p.o. twice daily, this can be tapered down as outpatient  If refractory IL-1 inhibitor should be considered  CAD  EF 56-60%  [facility] 7/13 x 1 LETICIA RCA  Continued on DAPT, statin, beta-blocker  Essential hypertension  Low normal/normotensive  Losartan 25 mg daily, amlodipine 10 mg daily  Current /93  RUL pulmonary mass  Incidental finding on CT scan 7/26 showing right apical lobulated density measuring 2.6 cm x 2.6 cm.  Pulmonology consulted  Pulmonology considering biopsy, however would not recommend holding Plavix until greater than 90 days post intervention.      Plan:   Continue current plan of care as noted above, defer pericardiocentesis for now. If hemodynamic instability or significant change in symptoms STAT repeat limited ECHO with consideration of pericardiocentesis at that time.   Given patient's progression over the  last 2 days, if feeling well in the morning patient may be discharged home from cardiology perspective with follow-up already scheduled with LELA Lpoez on 8/12      K. LELA Juárez

## 2025-07-28 NOTE — CASE MANAGEMENT/SOCIAL WORK
Continued Stay Note   [facility]     Patient Name: Wilmer Stover  MRN: [id number]  Today's Date: 7/28/2025    Admit Date: 7/26/2025    Plan: Home   Discharge Plan       Row Name 07/28/25 1156       Plan    Plan Home    Patient/Family in Agreement with Plan yes    Plan Comments I spoke with Mr Stover at bedside.  At this time he denies any discharge needs.  I did ask about unreliable transportation, and he states that he does not have any issues with unreliable transportation.  I asked if he felt like he needed assistance with transportation, and he denies any needs.  Case management will continue to follow.    Final Discharge Disposition Code 01 - home or self-care                   Discharge Codes    No documentation.                 Expected Discharge Date and Time       Expected Discharge Date Expected Discharge Time    Jul 29, 2025 10:00 AM              Viji Razo RN

## 2025-07-28 NOTE — OUTREACH NOTE
[facility] Week 2 Survey      Flowsheet Row Responses   [facility] facility patient discharged from? [facility]   Does the patient have one of the following disease processes/diagnoses(primary or secondary)? Acute MI (STEMI,NSTEMI)   Revoke Readmitted            LILIYA GANN - Registered Nurse

## 2025-07-28 NOTE — THERAPY DISCHARGE NOTE
Acute Care - Occupational Therapy Discharge  [facility]    Patient Name: Wilmer Stover  : 1975    MRN: [id number]                              Today's Date: 2025       Admit Date: 2025    Visit Dx:     ICD-10-CM ICD-9-CM   1. Acute sepsis  A41.9 038.9     995.91   2. Pneumonia due to infectious organism, unspecified laterality, unspecified part of lung  J18.9 486   3. Acute pericarditis, unspecified type  I30.9 420.90   4. Pericardial effusion  I31.39 423.9   5. Smoker  F17.200 305.1     Patient Active Problem List   Diagnosis    NSTEMI (non-ST elevated myocardial infarction)    Essential hypertension    Coronary artery disease involving native coronary artery of native heart    Hyperlipidemia LDL goal <50    Hepatitis C    Acute pericarditis    Nonrheumatic aortic valve insufficiency    Pericarditis    Mass of upper lobe of right lung    Type 2 diabetes mellitus without complication, without long-term current use of insulin    Personal history of nicotine dependence    Sepsis with acute organ dysfunction     Past Medical History:   Diagnosis Date    Allergic     Anxiety     Arthritis     Asthma     COPD (chronic obstructive pulmonary disease)     Depression     Gall stones     Hepatitis C     Hodgkin's lymphoma     Hyperlipidemia     Hypertension     Kidney stone     Memory loss     Neuropathy     Numbness and tingling     Thyroid disease     Weakness      Past Surgical History:   Procedure Laterality Date    CARDIAC CATHETERIZATION N/A 2025    Procedure: Left Heart Cath;  Surgeon: Slick Lama IV, MD;  Location:  [facility];  Service: Cardiovascular;  Laterality: N/A;    CARDIAC CATHETERIZATION N/A 2025    Procedure: Optical Coherence Tomography;  Surgeon: Slick Lama IV, MD;  Location:  [facility];  Service: Cardiovascular;  Laterality: N/A;    CARDIAC CATHETERIZATION N/A 2025    Procedure: Stent LETICIA  coronary;  Surgeon: Slick Lama IV, MD;  Location: [facility] CATH INVASIVE [facility];  Service: Cardiovascular;  Laterality: N/A;    CHOLECYSTECTOMY      CYSTOSCOPY BLADDER STONE LITHOTRIPSY      MASTECTOMY MODIFIED RADICAL W/ AXILLARY LYMPH NODES W/ OR W/O PECTORALIS MINOR        General Information       Row Name 07/28/25 1444          OT Time and Intention    Document Type discharge evaluation/summary  -     Mode of Treatment occupational therapy  -       Row Name 07/28/25 1444          General Information    Patient Profile Reviewed yes  -SA     Prior Level of Function independent:;all household mobility;community mobility;gait;transfer;bed mobility;ADL's;feeding;grooming;dressing;bathing;driving;shopping;work  -     Existing Precautions/Restrictions fall  -SA     Barriers to Rehab none identified  -       Row Name 07/28/25 1444          Living Environment    Current Living Arrangements home  -     People in Home other (see comments)  Roommates  -       Row Name 07/28/25 1444          Home Main Entrance    Number of Stairs, Main Entrance one  -SA     Stair Railings, Main Entrance none  -SA       Row Name 07/28/25 1444          Stairs Within Home, Primary    Number of Stairs, Within Home, Primary other (see comments)  20  -SA     Stair Railings, Within Home, Primary railing on left side (ascending)  -       Row Name 07/28/25 1444          Cognition    Orientation Status (Cognition) oriented x 4  -SA       Row Name 07/28/25 144          Safety Issues/Impairments Affecting Functional Mobility    Safety Issues Affecting Function (Mobility) safety precaution awareness  -     Impairments Affecting Function (Mobility) shortness of breath  -               User Key  (r) = Recorded By, (t) = Taken By, (c) = Cosigned By      Initials Name Provider Type    SA Juan Guerin OT Occupational Therapist                   Mobility/ADL's       Row Name 07/28/25 1446          Bed Mobility    Bed  Mobility supine-sit  -SA     Supine-Sit Tyler (Bed Mobility) independent  -     Comment, (Bed Mobility) Denied dizziness  -SA       Row Name 07/28/25 1446          Transfers    Transfers sit-stand transfer  -SA       Row Name 07/28/25 1446          Sit-Stand Transfer    Sit-Stand Tyler (Transfers) independent  -     Comment, (Sit-Stand Transfer) 1x from EOB  -SA       Row Name 07/28/25 1446          Functional Mobility    Functional Mobility- Ind. Level independent  -     Functional Mobility-Distance (Feet) --  >HH distance  -     Patient was able to Ambulate yes  -SA       Row Name 07/28/25 1446          Activities of Daily Living    BADL Assessment/Intervention lower body dressing  -SA       Row Name 07/28/25 1446          Lower Body Dressing Assessment/Training    Tyler Level (Lower Body Dressing) don;shoes/slippers;independent  -     Position (Lower Body Dressing) edge of bed sitting  -               User Key  (r) = Recorded By, (t) = Taken By, (c) = Cosigned By      Initials Name Provider Type    Juan Burgos OT Occupational Therapist                   Obj/Interventions       Row Name 07/28/25 1447          Sensory Assessment (Somatosensory)    Sensory Assessment Reports intermittent numbness/tingling UEs L>R, denies numbness and tingling at time of eval  -SA       Row Name 07/28/25 1447          Vision Assessment/Intervention    Visual Impairment/Limitations WFL  -SA       Row Name 07/28/25 1447          Range of Motion Comprehensive    General Range of Motion no range of motion deficits identified  -SA       Row Name 07/28/25 1447          Strength Comprehensive (MMT)    General Manual Muscle Testing (MMT) Assessment no strength deficits identified  -     Comment, General Manual Muscle Testing (MMT) Assessment Grossly WFL  -SA       Row Name 07/28/25 1447          Balance    Balance Assessment sitting static balance;sitting dynamic balance;sit to stand dynamic  balance;standing static balance;standing dynamic balance  -SA     Static Sitting Balance independent  -SA     Dynamic Sitting Balance independent  -SA     Position, Sitting Balance unsupported;sitting edge of bed  -SA     Static Standing Balance independent  -SA     Dynamic Standing Balance independent  -SA     Position/Device Used, Standing Balance unsupported  -SA     Comment, Balance No LOB  -SA               User Key  (r) = Recorded By, (t) = Taken By, (c) = Cosigned By      Initials Name Provider Type    Juan Burgos, PETRA Occupational Therapist                   Goals/Plan    No documentation.                  Clinical Impression       Row Name 07/28/25 1448          Pain Assessment    Pretreatment Pain Rating 2/10  -SA     Posttreatment Pain Rating 2/10  -SA     Pain Location chest  -     Pain Side/Orientation left;generalized  -     Pain Management Interventions exercise or physical activity utilized;activity modification encouraged  -     Response to Pain Interventions activity participation with tolerable pain  -SA       Row Name 07/28/25 1448          Plan of Care Review    Plan of Care Reviewed With patient  -SA     Progress no change  -     Outcome Evaluation OT evaluation complete. Pt presents at/near functional baseline. Pt independent with all bed mobility, LBD, and functional mobility throughout evaluation. Pt walked > household distance independently without LOB or need for rest break. Pt did report shortness of air upon return to room after ambulation, SPO2 96%. Pt presents with no acute OT concerns at this time, OT will sign off, please reconsult if necessary. OT recommends home at discharge.  -       Row Name 07/28/25 1448          Therapy Assessment/Plan (OT)    Criteria for Skilled Therapeutic Interventions Met (OT) no;no problems identified which require skilled intervention  -       Row Name 07/28/25 1448          Therapy Plan Review/Discharge Plan (OT)    Anticipated  Discharge Disposition (OT) home  -       Row Name 07/28/25 1448          Vital Signs    Pre Systolic BP Rehab 106  -SA     Pre Treatment Diastolic BP 72  -SA     Post Systolic BP Rehab 101  -SA     Post Treatment Diastolic BP 71  -SA     Pretreatment Heart Rate (beats/min) 70  -SA     Posttreatment Heart Rate (beats/min) 106  -SA     Pre SpO2 (%) 96  -SA     O2 Delivery Pre Treatment room air  -SA     Intra SpO2 (%) 96  -SA     O2 Delivery Intra Treatment room air  -SA     Post SpO2 (%) 96  -SA     O2 Delivery Post Treatment room air  -SA     Pre Patient Position Supine  -SA     Intra Patient Position Sitting  -SA     Post Patient Position Sitting  -SA       Row Name 07/28/25 1448          Positioning and Restraints    Pre-Treatment Position in bed  -SA     Post Treatment Position chair  -SA     In Chair notified nsg;sitting;call light within reach;encouraged to call for assist;waffle cushion  Alarm deferred by RN  -SA               User Key  (r) = Recorded By, (t) = Taken By, (c) = Cosigned By      Initials Name Provider Type    Juan Burgos OT Occupational Therapist                   Outcome Measures       Row Name 07/28/25 1458          How much help from another is currently needed...    Putting on and taking off regular lower body clothing? 4  -SA     Bathing (including washing, rinsing, and drying) 4  -SA     Toileting (which includes using toilet bed pan or urinal) 4  -SA     Putting on and taking off regular upper body clothing 4  -SA     Taking care of personal grooming (such as brushing teeth) 4  -SA     Eating meals 4  -SA     AM-PAC 6 Clicks Score (OT) 24  -SA       Row Name 07/28/25 1458          Functional Assessment    Outcome Measure Options AM-PAC 6 Clicks Daily Activity (OT)  -               User Key  (r) = Recorded By, (t) = Taken By, (c) = Cosigned By      Initials Name Provider Type    Juan Burgos OT Occupational Therapist                  Occupational Therapy Education        Title: PT OT SLP Therapies (In Progress)       Topic: Occupational Therapy (In Progress)       Point: ADL training (Done)       Learning Progress Summary            Patient Acceptance, E, VU,DU by  at 7/28/2025 1458                      Point: Precautions (Done)       Learning Progress Summary            Patient Acceptance, E, VU,DU by  at 7/28/2025 1458                      Point: Body mechanics (Done)       Learning Progress Summary            Patient Acceptance, E, VU,DU by  at 7/28/2025 1458                                      User Key       Initials Effective Dates Name Provider Type Discipline     04/16/25 -  Juan Guerin OT Occupational Therapist OT                  OT Recommendation and Plan     Plan of Care Review  Plan of Care Reviewed With: patient  Progress: no change  Outcome Evaluation: OT evaluation complete. Pt presents at/near functional baseline. Pt independent with all bed mobility, LBD, and functional mobility throughout evaluation. Pt walked > household distance independently without LOB or need for rest break. Pt did report shortness of air upon return to room after ambulation, SPO2 96%. Pt presents with no acute OT concerns at this time, OT will sign off, please reconsult if necessary. OT recommends home at discharge.  Plan of Care Reviewed With: patient  Outcome Evaluation: OT evaluation complete. Pt presents at/near functional baseline. Pt independent with all bed mobility, LBD, and functional mobility throughout evaluation. Pt walked > household distance independently without LOB or need for rest break. Pt did report shortness of air upon return to room after ambulation, SPO2 96%. Pt presents with no acute OT concerns at this time, OT will sign off, please reconsult if necessary. OT recommends home at discharge.     Time Calculation:   Evaluation Complexity (OT)  Review Occupational Profile/Medical/Therapy History Complexity: brief/low complexity  Assessment,  Occupational Performance/Identification of Deficit Complexity: 1-3 performance deficits  Clinical Decision Making Complexity (OT): problem focused assessment/low complexity  Overall Complexity of Evaluation (OT): low complexity     Time Calculation- OT       Row Name 07/28/25 1502             Time Calculation- OT    OT Start Time 1354  -SA      OT Received On 07/28/25  -SA         Untimed Charges    OT Eval/Re-eval Minutes 37  -SA         Total Minutes    Untimed Charges Total Minutes 37  -SA       Total Minutes 37  -SA                User Key  (r) = Recorded By, (t) = Taken By, (c) = Cosigned By      Initials Name Provider Type    SA Juan Guerin OT Occupational Therapist                  Therapy Charges for Today       Code Description Service Date Service Provider Modifiers Qty    35405532280 HC OT EVAL LOW COMPLEXITY 3 7/28/2025 Juan Guerin OT GO 1               OT Discharge Summary  Anticipated Discharge Disposition (OT): home    Juan Guerin OT  7/28/2025

## 2025-07-28 NOTE — PLAN OF CARE
Goal Outcome Evaluation:  Plan of Care Reviewed With: patient           Outcome Evaluation: Physical therapy evaluation complete. The patient independent with transfers and ambulation. Patient presents near baseline for mobility, no acute PT needs identified.    Anticipated Discharge Disposition (PT): home

## 2025-07-28 NOTE — PROGRESS NOTES
[facility] Medicine Services  PROGRESS NOTE    Patient Name: Wilmer Stover  : 1975  MRN: [id number]    Date of Admission: 2025  Primary Care Physician: Mata Maher MD    Subjective   Subjective     CC:  F/u chest pain    HPI:  Patient resting in bed. Still having some shortness of breath and chest pain, especially with deep breathing.      Objective   Objective     Vital Signs:   Temp:  [97.8 °F (36.6 °C)-99.5 °F (37.5 °C)] 99.5 °F (37.5 °C)  Heart Rate:  [] 101  Resp:  [16-18] 18  BP: ()/(68-93) 110/93     Physical Exam:  Constitutional: No acute distress, awake, alert  HENT: NCAT, mucous membranes moist  Respiratory: Clear to auscultation bilaterally, respiratory effort normal   Cardiovascular: RRR, no murmurs, rubs, or gallops  Gastrointestinal: soft, nontender, nondistended  Musculoskeletal: No bilateral ankle edema  Psychiatric: Appropriate affect, cooperative  Neurologic: Oriented x 3, speech clear, no focal deficits  Skin: No rashes      Results Reviewed:  LAB RESULTS:      Lab 25  0421 25  0418 25  2239 25  1809 25  1705   WBC 11.74* 11.44*  --   --  16.52*   HEMOGLOBIN 12.7* 12.4*  --   --  15.1   HEMATOCRIT 38.2 37.8  --   --  45.0   PLATELETS 478* 431  --   --  599*   NEUTROS ABS 7.83* 7.95*  --   --  13.11*   IMMATURE GRANS (ABS) 0.05 0.06*  --   --  0.06*   LYMPHS ABS 1.97 1.54  --   --  1.59   MONOS ABS 1.49* 1.53*  --   --  1.57*   EOS ABS 0.33 0.30  --   --  0.12   MCV 87.8 87.9  --   --  86.5   SED RATE  --  51*  --   --   --    CRP  --  18.30*  --   --   --    PROCALCITONIN  --  0.09  --   --  0.06   LACTATE  --  0.9 0.7  --   --    PROTIME  --  16.4*  --   --   --    APTT  --  36.3*  --   --   --    D DIMER QUANT  --   --   --   --  5.75*   HSTROP T  --  10 43* 10 9         Lab 25  0421 25  0418 25  1809 25  1705   SODIUM 139 137  --  136   POTASSIUM 4.4 3.9  --  3.9   CHLORIDE 104  106  --  99   CO2 23.0 18.9*  --  23.5   ANION GAP 12.0 12.1  --  13.5   BUN 6.3 8.9  --  8.7   CREATININE 0.88 0.75*  --  0.98   EGFR 104.8 109.9  --  93.9   GLUCOSE 89 73  --  126*   CALCIUM 9.2 8.4*  --  9.8   MAGNESIUM  --  2.2 2.2  --    PHOSPHORUS  --  2.5 3.2  --    HEMOGLOBIN A1C  --  5.41  --   --          Lab 07/27/25  0418 07/26/25  1705   TOTAL PROTEIN 6.6 8.6*   ALBUMIN 3.3* 4.3   GLOBULIN 3.3 4.3   ALT (SGPT) 15 19   AST (SGOT) 20 24   BILIRUBIN 0.5 0.9   ALK PHOS 85 114   LIPASE  --  18         Lab 07/27/25  0418 07/26/25  2239 07/26/25  1809 07/26/25  1705   PROBNP  --   --  223.0 190.0   HSTROP T 10 43* 10 9   PROTIME 16.4*  --   --   --    INR 1.25*  --   --   --          Lab 07/27/25  0418   CHOLESTEROL 95   LDL CHOL 56   HDL CHOL 25*   TRIGLYCERIDES 61             Lab 07/26/25  2358   PH, ARTERIAL 7.438   PCO2, ARTERIAL 35.2   PO2 ART 74.1*   FIO2 21   HCO3 ART 23.8   BASE EXCESS ART 0.0   CARBOXYHEMOGLOBIN 1.6     Brief Urine Lab Results  (Last result in the past 365 days)        Color   Clarity   Blood   Leuk Est   Nitrite   Protein   CREAT   Urine HCG        07/27/25 0101 [place]   Cloudy   Trace   Trace   Negative   30 mg/dL (1+)                   Microbiology Results Abnormal       None            Duplex Venous Lower Extremity - Bilateral CAR  Result Date: 7/27/2025    Normal bilateral lower extremity venous duplex scan.     XR Abdomen KUB  Result Date: 7/27/2025  XR ABDOMEN KUB Date of Exam: 7/26/2025 8:56 PM EDT Indication: r/o free air Comparison: Same day CT chest Findings: There is no evidence of bowel obstruction. Nonspecific bowel gas pattern. No evidence of free intraperitoneal gas on this single upright image. Included lung bases show trace left pleural effusion and left lung atelectasis. There is contrast within the collecting system from same day CT chest. No acute bone abnormality..     Impression: Impression: No evidence of bowel obstruction or free intraperitoneal gas.  Electronically Signed: Vlad Baxter MD  7/27/2025 8:29 AM EDT  Workstation ID: [id number]    CT Angiogram Chest Pulmonary Embolism  Result Date: 7/26/2025  CT ANGIOGRAM CHEST PULMONARY EMBOLISM Date of Exam: 7/26/2025 6:35 PM EDT Indication: Pulmonary Embolism. Comparison: None available. Technique: Axial CT images were obtained of the chest after the uneventful intravenous administration of 75 cc Isovue-370 IV contrast utilizing pulmonary embolism protocol.  In addition, a 3-D volume rendered image was created for interpretation.  Reconstructed coronal and sagittal images were also obtained. Automated exposure control and iterative construction methods were used. Findings: There is a right apical opacity abutting the pleura with lobulated margins measuring 2.6 cm x 2.6 cm concerning for a mass. Normal atelectatic changes. Left upper lobe bandlike atelectasis. Large pericardial effusion with trace bilateral pleural fluid. No pneumothorax. No evidence of aortic dissection. No pulmonary embolus. Atheromatous disease of the coronary vessels. No mediastinal, perihilar, or axillary adenopathy. Thoracic vertebral body height and alignment are normal. The sternum is intact. No rib fractures are seen.     Impression: 1. Large pericardial effusion. No pulmonary embolus. 2. Lobulated right apical opacity during 2.6 cm x 2.6 cm concerning for a possible mass. PET/CT and/or biopsy recommended. Electronically Signed: Kane Villeda MD  7/26/2025 7:30 PM EDT  Workstation ID: [id number]    XR Chest 1 View  Result Date: 7/26/2025  XR CHEST 1 VW Date of Exam: 7/26/2025 3:30 PM EDT Indication: Chest Pain Triage Protocol Comparison: Chest radiograph dated 7/11/2025 Findings: There is bandlike atelectasis within the left midlung. There is streaky left basilar airspace opacity which is new from prior examination. This could represent atelectasis or pneumonia. The right lung is clear. There is no pleural effusion or pneumothorax. There are  degenerative changes of the thoracic spine.     Impression: Impression: 1.Streaky left basilar airspace opacity which could represent atelectasis or pneumonia. 2.Bandlike atelectasis within the left midlung. Electronically Signed: Padilla Reyes MD  7/26/2025 3:59 PM EDT  Workstation ID: [id number]      Results for orders placed during the hospital encounter of 07/26/25    Adult Transthoracic Echo Limited W/ Cont if Necessary Per Protocol 07/27/2025  8:48 AM    Interpretation Summary    Left ventricular systolic function is normal. Left ventricular ejection fraction appears to be 56 - 60%.    Moderate aortic valve regurgitation is present.    Mild mitral regurgitation.    Trace tricuspid regurgitation with normal RVSP.    There is a moderate (1-2cm) pericardial effusion. There is no evidence of cardiac tamponade.    On comparison with echocardiogram of 7/13/2025 the pericardial effusion has increased.      Current medications:  Scheduled Meds:amLODIPine, 10 mg, Oral, Daily  aspirin, 650 mg, Oral, BID  carvedilol, 3.125 mg, Oral, BID With Meals  clopidogrel, 75 mg, Oral, Daily  colchicine, 0.6 mg, Oral, Q12H  famotidine, 20 mg, Oral, BID  FLUoxetine, 10 mg, Oral, Daily  insulin lispro, 2-7 Units, Subcutaneous, 4x Daily AC & at Bedtime  losartan, 25 mg, Oral, Q24H  nicotine, 1 patch, Transdermal, Q24H  rosuvastatin, 20 mg, Oral, Daily      Continuous Infusions:     PRN Meds:.  acetaminophen    senna-docusate sodium **AND** polyethylene glycol **AND** bisacodyl **AND** bisacodyl    Calcium Replacement - Follow Nurse / BPA Driven Protocol    dextrose    dextrose    glucagon (human recombinant)    ipratropium-albuterol    Magnesium Cardiology Dose Replacement - Follow Nurse / BPA Driven Protocol    Morphine **AND** naloxone    nicotine polacrilex    nitroglycerin    ondansetron    Phosphorus Replacement - Follow Nurse / BPA Driven Protocol    Potassium Replacement - Follow Nurse / BPA Driven Protocol    sodium chloride     traZODone    Assessment & Plan   Assessment & Plan     Active Hospital Problems    Diagnosis  POA    **Pericarditis [I31.9]  Yes    Mass of upper lobe of right lung [R91.8]  Yes    Type 2 diabetes mellitus without complication, without long-term current use of insulin [E11.9]  Yes    Personal history of nicotine dependence [Z87.891]  Not Applicable    Sepsis with acute organ dysfunction [A41.9, R65.20]  Yes    Coronary artery disease involving native coronary artery of native heart [I25.10]  Yes    Essential hypertension [I10]  Yes      Resolved Hospital Problems   No resolved problems to display.        Brief Hospital Course to date:  Wilmer Stover is a 50 y.o. male history of Hodgkin's lymphoma (in remission for 13-14 years), CAD s/p recent cardiac stent placement, and diabetes, presenting with recurrent headache, dizziness, chest pain, and breathing difficulties that started yesterday and continued today. He reports chest pain mostly on inspiration.     Enlarging Pericardial Effusion  Recurrent Pericarditis  - repeated limited echo showing enlarging pericardial effusion, no evidence for tamponade  - Cardiology following, started on high dose ASA, continue colchicine  - currently NPO, cardiology considering periocardiocentesis  - CRP/ESR elevated    RUL Pulmonary Mass  Hx Hodgkin's Lymphoma  - CT scan showing right apical opacity abutting the pleura with lobulated margins measuring 2.6 cm x 2.6 cm. Pulm consultation appreciated  - recommend outpatient PET and repeat CT imaging, if indicative of possible malignancy would proceed with biopsy if cardiology felt that it was okay to hold DAPT in setting of recent stent placement. Ideally would remain on DAPT for at least 90 days    Low Grade Fever   - no obvious infectious source, fever appears to be isolated event  - CT chest w/out pneumonia, UA negative, resp PCR negative, MRSA PCR negative  - GI PCR pending, blood cultures negative so far  - currently  watching off antibiotics, low grade fever may be related to above process vs. Underlying undiagnosed malignany.    CAD s/p recent PCI  - continue DAPT  - continue statin, Beta-blocker    Elevated D-dimer:  - CTA negative for PE, Duplex negative for DVT  - d/c heparin    Tobacco Abuse:  - nicotine replacement  - cessation counseling    DMII:  - SSI coverage  - monitor FSBS    HTN:  - continue current regimen    HLD:  - continue statin    Expected Discharge Location and Transportation: Home  Expected Discharge   Expected Discharge Date: 7/29/2025; Expected Discharge Time: 10:00 AM     VTE Prophylaxis:  Mechanical VTE prophylaxis orders are present.         AM-PAC 6 Clicks Score (PT): 24 (07/27/25 2000)    CODE STATUS:   Code Status and Medical Interventions: CPR (Attempt to Resuscitate); Full Support   Ordered at: 07/26/25 0811     Code Status (Patient has no pulse and is not breathing):    CPR (Attempt to Resuscitate)     Medical Interventions (Patient has pulse or is breathing):    Full Support     Level Of Support Discussed With:    Patient       Ana Mcfarlane DO  07/28/25

## 2025-07-28 NOTE — PLAN OF CARE
Problem: Adult Inpatient Plan of Care  Goal: Plan of Care Review  Outcome: Progressing  Flowsheets (Taken 7/28/2025 3872)  Progress: no change  Outcome Evaluation: Pt alert and oriented x4.VSS on room air. PRN trazodone given for sleep. Morphine given x1. NPO since MN. No needs voiced at this time, call light and personal items within reach.  Plan of Care Reviewed With: patient   Goal Outcome Evaluation:  Plan of Care Reviewed With: patient        Progress: no change  Outcome Evaluation: Pt alert and oriented x4.VSS on room air. PRN trazodone given for sleep. Morphine given x1. NPO since MN. No needs voiced at this time, call light and personal items within reach.

## 2025-07-28 NOTE — PLAN OF CARE
Goal Outcome Evaluation:  Plan of Care Reviewed With: patient        Progress: no change  Outcome Evaluation: OT evaluation complete. Pt presents at/near functional baseline. Pt independent with all bed mobility, LBD, and functional mobility throughout evaluation. Pt walked > household distance independently without LOB or need for rest break. Pt did report shortness of air upon return to room after ambulation, SPO2 96%. Pt presents with no acute OT concerns at this time, OT will sign off, please reconsult if necessary. OT recommends home at discharge.    Anticipated Discharge Disposition (OT): home

## 2025-07-28 NOTE — PROGRESS NOTES
"          Clinical Nutrition Assessment     Patient Name: Wilmer Stover  YOB: 1975  MRN: [id number]  Date of Encounter: 07/28/25 16:47 EDT  Admission date: 7/26/2025  Reason for Visit: MST score 2+    Assessment   Nutrition Assessment   Admission Diagnosis:  Pericarditis [I31.9]    Problem List:    Pericarditis    Essential hypertension    Coronary artery disease involving native coronary artery of native heart    Mass of upper lobe of right lung    Type 2 diabetes mellitus without complication, without long-term current use of insulin    Personal history of nicotine dependence    Sepsis with acute organ dysfunction      PMH:   He  has a past medical history of Allergic, Anxiety, Arthritis, Asthma, COPD (chronic obstructive pulmonary disease), Depression, Gall stones, Hepatitis C, Hodgkin's lymphoma, Hyperlipidemia, Hypertension, Kidney stone, Memory loss, Neuropathy, Numbness and tingling, Thyroid disease, and Weakness.    PSH:  He  has a past surgical history that includes Cholecystectomy; Mastectomy modified radical w/ axillary lymph nodes w/ or w/o pectoralis minor; cystoscopy bladder stone lithotripsy; Cardiac catheterization (N/A, 7/12/2025); Cardiac catheterization (N/A, 7/12/2025); and Cardiac catheterization (N/A, 7/12/2025).    Applicable Nutrition History:       Anthropometrics     Height: Height: 167.6 cm (65.98\")  Last Filed Weight: Weight: 81.6 kg (179 lb 14.3 oz) (07/27/25 0835)  Method: Weight Method: Stated  BMI: BMI (Calculated): 29    UBW:  Per EMR wt of 190 lbs on 6/13/25 and on 7/12 /25 180 lbs on 7/21/25  Weight change: weight loss of 10 lbs (  5%) over 1 month(s)    Significant?  Yes    Nutrition Focused Physical Exam    Date: 7/28    Unable to perform due to Potential for patient discomfort     Subjective   Reported/Observed/Food/Nutrition Related History:     7/28  Pt allows was 190 lbs a month ago and has been losing wt. Allows ate ok at home    Current Nutrition " Prescription   PO: Diet: Cardiac, Diabetic; Low Sodium (2g); Consistent Carbohydrate; Fluid Consistency: Thin (IDDSI 0)  Oral Nutrition Supplement:   Intake: 2 Days: 50% x 2 meals recorded    Assessment & Plan   Nutrition Diagnosis   Date:  7/28            Updated:    Problem Unintended weight loss    Etiology Nutrition demands of conditions including sepsis   Signs/Symptoms Loss 10 lbs 5% body wt within one month   Status: New      Goal / Objectives:   Nutrition to support treatment and Increase intake      Nutrition Intervention      Follow treatment progress, Care plan reviewed, Advise alternate selection, Menu provided, Encourage intake    Pt was not on diet at time of visit.  Gave anticipated menu  See for nutrition focused exam and possible ONS as able.    Monitoring/Evaluation:   Per protocol, I&O, PO intake, Pertinent labs, Weight, Symptoms    Tania Thakkar RD  Time Spent: 20 min

## 2025-07-29 ENCOUNTER — APPOINTMENT (OUTPATIENT)
Dept: GENERAL RADIOLOGY | Facility: HOSPITAL | Age: 50
End: 2025-07-29
Payer: MEDICAID

## 2025-07-29 ENCOUNTER — APPOINTMENT (OUTPATIENT)
Dept: CARDIOLOGY | Facility: HOSPITAL | Age: 50
End: 2025-07-29
Payer: MEDICAID

## 2025-07-29 PROBLEM — I31.39 PERICARDIAL EFFUSION: Status: ACTIVE | Noted: 2025-07-29

## 2025-07-29 PROBLEM — I48.91 ATRIAL FIBRILLATION WITH RVR: Status: ACTIVE | Noted: 2025-07-29

## 2025-07-29 LAB
ANION GAP SERPL CALCULATED.3IONS-SCNC: 12 MMOL/L (ref 5–15)
BASOPHILS # BLD AUTO: 0.08 10*3/MM3 (ref 0–0.2)
BASOPHILS NFR BLD AUTO: 0.8 % (ref 0–1.5)
BH CV VAS BP LEFT ARM: NORMAL MMHG
BUN SERPL-MCNC: 7.9 MG/DL (ref 6–20)
BUN/CREAT SERPL: 11.8 (ref 7–25)
CALCIUM SPEC-SCNC: 8.8 MG/DL (ref 8.6–10.5)
CHLORIDE SERPL-SCNC: 102 MMOL/L (ref 98–107)
CO2 SERPL-SCNC: 22 MMOL/L (ref 22–29)
CREAT SERPL-MCNC: 0.67 MG/DL (ref 0.76–1.27)
D-LACTATE SERPL-SCNC: 0.7 MMOL/L (ref 0.5–2)
DEPRECATED RDW RBC AUTO: 36 FL (ref 37–54)
EGFRCR SERPLBLD CKD-EPI 2021: 113.7 ML/MIN/1.73
EOSINOPHIL # BLD AUTO: 0.4 10*3/MM3 (ref 0–0.4)
EOSINOPHIL NFR BLD AUTO: 4.1 % (ref 0.3–6.2)
ERYTHROCYTE [DISTWIDTH] IN BLOOD BY AUTOMATED COUNT: 11.3 % (ref 12.3–15.4)
GEN 5 1HR TROPONIN T REFLEX: 7 NG/L
GLUCOSE BLDC GLUCOMTR-MCNC: 102 MG/DL (ref 70–130)
GLUCOSE BLDC GLUCOMTR-MCNC: 111 MG/DL (ref 70–130)
GLUCOSE BLDC GLUCOMTR-MCNC: 124 MG/DL (ref 70–130)
GLUCOSE BLDC GLUCOMTR-MCNC: 97 MG/DL (ref 70–130)
GLUCOSE SERPL-MCNC: 123 MG/DL (ref 65–99)
HCT VFR BLD AUTO: 36.9 % (ref 37.5–51)
HGB BLD-MCNC: 12.6 G/DL (ref 13–17.7)
IMM GRANULOCYTES # BLD AUTO: 0.06 10*3/MM3 (ref 0–0.05)
IMM GRANULOCYTES NFR BLD AUTO: 0.6 % (ref 0–0.5)
LYMPHOCYTES # BLD AUTO: 1.06 10*3/MM3 (ref 0.7–3.1)
LYMPHOCYTES NFR BLD AUTO: 11 % (ref 19.6–45.3)
MAGNESIUM SERPL-MCNC: 2.1 MG/DL (ref 1.6–2.6)
MCH RBC QN AUTO: 29.5 PG (ref 26.6–33)
MCHC RBC AUTO-ENTMCNC: 34.1 G/DL (ref 31.5–35.7)
MCV RBC AUTO: 86.4 FL (ref 79–97)
MONOCYTES # BLD AUTO: 1.01 10*3/MM3 (ref 0.1–0.9)
MONOCYTES NFR BLD AUTO: 10.4 % (ref 5–12)
NEUTROPHILS NFR BLD AUTO: 7.07 10*3/MM3 (ref 1.7–7)
NEUTROPHILS NFR BLD AUTO: 73.1 % (ref 42.7–76)
NRBC BLD AUTO-RTO: 0 /100 WBC (ref 0–0.2)
PLATELET # BLD AUTO: 482 10*3/MM3 (ref 140–450)
PMV BLD AUTO: 9.2 FL (ref 6–12)
POTASSIUM SERPL-SCNC: 3.7 MMOL/L (ref 3.5–5.2)
PROCALCITONIN SERPL-MCNC: 0.04 NG/ML (ref 0–0.25)
QT INTERVAL: 320 MS
QTC INTERVAL: 465 MS
RBC # BLD AUTO: 4.27 10*6/MM3 (ref 4.14–5.8)
SODIUM SERPL-SCNC: 136 MMOL/L (ref 136–145)
TROPONIN T NUMERIC DELTA: NORMAL
TROPONIN T SERPL HS-MCNC: <6 NG/L
WBC NRBC COR # BLD AUTO: 9.68 10*3/MM3 (ref 3.4–10.8)

## 2025-07-29 PROCEDURE — 83735 ASSAY OF MAGNESIUM: CPT | Performed by: INTERNAL MEDICINE

## 2025-07-29 PROCEDURE — 93005 ELECTROCARDIOGRAM TRACING: CPT | Performed by: NURSE PRACTITIONER

## 2025-07-29 PROCEDURE — 71045 X-RAY EXAM CHEST 1 VIEW: CPT

## 2025-07-29 PROCEDURE — 84484 ASSAY OF TROPONIN QUANT: CPT | Performed by: INTERNAL MEDICINE

## 2025-07-29 PROCEDURE — 93308 TTE F-UP OR LMTD: CPT

## 2025-07-29 PROCEDURE — 93321 DOPPLER ECHO F-UP/LMTD STD: CPT | Performed by: INTERNAL MEDICINE

## 2025-07-29 PROCEDURE — 84145 PROCALCITONIN (PCT): CPT | Performed by: NURSE PRACTITIONER

## 2025-07-29 PROCEDURE — 80048 BASIC METABOLIC PNL TOTAL CA: CPT | Performed by: INTERNAL MEDICINE

## 2025-07-29 PROCEDURE — 93005 ELECTROCARDIOGRAM TRACING: CPT | Performed by: INTERNAL MEDICINE

## 2025-07-29 PROCEDURE — 25010000002 MORPHINE PER 10 MG: Performed by: INTERNAL MEDICINE

## 2025-07-29 PROCEDURE — 99232 SBSQ HOSP IP/OBS MODERATE 35: CPT | Performed by: NURSE PRACTITIONER

## 2025-07-29 PROCEDURE — 25010000002 CEFEPIME PER 500 MG: Performed by: NURSE PRACTITIONER

## 2025-07-29 PROCEDURE — 93308 TTE F-UP OR LMTD: CPT | Performed by: INTERNAL MEDICINE

## 2025-07-29 PROCEDURE — 83605 ASSAY OF LACTIC ACID: CPT | Performed by: NURSE PRACTITIONER

## 2025-07-29 PROCEDURE — 25010000002 AMIODARONE IN DEXTROSE 5% 360-4.14 MG/200ML-% SOLUTION: Performed by: NURSE PRACTITIONER

## 2025-07-29 PROCEDURE — 93010 ELECTROCARDIOGRAM REPORT: CPT | Performed by: INTERNAL MEDICINE

## 2025-07-29 PROCEDURE — 99232 SBSQ HOSP IP/OBS MODERATE 35: CPT | Performed by: INTERNAL MEDICINE

## 2025-07-29 PROCEDURE — 85025 COMPLETE CBC W/AUTO DIFF WBC: CPT | Performed by: INTERNAL MEDICINE

## 2025-07-29 PROCEDURE — 82948 REAGENT STRIP/BLOOD GLUCOSE: CPT

## 2025-07-29 PROCEDURE — 93321 DOPPLER ECHO F-UP/LMTD STD: CPT

## 2025-07-29 PROCEDURE — 25010000002 AMIODARONE IN DEXTROSE 5% 150-4.21 MG/100ML-% SOLUTION: Performed by: NURSE PRACTITIONER

## 2025-07-29 RX ORDER — DILTIAZEM HCL/D5W 125 MG/125
5-15 PLASTIC BAG, INJECTION (ML) INTRAVENOUS
Status: DISCONTINUED | OUTPATIENT
Start: 2025-07-29 | End: 2025-07-29

## 2025-07-29 RX ORDER — MORPHINE SULFATE 2 MG/ML
1 INJECTION, SOLUTION INTRAMUSCULAR; INTRAVENOUS ONCE
Status: COMPLETED | OUTPATIENT
Start: 2025-07-29 | End: 2025-07-29

## 2025-07-29 RX ADMIN — MORPHINE SULFATE 1 MG: 2 INJECTION, SOLUTION INTRAMUSCULAR; INTRAVENOUS at 08:26

## 2025-07-29 RX ADMIN — AMIODARONE HYDROCHLORIDE 1 MG/MIN: 1.8 INJECTION, SOLUTION INTRAVENOUS at 09:53

## 2025-07-29 RX ADMIN — ASPIRIN 650 MG: 325 TABLET, COATED ORAL at 21:05

## 2025-07-29 RX ADMIN — ROSUVASTATIN CALCIUM 20 MG: 20 TABLET, FILM COATED ORAL at 08:08

## 2025-07-29 RX ADMIN — AMLODIPINE BESYLATE 10 MG: 10 TABLET ORAL at 08:08

## 2025-07-29 RX ADMIN — CARVEDILOL 3.12 MG: 3.12 TABLET, FILM COATED ORAL at 08:08

## 2025-07-29 RX ADMIN — MORPHINE SULFATE 1 MG: 2 INJECTION, SOLUTION INTRAMUSCULAR; INTRAVENOUS at 07:25

## 2025-07-29 RX ADMIN — TRAZODONE HYDROCHLORIDE 100 MG: 100 TABLET ORAL at 21:17

## 2025-07-29 RX ADMIN — AMIODARONE HYDROCHLORIDE 0.5 MG/MIN: 1.8 INJECTION, SOLUTION INTRAVENOUS at 15:43

## 2025-07-29 RX ADMIN — FAMOTIDINE 20 MG: 20 TABLET, FILM COATED ORAL at 21:05

## 2025-07-29 RX ADMIN — CLOPIDOGREL BISULFATE 75 MG: 75 TABLET, FILM COATED ORAL at 08:08

## 2025-07-29 RX ADMIN — COLCHICINE 0.6 MG: 0.6 TABLET ORAL at 21:05

## 2025-07-29 RX ADMIN — CARVEDILOL 3.12 MG: 3.12 TABLET, FILM COATED ORAL at 18:10

## 2025-07-29 RX ADMIN — FLUOXETINE HYDROCHLORIDE 10 MG: 10 CAPSULE ORAL at 08:08

## 2025-07-29 RX ADMIN — CEFEPIME 1000 MG: 1 INJECTION, POWDER, FOR SOLUTION INTRAMUSCULAR; INTRAVENOUS at 23:46

## 2025-07-29 RX ADMIN — COLCHICINE 0.6 MG: 0.6 TABLET ORAL at 08:08

## 2025-07-29 RX ADMIN — NICOTINE 1 PATCH: 14 PATCH TRANSDERMAL at 21:06

## 2025-07-29 RX ADMIN — MORPHINE SULFATE 1 MG: 2 INJECTION, SOLUTION INTRAMUSCULAR; INTRAVENOUS at 18:19

## 2025-07-29 RX ADMIN — AMIODARONE HYDROCHLORIDE 150 MG: 1.5 INJECTION, SOLUTION INTRAVENOUS at 09:17

## 2025-07-29 RX ADMIN — ASPIRIN 650 MG: 325 TABLET, COATED ORAL at 08:08

## 2025-07-29 RX ADMIN — LOSARTAN POTASSIUM 25 MG: 25 TABLET, FILM COATED ORAL at 08:08

## 2025-07-29 RX ADMIN — ACETAMINOPHEN 650 MG: 325 TABLET ORAL at 21:17

## 2025-07-29 RX ADMIN — FAMOTIDINE 20 MG: 20 TABLET, FILM COATED ORAL at 08:08

## 2025-07-29 NOTE — PLAN OF CARE
Goal Outcome Evaluation:  Plan of Care Reviewed With: patient        Progress: improving  Outcome Evaluation: pt on room and in NSR. PT still complaining of pain in lower left chest and low back when breathing or moving. Given PRN morphine for pain with relief. PT slept through the night

## 2025-07-29 NOTE — PROGRESS NOTES
[facility] Medicine Services  PROGRESS NOTE    Patient Name: Wilmer Stover  : 1975  MRN: [id number]    Date of Admission: 2025  Primary Care Physician: Mata Maher MD    Subjective   Subjective     CC:  F/u chest pain    HPI:  Patient developed increasing chest pressure, palpitations and shortness of breath this morning, found to be in A.fib RVR      Objective   Objective     Vital Signs:   Temp:  [98.1 °F (36.7 °C)-99.9 °F (37.7 °C)] 98.6 °F (37 °C)  Heart Rate:  [] 124  Resp:  [16-18] 18  BP: ()/(70-94) 99/70  Flow (L/min) (Oxygen Therapy):  [2] 2     Physical Exam:  Constitutional: No acute distress, awake, alert  HENT: NCAT, mucous membranes moist  Respiratory: Clear to auscultation bilaterally, respiratory effort normal   Cardiovascular: tachycardic, irregular, no murmurs, rubs, or gallops  Gastrointestinal: soft, nontender, nondistended  Musculoskeletal: No bilateral ankle edema  Psychiatric: Appropriate affect, cooperative  Neurologic: Oriented x 3, speech clear, no focal deficits  Skin: No rashes      Results Reviewed:  LAB RESULTS:      Lab 25  0919 25  0815 25  0421 25  0418 25  2239 25  1809 25  1705   WBC  --  9.68 11.74* 11.44*  --   --  16.52*   HEMOGLOBIN  --  12.6* 12.7* 12.4*  --   --  15.1   HEMATOCRIT  --  36.9* 38.2 37.8  --   --  45.0   PLATELETS  --  482* 478* 431  --   --  599*   NEUTROS ABS  --  7.07* 7.83* 7.95*  --   --  13.11*   IMMATURE GRANS (ABS)  --  0.06* 0.05 0.06*  --   --  0.06*   LYMPHS ABS  --  1.06 1.97 1.54  --   --  1.59   MONOS ABS  --  1.01* 1.49* 1.53*  --   --  1.57*   EOS ABS  --  0.40 0.33 0.30  --   --  0.12   MCV  --  86.4 87.8 87.9  --   --  86.5   SED RATE  --   --   --  51*  --   --   --    CRP  --   --   --  18.30*  --   --   --    PROCALCITONIN  --   --   --  0.09  --   --  0.06   LACTATE  --   --   --  0.9 0.7  --   --    PROTIME  --   --   --  16.4*  --   --    --    APTT  --   --   --  36.3*  --   --   --    D DIMER QUANT  --   --   --   --   --   --  5.75*   HSTROP T 7 <6  --  10 43*   < > 9    < > = values in this interval not displayed.         Lab 07/29/25  0815 07/28/25  0421 07/27/25 0418 07/26/25 1809 07/26/25  1705   SODIUM 136 139 137  --  136   POTASSIUM 3.7 4.4 3.9  --  3.9   CHLORIDE 102 104 106  --  99   CO2 22.0 23.0 18.9*  --  23.5   ANION GAP 12.0 12.0 12.1  --  13.5   BUN 7.9 6.3 8.9  --  8.7   CREATININE 0.67* 0.88 0.75*  --  0.98   EGFR 113.7 104.8 109.9  --  93.9   GLUCOSE 123* 89 73  --  126*   CALCIUM 8.8 9.2 8.4*  --  9.8   MAGNESIUM 2.1  --  2.2 2.2  --    PHOSPHORUS  --   --  2.5 3.2  --    HEMOGLOBIN A1C  --   --  5.41  --   --          Lab 07/27/25 0418 07/26/25  1705   TOTAL PROTEIN 6.6 8.6*   ALBUMIN 3.3* 4.3   GLOBULIN 3.3 4.3   ALT (SGPT) 15 19   AST (SGOT) 20 24   BILIRUBIN 0.5 0.9   ALK PHOS 85 114   LIPASE  --  18         Lab 07/29/25  0919 07/29/25  0815 07/27/25 0418 07/26/25 2239 07/26/25 1809 07/26/25  1705   PROBNP  --   --   --   --  223.0 190.0   HSTROP T 7 <6 10 43* 10 9   PROTIME  --   --  16.4*  --   --   --    INR  --   --  1.25*  --   --   --          Lab 07/27/25 0418   CHOLESTEROL 95   LDL CHOL 56   HDL CHOL 25*   TRIGLYCERIDES 61             Lab 07/26/25  2358   PH, ARTERIAL 7.438   PCO2, ARTERIAL 35.2   PO2 ART 74.1*   FIO2 21   HCO3 ART 23.8   BASE EXCESS ART 0.0   CARBOXYHEMOGLOBIN 1.6     Brief Urine Lab Results  (Last result in the past 365 days)        Color   Clarity   Blood   Leuk Est   Nitrite   Protein   CREAT   Urine HCG        07/27/25 0101 [place]   Cloudy   Trace   Trace   Negative   30 mg/dL (1+)                   Microbiology Results Abnormal       None            XR Chest 1 View  Result Date: 7/29/2025  XR CHEST 1 VW Date of Exam: 7/29/2025 8:06 AM EDT Indication: Chest pain Comparison: 7/26/2025. Findings: The heart is enlarged. There are bandlike densities in the medial right lung base and in the  left lung base probably attributed atelectasis especially when compared to the CT angiogram of the chest from 7/26/2025. There are no layering pleural effusions.  There is a subtle mass in the right apex which was noted on the CT angiogram of the chest from 7/26/2025 concerning for malignancy. A PET/CT exam was recommended for follow-up. The lungs are otherwise clear. The pulmonary vascular markings are normal. The bony thorax is normal for age.     Impression: Impression: 1.Cardiomegaly. 2.Bandlike densities in the medial right lung base and in the left lung base probably attributed to atelectasis. 3.Subtle mass in the right apex which was noted on the CT angiogram of the chest from 7/26/2025 concerning for malignancy. Electronically Signed: Noe Mcclure MD  7/29/2025 8:45 AM EDT  Workstation ID: [id number]      Results for orders placed during the hospital encounter of 07/26/25    Adult Transthoracic Echo Limited W/ Cont if Necessary Per Protocol 07/29/2025 10:48 AM    Interpretation Summary    There is a moderate (1-2cm) pericardial effusion. There is no evidence of cardiac tamponade.      Current medications:  Scheduled Meds:[Held by provider] amLODIPine, 10 mg, Oral, Daily  aspirin, 650 mg, Oral, BID  carvedilol, 3.125 mg, Oral, BID With Meals  clopidogrel, 75 mg, Oral, Daily  colchicine, 0.6 mg, Oral, Q12H  famotidine, 20 mg, Oral, BID  FLUoxetine, 10 mg, Oral, Daily  insulin lispro, 2-7 Units, Subcutaneous, 4x Daily AC & at Bedtime  [Held by provider] losartan, 25 mg, Oral, Q24H  nicotine, 1 patch, Transdermal, Q24H  rosuvastatin, 20 mg, Oral, Daily      Continuous Infusions:amiodarone, 1 mg/min, Last Rate: 1 mg/min (07/29/25 0953)   Followed by  amiodarone, 0.5 mg/min        PRN Meds:.  acetaminophen    senna-docusate sodium **AND** polyethylene glycol **AND** bisacodyl **AND** bisacodyl    Calcium Replacement - Follow Nurse / BPA Driven Protocol    dextrose    dextrose    glucagon (human recombinant)     ipratropium-albuterol    Magnesium Cardiology Dose Replacement - Follow Nurse / BPA Driven Protocol    Morphine **AND** naloxone    nicotine polacrilex    nitroglycerin    ondansetron    Phosphorus Replacement - Follow Nurse / BPA Driven Protocol    Potassium Replacement - Follow Nurse / BPA Driven Protocol    sodium chloride    traZODone    Assessment & Plan   Assessment & Plan     Active Hospital Problems    Diagnosis  POA    **Pericarditis [I31.9]  Yes    Atrial fibrillation with RVR [I48.91]  Yes    Pericardial effusion [I31.39]  Yes    Mass of upper lobe of right lung [R91.8]  Yes    Type 2 diabetes mellitus without complication, without long-term current use of insulin [E11.9]  Yes    Personal history of nicotine dependence [Z87.891]  Not Applicable    Nonrheumatic aortic valve insufficiency [I35.1]  Yes    Coronary artery disease involving native coronary artery of native heart [I25.10]  Yes    Essential hypertension [I10]  Yes    Hyperlipidemia LDL goal <50 [E78.5]  Yes    Hepatitis C [B19.20]  Yes      Resolved Hospital Problems   No resolved problems to display.        Brief Hospital Course to date:  Wilmer Stover is a 50 y.o. male history of Hodgkin's lymphoma (in remission for 13-14 years), CAD s/p recent cardiac stent placement, and diabetes, presenting with recurrent headache, dizziness, chest pain, and breathing difficulties that started yesterday and continued today. He reports chest pain mostly on inspiration.     Enlarging Pericardial Effusion  Recurrent Pericarditis  - repeated limited echo STAT this morning given worsening chest pain and shortness of breath, still showing 1-2cm moderate pericardial effusion, no evidence for tamponade  - Cardiology following, started on high dose ASA, continue colchicine  - cardiology considering pericardiocentesis  - CRP/ESR elevated    New Onset A.fib RVR:  - new this morning, patient reports feeling similar palpitations while at home  - HR initially in  160's  - cardiology following, started amiodarone gtt  - continue coreg  - start AC pending need for procedures    RUL Pulmonary Mass  Hx Hodgkin's Lymphoma  - CT scan showing right apical opacity abutting the pleura with lobulated margins measuring 2.6 cm x 2.6 cm. Pulm consultation appreciated  - recommend outpatient PET and repeat CT imaging, if indicative of possible malignancy would proceed with biopsy if cardiology felt that it was okay to hold DAPT in setting of recent stent placement. Ideally would remain on DAPT for at least 90 days per cardiology.    Isolated Low Grade Fever   - no obvious infectious source, fever appears to be isolated event  - CT chest w/out pneumonia, UA negative, resp PCR negative, MRSA PCR negative  - GI PCR pending, blood cultures negative so far  - currently watching off antibiotics, low grade fever may be related to above process vs. Underlying undiagnosed malignany.    CAD s/p recent PCI  - continue DAPT  - continue statin, Beta-blocker    Elevated D-dimer:  - CTA negative for PE, Duplex negative for DVT  - d/c heparin    Tobacco Abuse:  - nicotine replacement  - cessation counseling    DMII:  - SSI coverage  - monitor FSBS    HTN:  - continue current regimen    HLD:  - continue statin    Expected Discharge Location and Transportation: Home  Expected Discharge   Expected Discharge Date: 7/29/2025; Expected Discharge Time: 10:00 AM     VTE Prophylaxis:  Mechanical VTE prophylaxis orders are present.         AM-PAC 6 Clicks Score (PT): 24 (07/29/25 3951)    CODE STATUS:   Code Status and Medical Interventions: CPR (Attempt to Resuscitate); Full Support   Ordered at: 07/26/25 7958     Code Status (Patient has no pulse and is not breathing):    CPR (Attempt to Resuscitate)     Medical Interventions (Patient has pulse or is breathing):    Full Support     Level Of Support Discussed With:    Patient       Ana PETER Maeve, DO  07/29/25

## 2025-07-29 NOTE — PROGRESS NOTES
Cardiology Progress Note      Reason for visit:    Pericarditis  Coronary artery disease with recent PCI  New onset atrial fibrillation with RVR    IDENTIFICATION: 50-year-old gentleman who resides in [place], [place]    Active Hospital Problems    Diagnosis  POA    **Pericarditis [I31.9]  Yes     Priority: High    Type 2 diabetes mellitus without complication, without long-term current use of insulin [E11.9]  Yes     Priority: High    Coronary artery disease involving native coronary artery of native heart [I25.10]  Yes     Priority: High     Cardiac catheterization for NSTEMI (7/12/2025): Severe 2-vessel CAD (proximal RCA, OM1).  Proximal RCA culprit for NSTEMI status post 4 x 23 mm LETICIA.  Recommend medical therapy for OM1 disease and as well as moderate diffuse multivessel disease.  Mildly elevated LV filling pressure (LVEDP 17 mmHg)      Mass of upper lobe of right lung [R91.8]  Yes     Priority: Medium    Essential hypertension [I10]  Yes     Priority: Medium     Target blood pressure <130/80 mmHg      Personal history of nicotine dependence [Z87.891]  Not Applicable     Priority: Low    Nonrheumatic aortic valve insufficiency [I35.1]  Yes     Priority: Low     Echo (7/13/2025): Normal LVEF.  Mild to moderate pericardial effusion with no tamponade.  Moderate aortic regurgitation present.      Hyperlipidemia LDL goal <50 [E78.5]  Yes     Priority: Low    Hepatitis C [B19.20]  Yes     Priority: Low            Patient went into atrial fibrillation with RVR earlier this morning.  Heart rates are in the 160s.  He reports chest discomfort.  He is now requiring oxygen at 3 L by nasal cannula.           Vital Sign Min/Max for last 24 hours  Temp  Min: 98.1 °F (36.7 °C)  Max: 99.9 °F (37.7 °C)   BP  Min: 106/74  Max: 115/81   Pulse  Min: 75  Max: 129   Resp  Min: 16  Max: 18   SpO2  Min: 91 %  Max: 96 %   No data recorded    No intake or output data in the 24 hours ending 07/29/25 0812        Physical  Exam  Constitutional:       General: He is awake.   Cardiovascular:      Rate and Rhythm: Tachycardia present. Rhythm irregular.      Heart sounds: Murmur heard.   Pulmonary:      Effort: Pulmonary effort is normal.      Breath sounds: Decreased breath sounds present.   Musculoskeletal:      Right lower leg: No edema.      Left lower leg: No edema.   Skin:     General: Skin is warm and dry.   Neurological:      Mental Status: He is alert.   Psychiatric:         Behavior: Behavior is cooperative.         Tele: Atrial fibrillation with RVR    Results Review (reviewed the patient's recent labs in the electronic medical record):     EKG (7/29/2025): Atrial fibrillation with RVR, T wave abnormality lateral leads    CXR (7/29/2020): Pending    Echo (7/27/2025): LVEF 56-60%.  Moderate AI.  Mild MR.  Moderate 1-2 cm pericardial effusion without tamponade.  Compared to prior echo 7/13 pericardial effusion is increased    Results from last 7 days   Lab Units 07/28/25  0421 07/27/25 0418 07/26/25  1809 07/26/25  1705   SODIUM mmol/L 139 137  --  136   POTASSIUM mmol/L 4.4 3.9  --  3.9   CHLORIDE mmol/L 104 106  --  99   BUN mg/dL 6.3 8.9  --  8.7   CREATININE mg/dL 0.88 0.75*  --  0.98   MAGNESIUM mg/dL  --  2.2 2.2  --        Results from last 7 days   Lab Units 07/27/25  0418 07/26/25  2239 07/26/25  1809   HSTROP T ng/L 10 43* 10       Results from last 7 days   Lab Units 07/28/25  0421 07/27/25  0418 07/26/25  1705   WBC 10*3/mm3 11.74* 11.44* 16.52*   HEMOGLOBIN g/dL 12.7* 12.4* 15.1   HEMATOCRIT % 38.2 37.8 45.0   PLATELETS 10*3/mm3 478* 431 599*       Lab Results   Component Value Date    HGBA1C 5.41 07/27/2025       Lab Results   Component Value Date    CHOL 95 07/27/2025    TRIG 61 07/27/2025    HDL 25 (L) 07/27/2025    LDL 56 07/27/2025              Pericarditis/pericardial effusion  Colchicine 0.6 mg twice daily  Aspirin 650 mg twice daily  Recent PCI and EKG suggested pericarditis.  Was discharged on  colchicine  Back with chest pain this admission.  Repeat echo shows pericardial effusion 1-2 cm without tamponade. Pericardial effusion has worsened since previous echo earlier in July  Troponins trending down 43, 10 and less than 6      Atrial fibrillation with RVR  New onset this admission      Coronary artery disease  Recent NSTEMI 7/12/2025 with PCI to proximal RCA.  Medical therapy for OM 1 disease and moderate diffuse multivessel disease  DAPT with aspirin and Plavix      Hypertension  Current /81  Losartan 25 mg daily on hold  Carvedilol 3.125 mg twice daily  Amlodipine 10 mg daily on hold      Hyperlipidemia  Crestor 20 mg daily      Type 2 diabetes mellitus  Management per hospital medicine                     Start IV amiodarone bolus and drip per protocol  Stat echocardiogram for reevaluation of pericardial effusion  If echo still does not show tamponade but patient's chest pain symptoms not improved once heart rate controlled or back in NSR could still consider pericardiocentesis.  Keep patient n.p.o. for now    Electronically signed by LELA Lopez, 07/29/25, 8:12 AM EDT.

## 2025-07-29 NOTE — PLAN OF CARE
Goal Outcome Evaluation:           Progress: no change  Outcome Evaluation: Pt on room air. Currently NSR on monitor Pt was in afib rvr this am and symptomatic. Chest pain 6/10. Amio gtt infusing and pt converted to NSR. C/o pain relieved with morphine. VSS

## 2025-07-29 NOTE — CASE MANAGEMENT/SOCIAL WORK
Continued Stay Note  [facility]     Patient Name: Wilmer Stover  MRN: [id number]  Today's Date: 7/29/2025    Admit Date: 7/26/2025    Plan: Home   Discharge Plan       Row Name 07/29/25 1141       Plan    Plan Home    Plan Comments Discussed Mr Stover in MDR.  Unfortunately Mr Stover has gone into atrial fibrillation with RVR. He is currently on an amio drip.  PT and OT have worked with him and are recommending that he return home at discharge. Case management will continue to follow.    Final Discharge Disposition Code 01 - home or self-care                   Discharge Codes    No documentation.                 Expected Discharge Date and Time       Expected Discharge Date Expected Discharge Time    Jul 29, 2025 10:00 AM              Viji Razo RN

## 2025-07-30 LAB
GLUCOSE BLDC GLUCOMTR-MCNC: 82 MG/DL (ref 70–130)
GLUCOSE BLDC GLUCOMTR-MCNC: 90 MG/DL (ref 70–130)

## 2025-07-30 PROCEDURE — 25010000002 MORPHINE PER 10 MG: Performed by: INTERNAL MEDICINE

## 2025-07-30 PROCEDURE — 25010000002 CEFEPIME PER 500 MG: Performed by: NURSE PRACTITIONER

## 2025-07-30 PROCEDURE — 93005 ELECTROCARDIOGRAM TRACING: CPT | Performed by: NURSE PRACTITIONER

## 2025-07-30 PROCEDURE — 82948 REAGENT STRIP/BLOOD GLUCOSE: CPT

## 2025-07-30 PROCEDURE — 99232 SBSQ HOSP IP/OBS MODERATE 35: CPT | Performed by: INTERNAL MEDICINE

## 2025-07-30 PROCEDURE — 25010000002 AMIODARONE IN DEXTROSE 5% 360-4.14 MG/200ML-% SOLUTION: Performed by: NURSE PRACTITIONER

## 2025-07-30 PROCEDURE — 99232 SBSQ HOSP IP/OBS MODERATE 35: CPT

## 2025-07-30 RX ORDER — AMIODARONE HYDROCHLORIDE 200 MG/1
200 TABLET ORAL EVERY 8 HOURS
Status: DISCONTINUED | OUTPATIENT
Start: 2025-07-30 | End: 2025-07-30

## 2025-07-30 RX ORDER — AMIODARONE HYDROCHLORIDE 200 MG/1
200 TABLET ORAL DAILY
Status: DISCONTINUED | OUTPATIENT
Start: 2025-08-20 | End: 2025-07-30

## 2025-07-30 RX ORDER — SENNOSIDES 8.6 MG
650 CAPSULE ORAL 4 TIMES DAILY
Status: DISCONTINUED | OUTPATIENT
Start: 2025-07-30 | End: 2025-07-31 | Stop reason: HOSPADM

## 2025-07-30 RX ORDER — AMIODARONE HYDROCHLORIDE 200 MG/1
200 TABLET ORAL EVERY 12 HOURS
Status: DISCONTINUED | OUTPATIENT
Start: 2025-08-06 | End: 2025-07-30

## 2025-07-30 RX ORDER — COLCHICINE 0.6 MG/1
0.6 TABLET ORAL DAILY
Status: DISCONTINUED | OUTPATIENT
Start: 2025-07-31 | End: 2025-07-31 | Stop reason: HOSPADM

## 2025-07-30 RX ORDER — AMIODARONE HYDROCHLORIDE 200 MG/1
200 TABLET ORAL
Status: DISCONTINUED | OUTPATIENT
Start: 2025-07-30 | End: 2025-07-31 | Stop reason: HOSPADM

## 2025-07-30 RX ADMIN — Medication 10 ML: at 08:24

## 2025-07-30 RX ADMIN — FAMOTIDINE 20 MG: 20 TABLET, FILM COATED ORAL at 21:27

## 2025-07-30 RX ADMIN — CARVEDILOL 3.12 MG: 3.12 TABLET, FILM COATED ORAL at 17:03

## 2025-07-30 RX ADMIN — ACETAMINOPHEN 650 MG: 325 TABLET ORAL at 11:49

## 2025-07-30 RX ADMIN — MORPHINE SULFATE 1 MG: 2 INJECTION, SOLUTION INTRAMUSCULAR; INTRAVENOUS at 00:31

## 2025-07-30 RX ADMIN — TRAZODONE HYDROCHLORIDE 100 MG: 100 TABLET ORAL at 21:27

## 2025-07-30 RX ADMIN — ASPIRIN 650 MG: 325 TABLET, COATED ORAL at 21:27

## 2025-07-30 RX ADMIN — AMIODARONE HYDROCHLORIDE 200 MG: 200 TABLET ORAL at 14:13

## 2025-07-30 RX ADMIN — CEFEPIME 1000 MG: 1 INJECTION, POWDER, FOR SOLUTION INTRAMUSCULAR; INTRAVENOUS at 14:15

## 2025-07-30 RX ADMIN — FLUOXETINE HYDROCHLORIDE 10 MG: 10 CAPSULE ORAL at 08:27

## 2025-07-30 RX ADMIN — NICOTINE 1 PATCH: 14 PATCH TRANSDERMAL at 21:31

## 2025-07-30 RX ADMIN — CLOPIDOGREL BISULFATE 75 MG: 75 TABLET, FILM COATED ORAL at 08:27

## 2025-07-30 RX ADMIN — ASPIRIN 650 MG: 325 TABLET, COATED ORAL at 17:03

## 2025-07-30 RX ADMIN — CEFEPIME 1000 MG: 1 INJECTION, POWDER, FOR SOLUTION INTRAMUSCULAR; INTRAVENOUS at 06:46

## 2025-07-30 RX ADMIN — AMIODARONE HYDROCHLORIDE 0.5 MG/MIN: 1.8 INJECTION, SOLUTION INTRAVENOUS at 02:58

## 2025-07-30 RX ADMIN — COLCHICINE 0.6 MG: 0.6 TABLET ORAL at 08:27

## 2025-07-30 RX ADMIN — FAMOTIDINE 20 MG: 20 TABLET, FILM COATED ORAL at 08:26

## 2025-07-30 RX ADMIN — ASPIRIN 650 MG: 325 TABLET, COATED ORAL at 08:26

## 2025-07-30 RX ADMIN — ROSUVASTATIN CALCIUM 20 MG: 20 TABLET, FILM COATED ORAL at 08:27

## 2025-07-30 RX ADMIN — MORPHINE SULFATE 1 MG: 2 INJECTION, SOLUTION INTRAMUSCULAR; INTRAVENOUS at 21:28

## 2025-07-30 RX ADMIN — CARVEDILOL 3.12 MG: 3.12 TABLET, FILM COATED ORAL at 08:26

## 2025-07-30 NOTE — PROGRESS NOTES
[facility] Medicine Services  PROGRESS NOTE    Patient Name: Wilmer Stover  : 1975  MRN: [id number]    Date of Admission: 2025  Primary Care Physician: Mata Maher MD    Subjective   Subjective     CC:  F/u chest pain    HPI:  Patient continues to have chest pain/discomfort but states it is improved today, his HR has improved on amiodarone.      Objective   Objective     Vital Signs:   Temp:  [98 °F (36.7 °C)-100.9 °F (38.3 °C)] 98.8 °F (37.1 °C)  Heart Rate:  [75-94] 83  Resp:  [18-20] 18  BP: ()/(60-81) 117/76  Flow (L/min) (Oxygen Therapy):  [2] 2     Physical Exam:  Constitutional: No acute distress, awake, alert  HENT: NCAT, mucous membranes moist  Respiratory: Clear to auscultation bilaterally, respiratory effort normal   Cardiovascular: NSR, no murmurs, rubs, or gallops  Gastrointestinal: soft, nontender, nondistended  Musculoskeletal: No bilateral ankle edema  Psychiatric: Appropriate affect, cooperative  Neurologic: Oriented x 3, speech clear, no focal deficits  Skin: No rashes      Results Reviewed:  LAB RESULTS:      Lab 25  2209 25  0919 25  0815 25  0421 25  0418 25  2239 25  1809 25  1705   WBC  --   --  9.68 11.74* 11.44*  --   --  16.52*   HEMOGLOBIN  --   --  12.6* 12.7* 12.4*  --   --  15.1   HEMATOCRIT  --   --  36.9* 38.2 37.8  --   --  45.0   PLATELETS  --   --  482* 478* 431  --   --  599*   NEUTROS ABS  --   --  7.07* 7.83* 7.95*  --   --  13.11*   IMMATURE GRANS (ABS)  --   --  0.06* 0.05 0.06*  --   --  0.06*   LYMPHS ABS  --   --  1.06 1.97 1.54  --   --  1.59   MONOS ABS  --   --  1.01* 1.49* 1.53*  --   --  1.57*   EOS ABS  --   --  0.40 0.33 0.30  --   --  0.12   MCV  --   --  86.4 87.8 87.9  --   --  86.5   SED RATE  --   --   --   --  51*  --   --   --    CRP  --   --   --   --  18.30*  --   --   --    PROCALCITONIN 0.04  --   --   --  0.09  --   --  0.06   LACTATE 0.7  --   --   --   0.9 0.7  --   --    PROTIME  --   --   --   --  16.4*  --   --   --    APTT  --   --   --   --  36.3*  --   --   --    D DIMER QUANT  --   --   --   --   --   --   --  5.75*   HSTROP T  --  7 <6  --  10 43*   < > 9    < > = values in this interval not displayed.         Lab 07/29/25 0815 07/28/25 0421 07/27/25 0418 07/26/25 1809 07/26/25  1705   SODIUM 136 139 137  --  136   POTASSIUM 3.7 4.4 3.9  --  3.9   CHLORIDE 102 104 106  --  99   CO2 22.0 23.0 18.9*  --  23.5   ANION GAP 12.0 12.0 12.1  --  13.5   BUN 7.9 6.3 8.9  --  8.7   CREATININE 0.67* 0.88 0.75*  --  0.98   EGFR 113.7 104.8 109.9  --  93.9   GLUCOSE 123* 89 73  --  126*   CALCIUM 8.8 9.2 8.4*  --  9.8   MAGNESIUM 2.1  --  2.2 2.2  --    PHOSPHORUS  --   --  2.5 3.2  --    HEMOGLOBIN A1C  --   --  5.41  --   --          Lab 07/27/25 0418 07/26/25  1705   TOTAL PROTEIN 6.6 8.6*   ALBUMIN 3.3* 4.3   GLOBULIN 3.3 4.3   ALT (SGPT) 15 19   AST (SGOT) 20 24   BILIRUBIN 0.5 0.9   ALK PHOS 85 114   LIPASE  --  18         Lab 07/29/25  0919 07/29/25  0815 07/27/25 0418 07/26/25 2239 07/26/25 1809 07/26/25  1705   PROBNP  --   --   --   --  223.0 190.0   HSTROP T 7 <6 10 43* 10 9   PROTIME  --   --  16.4*  --   --   --    INR  --   --  1.25*  --   --   --          Lab 07/27/25 0418   CHOLESTEROL 95   LDL CHOL 56   HDL CHOL 25*   TRIGLYCERIDES 61             Lab 07/26/25  2358   PH, ARTERIAL 7.438   PCO2, ARTERIAL 35.2   PO2 ART 74.1*   FIO2 21   HCO3 ART 23.8   BASE EXCESS ART 0.0   CARBOXYHEMOGLOBIN 1.6     Brief Urine Lab Results  (Last result in the past 365 days)        Color   Clarity   Blood   Leuk Est   Nitrite   Protein   CREAT   Urine HCG        07/27/25 0101 [place]   Cloudy   Trace   Trace   Negative   30 mg/dL (1+)                   Microbiology Results Abnormal       None            XR Chest 1 View  Result Date: 7/29/2025  XR CHEST 1 VW Date of Exam: 7/29/2025 8:06 AM EDT Indication: Chest pain Comparison: 7/26/2025. Findings: The heart is  enlarged. There are bandlike densities in the medial right lung base and in the left lung base probably attributed atelectasis especially when compared to the CT angiogram of the chest from 7/26/2025. There are no layering pleural effusions.  There is a subtle mass in the right apex which was noted on the CT angiogram of the chest from 7/26/2025 concerning for malignancy. A PET/CT exam was recommended for follow-up. The lungs are otherwise clear. The pulmonary vascular markings are normal. The bony thorax is normal for age.     Impression: Impression: 1.Cardiomegaly. 2.Bandlike densities in the medial right lung base and in the left lung base probably attributed to atelectasis. 3.Subtle mass in the right apex which was noted on the CT angiogram of the chest from 7/26/2025 concerning for malignancy. Electronically Signed: Noe Mcclure MD  7/29/2025 8:45 AM EDT  Workstation ID: [id number]      Results for orders placed during the hospital encounter of 07/26/25    Adult Transthoracic Echo Limited W/ Cont if Necessary Per Protocol 07/29/2025 10:48 AM    Interpretation Summary    There is a moderate (1-2cm) pericardial effusion. There is no evidence of cardiac tamponade.      Current medications:  Scheduled Meds:[Held by provider] amLODIPine, 10 mg, Oral, Daily  aspirin, 650 mg, Oral, BID  carvedilol, 3.125 mg, Oral, BID With Meals  cefepime, 1,000 mg, Intravenous, Q8H  clopidogrel, 75 mg, Oral, Daily  colchicine, 0.6 mg, Oral, Q12H  famotidine, 20 mg, Oral, BID  FLUoxetine, 10 mg, Oral, Daily  insulin lispro, 2-7 Units, Subcutaneous, 4x Daily AC & at Bedtime  [Held by provider] losartan, 25 mg, Oral, Q24H  nicotine, 1 patch, Transdermal, Q24H  rosuvastatin, 20 mg, Oral, Daily      Continuous Infusions:       PRN Meds:.  acetaminophen    senna-docusate sodium **AND** polyethylene glycol **AND** bisacodyl **AND** bisacodyl    Calcium Replacement - Follow Nurse / BPA Driven Protocol    dextrose    dextrose    glucagon (human  recombinant)    ipratropium-albuterol    Magnesium Cardiology Dose Replacement - Follow Nurse / BPA Driven Protocol    Morphine **AND** naloxone    nicotine polacrilex    nitroglycerin    ondansetron    Phosphorus Replacement - Follow Nurse / BPA Driven Protocol    Potassium Replacement - Follow Nurse / BPA Driven Protocol    sodium chloride    traZODone    Assessment & Plan   Assessment & Plan     Active Hospital Problems    Diagnosis  POA    **Pericarditis [I31.9]  Yes    Atrial fibrillation with RVR [I48.91]  Yes    Pericardial effusion [I31.39]  Yes    Mass of upper lobe of right lung [R91.8]  Yes    Type 2 diabetes mellitus without complication, without long-term current use of insulin [E11.9]  Yes    Personal history of nicotine dependence [Z87.891]  Not Applicable    Nonrheumatic aortic valve insufficiency [I35.1]  Yes    Coronary artery disease involving native coronary artery of native heart [I25.10]  Yes    Essential hypertension [I10]  Yes    Hyperlipidemia LDL goal <50 [E78.5]  Yes    Hepatitis C [B19.20]  Yes      Resolved Hospital Problems   No resolved problems to display.        Brief Hospital Course to date:  Wilmer Stover is a 50 y.o. male history of Hodgkin's lymphoma (in remission for 13-14 years), CAD s/p recent cardiac stent placement, and diabetes, presenting with recurrent headache, dizziness, chest pain, and breathing difficulties that started yesterday and continued today. He reports chest pain mostly on inspiration.     Enlarging Pericardial Effusion  Recurrent Pericarditis  - repeated limited echo STAT this morning given worsening chest pain and shortness of breath, still showing 1-2cm moderate pericardial effusion, no evidence for tamponade  - Cardiology following, started on high dose ASA, continue colchicine  - cardiology considering pericardiocentesis given ongoing symptoms  - CRP/ESR elevated    New Onset A.fib RVR:  - new 7/29  - HR initially in 160's, now back in NSR   -  cardiology following, on amiodarone protocol  - continue coreg  - start anticoagulation pending need for procedures    RUL Pulmonary Mass  Hx Hodgkin's Lymphoma  - CT scan showing right apical opacity abutting the pleura with lobulated margins measuring 2.6 cm x 2.6 cm. Pulm consultation appreciated  - recommend outpatient PET and repeat CT imaging, if indicative of possible malignancy would proceed with biopsy if cardiology felt that it was okay to hold DAPT in setting of recent stent placement. Ideally would remain on DAPT for at least 90 days per cardiology.    Low Grade Fever   - no obvious infectious source  - CT chest w/out pneumonia, UA negative, resp PCR negative, MRSA PCR negative  - blood cultures negative  - currently watching off antibiotics, low grade fever may be related to above process vs. Underlying undiagnosed malignany.    CAD s/p recent PCI  - continue DAPT  - continue statin, Beta-blocker    Elevated D-dimer:  - CTA negative for PE, Duplex negative for DVT  - d/c'd heparin    Tobacco Abuse:  - nicotine replacement  - cessation counseling    DMII:  - SSI coverage  - monitor FSBS    HTN:  - continue current regimen    HLD:  - continue statin    Expected Discharge Location and Transportation: Home  Expected Discharge   Expected Discharge Date: 7/29/2025; Expected Discharge Time: 10:00 AM     VTE Prophylaxis:  Mechanical VTE prophylaxis orders are present.         AM-PAC 6 Clicks Score (PT): 24 (07/29/25 2048)    CODE STATUS:   Code Status and Medical Interventions: CPR (Attempt to Resuscitate); Full Support   Ordered at: 07/26/25 1323     Code Status (Patient has no pulse and is not breathing):    CPR (Attempt to Resuscitate)     Medical Interventions (Patient has pulse or is breathing):    Full Support     Level Of Support Discussed With:    Patient       Ana ROME Maeve, DO  07/30/25

## 2025-07-30 NOTE — PROGRESS NOTES
"[facility] at [facility] Progress Note     LOS: 4 days   Patient Care Team:  Mata Maher MD as PCP - General (Family Medicine)  PCP:  Mata Maher MD    Chief Complaint: Recurrent pericarditis, CAD with recent PCI ,new A-fib RVR    Subjective: Patient still endorses pleuritic chest pain and describes it as static electricity upon breathing and with movement.  Patient states he still been able to get up and walk around      OBJECTIVE  REVIEW OF SYSTEMS:  @Cardiovascular ROS: positive for - chest pain@        Vital Sign Min/Max for last 24 hours  Temp  Min: 98 °F (36.7 °C)  Max: 100.9 °F (38.3 °C)   BP  Min: 97/60  Max: 108/68   Pulse  Min: 75  Max: 124   Resp  Min: 18  Max: 20   SpO2  Min: 94 %  Max: 98 %   No data recorded   Weight  Min: 78.4 kg (172 lb 14.4 oz)  Max: 78.4 kg (172 lb 14.4 oz)     Telemetry: NSR       I&O/ Weights:     Intake/Output Summary (Last 24 hours) at 7/30/2025 0937  Last data filed at 7/29/2025 1900  Gross per 24 hour   Intake 1050 ml   Output --   Net 1050 ml         07/29/25  0913 07/30/25  0546   Weight: 77.6 kg (171 lb) 78.4 kg (172 lb 14.4 oz)     Flowsheet Rows      Flowsheet Row First Filed Value   Admission Height 167.6 cm (66\") Documented at 07/26/2025 1426   Admission Weight 81.6 kg (180 lb) Documented at 07/26/2025 1426               Physical Exam:  Vitals reviewed.   Constitutional:       Appearance: Normal weight and not in distress.   Neck:      Vascular: No JVR. JVD normal.   Pulmonary:      Effort: Pulmonary effort is normal.      Breath sounds: Normal breath sounds.   Cardiovascular:      Normal rate. Regular rhythm.      Murmurs: There is no murmur.      No rub.   Pulses:     Intact distal pulses.   Edema:     Peripheral edema absent.   Abdominal:      Palpations: Abdomen is soft.   Musculoskeletal: Normal range of motion. Skin:     General: Skin is warm and dry.   Neurological:      General: No focal deficit present.      Mental " Status: Alert.   Psychiatric:         Behavior: Behavior is cooperative.            Labs:   Results from last 7 days   Lab Units 07/29/25  0815 07/28/25  0421 07/27/25  0418   WBC 10*3/mm3 9.68 11.74* 11.44*   HEMOGLOBIN g/dL 12.6* 12.7* 12.4*   HEMATOCRIT % 36.9* 38.2 37.8   PLATELETS 10*3/mm3 482* 478* 431     Lab Results   Lab Value Date/Time    TROPONINT 7 07/29/2025 0919    TROPONINT <6 07/29/2025 0815    TROPONINT 10 07/27/2025 0418    TROPONINT 43 (H) 07/26/2025 2239    TROPONINT 10 07/26/2025 1809    TROPONINT 9 07/26/2025 1705    TROPONINT 74 (C) 07/12/2025 0055    TROPONINT 93 (C) 07/11/2025 2122    TROPONINT 101 (C) 07/11/2025 2015    TROPONINT <6 06/12/2023 1127    TROPONINT <6 06/12/2023 0915    TROPONINT <0.010 12/27/2022 1406    TROPONINT <0.010 12/27/2022 1202     Results from last 7 days   Lab Units 07/27/25 0418   INR  1.25*   APTT seconds 36.3*     Results from last 7 days   Lab Units 07/29/25  0815 07/28/25  0421 07/27/25  0418 07/26/25  1705   SODIUM mmol/L 136 139 137 136   POTASSIUM mmol/L 3.7 4.4 3.9 3.9   CHLORIDE mmol/L 102 104 106 99   CO2 mmol/L 22.0 23.0 18.9* 23.5   BUN mg/dL 7.9 6.3 8.9 8.7   CREATININE mg/dL 0.67* 0.88 0.75* 0.98   CALCIUM mg/dL 8.8 9.2 8.4* 9.8   BILIRUBIN mg/dL  --   --  0.5 0.9   ALK PHOS U/L  --   --  85 114   ALT (SGPT) U/L  --   --  15 19   AST (SGOT) U/L  --   --  20 24   GLUCOSE mg/dL 123* 89 73 126*     Results from last 7 days   Lab Units 07/27/25  0418   HEMOGLOBIN A1C % 5.41     Results from last 7 days   Lab Units 07/27/25  0418   CHOLESTEROL mg/dL 95   TRIGLYCERIDES mg/dL 61   HDL CHOL mg/dL 25*   LDL CHOL mg/dL 56                   Medication Review:   [Held by provider] amLODIPine, 10 mg, Oral, Daily  aspirin, 650 mg, Oral, BID  carvedilol, 3.125 mg, Oral, BID With Meals  cefepime, 1,000 mg, Intravenous, Q8H  clopidogrel, 75 mg, Oral, Daily  colchicine, 0.6 mg, Oral, Q12H  famotidine, 20 mg, Oral, BID  FLUoxetine, 10 mg, Oral, Daily  insulin lispro,  2-7 Units, Subcutaneous, 4x Daily AC & at Bedtime  [Held by provider] losartan, 25 mg, Oral, Q24H  nicotine, 1 patch, Transdermal, Q24H  rosuvastatin, 20 mg, Oral, Daily       amiodarone, 0.5 mg/min, Last Rate: 0.5 mg/min (25 0258)         IMAGING/DIAGNOSTICS   EK/29  NSR has replaced Afib     CXR:   1.Cardiomegaly.  2.Bandlike densities in the medial right lung base and in the left lung base probably attributed to atelectasis.  3.Subtle mass in the right apex which was noted on the CT angiogram of the chest from 2025 concerning for malignancy.    Results for orders placed during the hospital encounter of 25    Adult Transthoracic Echo Limited W/ Cont if Necessary Per Protocol 2025 1048    Interpretation Summary    There is a moderate (1-2cm) pericardial effusion. There is no evidence of cardiac tamponade.      Problem List:   Patient Active Problem List    Diagnosis Date Noted    *Pericarditis 2025    Atrial fibrillation with RVR 2025    Pericardial effusion 2025     Note Last Updated: 2025     Echo (2025): LVEF 56 to 60%.  Pericardial effusion 1-2 cm without tamponade.  Compared to prior echo  pericardial effusion is worsened      Mass of upper lobe of right lung 2025    Type 2 diabetes mellitus without complication, without long-term current use of insulin 2025    Personal history of nicotine dependence 2025    Sepsis with acute organ dysfunction 2025    Acute pericarditis 2025     Note Last Updated: 2025     [facility] admission for chest pain, NSTEMI, and EKG evidence of pericarditis, 2025  Echo (2025): Normal LVEF.  Mild to moderate pericardial effusion with no tamponade.  Moderate aortic regurgitation present.      Nonrheumatic aortic valve insufficiency 2025     Note Last Updated: 2025     Echo (2025): Normal LVEF.  Mild to moderate pericardial effusion with no tamponade.  Moderate  aortic regurgitation present.      Essential hypertension 07/12/2025     Note Last Updated: 7/12/2025     Target blood pressure <130/80 mmHg      Coronary artery disease involving native coronary artery of native heart 07/12/2025     Note Last Updated: 7/12/2025     Cardiac catheterization for NSTEMI (7/12/2025): Severe 2-vessel CAD (proximal RCA, OM1).  Proximal RCA culprit for NSTEMI status post 4 x 23 mm LETICIA.  Recommend medical therapy for OM1 disease and as well as moderate diffuse multivessel disease.  Mildly elevated LV filling pressure (LVEDP 17 mmHg)      Hyperlipidemia LDL goal <50 07/12/2025    Hepatitis C 07/12/2025    NSTEMI (non-ST elevated myocardial infarction) 07/11/2025     Note Last Updated: 7/13/2025     Cardiac catheterization for NSTEMI (7/12/2025): Severe 2-vessel CAD (proximal RCA, OM1).  Proximal RCA culprit for NSTEMI status post 4 x 23 mm LETICIA.  Recommend medical therapy for OM1 disease and as well as moderate diffuse multivessel disease.  Mildly elevated LV filling pressure (LVEDP 17 mmHg)  Echo (7/13/2025): Normal LVEF.  Mild to moderate pericardial effusion with no tamponade.  Moderate aortic regurgitation present.             Assessment:  Afib RVR  New onset 7/29   IV Amio Bolus and gtt protocol --> Converted to NSR   Coreg 3.125 BID  Repeat STAT ECHO showed no change in pericardial effusion (1-2 cm) without evidence of tamponade.   Maintained on Amiodarone 200 mg daily   Recurrent pericarditis  Acute pericarditis 7/11 secondary to non-STEMI s/p PCI on 7/13  CRP/ESR elevated, low-grade fever with no signs of infection, elevated          D-dimer with CTA negative for PE and duplex negative for DVT  Limited echo 7/26 moderate pericardial effusion without tamponade, at 1-2 cm   ASA increased to 650 4x/day x7 days with taper down   Decreased colchicine 0.6 mg to daily dosing  If refractory IL-1 inhibitor should be considered  CAD  EF 56-60%  [facility] 7/13 x 1 LETICIA RCA  Continued on DAPT, statin,  beta-blocker  Essential hypertension  Low normal/normotensive  Losartan 25 mg daily, amlodipine 10 mg daily (placed on hold)   Coreg 3.125 BID  Current /68  RUL pulmonary mass  Incidental finding on CT scan 7/26 showing right apical lobulated density measuring 2.6 cm x 2.6 cm.  Pulmonology consulted  Pulmonology considering biopsy, however would not recommend holding Plavix until greater than 90 days post intervention.       Plan:   Coreg 3.125 mg BID yesterday for assisted HR control, BP's maintaining low normal   IV Amiodarone protocol has completed, pt. Maintaining NSR. Will transition to Amiodarone 200 mg daily.   Increasing ASA to 650 4x/ day x 7 days with taper down and colchicine 0.6 decreased to daily for ongoing symptoms.   Repeat CRP levels in the morning     LELA Saldana

## 2025-07-30 NOTE — PLAN OF CARE
Goal Outcome Evaluation:  Plan of Care Reviewed With: patient        Progress: improving  Outcome Evaluation: PT is on room air and NSR on the monitor. The patient is still complaining of lower left chest pain and lower back pain that is being treated with morphine. PT has been NPO since midnight

## 2025-07-30 NOTE — PLAN OF CARE
Goal Outcome Evaluation:  Pt up to BR w steady gait.  VSS on RA.  Afebrile.  BP soft at times.    NSR on tele all day.   Amio changed to po today.    IV AB cont.

## 2025-07-30 NOTE — CASE MANAGEMENT/SOCIAL WORK
Continued Stay Note   [place]     Patient Name: Wilmer Stover  MRN: [id number]  Today's Date: 7/30/2025    Admit Date: 7/26/2025    Plan: home   Discharge Plan       Row Name 07/30/25 0935       Plan    Plan home    Patient/Family in Agreement with Plan yes    Plan Comments I met with this patient bedside. CM offered the SDOH Resource sheet to the patient. He denies having any transport needs currently, but the sheet was left bedside. His plan remains home with his family to transport. CM to follow.    Final Discharge Disposition Code 01 - home or self-care                   Discharge Codes    No documentation.                 Expected Discharge Date and Time       Expected Discharge Date Expected Discharge Time    Jul 29, 2025 10:00 AM              Rosina Starr RN

## 2025-07-30 NOTE — PROGRESS NOTES
"          Clinical Nutrition Assessment     Patient Name: Wilmer Stover  YOB: 1975  MRN: [id number]  Date of Encounter: 07/29/25 20:16 EDT  Admission date: 7/26/2025  Reason for Visit: Follow-up protocol    Assessment   Nutrition Assessment   Admission Diagnosis:  Pericarditis [I31.9]    Problem List:    Pericarditis    Essential hypertension    Coronary artery disease involving native coronary artery of native heart    Hyperlipidemia LDL goal <50    Hepatitis C    Nonrheumatic aortic valve insufficiency    Mass of upper lobe of right lung    Type 2 diabetes mellitus without complication, without long-term current use of insulin    Personal history of nicotine dependence    Atrial fibrillation with RVR    Pericardial effusion      PMH:   He  has a past medical history of Allergic, Anxiety, Arthritis, Asthma, COPD (chronic obstructive pulmonary disease), Depression, Gall stones, Hepatitis C, Hodgkin's lymphoma, Hyperlipidemia, Hypertension, Kidney stone, Memory loss, Neuropathy, Numbness and tingling, Thyroid disease, and Weakness.    PSH:  He  has a past surgical history that includes Cholecystectomy; Mastectomy modified radical w/ axillary lymph nodes w/ or w/o pectoralis minor; cystoscopy bladder stone lithotripsy; Cardiac catheterization (N/A, 7/12/2025); Cardiac catheterization (N/A, 7/12/2025); and Cardiac catheterization (N/A, 7/12/2025).    Applicable Nutrition History:       Anthropometrics     Height: Height: 167.6 cm (65.98\")  Last Filed Weight: Weight: 77.6 kg (171 lb) (07/29/25 0913)  Method: Weight Method: Bed scale  BMI: BMI (Calculated): 27.6    UBW:  Per EMR wt of 190 lbs on 6/13/25 and on 7/12 /25 180 lbs on 7/21/25  Weight change: weight loss of 10 lbs (  5%) over 1 month(s)    Significant?  Yes    Nutrition Focused Physical Exam    Date: 7/29    Patient meets criteria for malnutrition diagnosis, see MSA note.     Subjective   Reported/Observed/Food/Nutrition Related History: "     7/29  Allows doing ok w food at this time.    7/28  Pt allows was 190 lbs a month ago and has been losing wt. Allows ate ok at home    Current Nutrition Prescription   PO: Diet: Regular/House, Cardiac, Diabetic; Healthy Heart (2-3 Na+); Consistent Carbohydrate; Texture: Regular (IDDSI 7); Fluid Consistency: Thin (IDDSI 0)  NPO Diet NPO Type: Strict NPO  Oral Nutrition Supplement:   Intake: 3 Days: 50% x 2 meals recorded    Assessment & Plan   Nutrition Diagnosis   Date:  7/28            Updated:  7/29  Problem Unintended weight loss    Etiology Nutrition demands of conditions including sepsis   Signs/Symptoms Loss 10 lbs 5% body wt within one month   Status: Active    Date:  7/29 Updated:  Problem Malnutrition non severe chronic   Etiology Possible inconsistent intake and nutrition demands of condition   Signs/Symptoms Wt hx w wasting   Status: New    Goal / Objectives:   Nutrition to support treatment and Increase intake      Nutrition Intervention      Follow treatment progress, Care plan reviewed, Advise alternate selection, Encourage intake, Supplement offered/refused    Follow for intake as able.    Monitoring/Evaluation:   Per protocol, I&O, PO intake, Pertinent labs, Weight, Symptoms    Tania Thakkar RD  Time Spent: 30 min

## 2025-07-30 NOTE — PROGRESS NOTES
Malnutrition Severity Assessment    Patient Name:  Wilmer Stover  YOB: 1975  MRN: [id number]  Admit Date:  7/26/2025    Patient meets criteria for : Moderate (non-severe) Malnutrition (Pt meets criteria for non severe chronic malnutriiton based on wt hx w wasting.)    Comments:      Malnutrition Severity Assessment  Malnutrition Type: Chronic Disease - Related Malnutrition  Malnutrition Type (Last 8 Hours)       Malnutrition Severity Assessment       Row Name 07/29/25 2024       Malnutrition Severity Assessment    Malnutrition Type Chronic Disease - Related Malnutrition      Row Name 07/29/25 2024       Insufficient Energy Intake     Insufficient Energy Intake Findings --  unable to quantify      Row Name 07/29/25 2024       Unintentional Weight Loss     Unintentional Weight Loss Findings Moderate    Unintentional Weight Loss  Weight loss of 5% in one month      Row Name 07/29/25 2024       Muscle Loss    Loss of Muscle Mass Findings --  < criterion    [place] Region --  mild    Clavicle Bone Region --  mild    Scapular Bone Region None    Dorsal Hand Region None    Patellar Region --  mild    Anterior Thigh Region None    Posterior Calf Region Moderate - some roundness, slight firmness      Row Name 07/29/25 2024       Fat Loss    Subcutaneous Fat Loss Findings Mild    Orbital Region  --  mild    Upper Arm Region None    Thoracic & Lumbar Region --  mild      Row Name 07/29/25 2024       Criteria Met (Must meet criteria for severity in at least 2 of these categories: M Wasting, Fat Loss, Fluid, Secondary Signs, Wt. Status, Intake)    Patient meets criteria for  Moderate (non-severe) Malnutrition  Pt meets criteria for non severe chronic malnutriiton based on wt hx w wasting.                    Electronically signed by:  Tania Thakkar RD  07/29/25 20:27 EDT

## 2025-07-31 ENCOUNTER — READMISSION MANAGEMENT (OUTPATIENT)
Dept: CALL CENTER | Facility: HOSPITAL | Age: 50
End: 2025-07-31
Payer: MEDICAID

## 2025-07-31 ENCOUNTER — HOSPITAL ENCOUNTER (INPATIENT)
Dept: CARDIOLOGY | Facility: HOSPITAL | Age: 50
Discharge: HOME | End: 2025-07-31
Payer: MEDICAID

## 2025-07-31 VITALS
OXYGEN SATURATION: 97 % | HEART RATE: 71 BPM | TEMPERATURE: 98.5 F | BODY MASS INDEX: 28.04 KG/M2 | WEIGHT: 174.5 LBS | HEIGHT: 66 IN | RESPIRATION RATE: 18 BRPM | DIASTOLIC BLOOD PRESSURE: 78 MMHG | SYSTOLIC BLOOD PRESSURE: 112 MMHG

## 2025-07-31 DIAGNOSIS — I48.91 ATRIAL FIBRILLATION WITH RVR: ICD-10-CM

## 2025-07-31 LAB
BACTERIA SPEC AEROBE CULT: NORMAL
BACTERIA SPEC AEROBE CULT: NORMAL
CRP SERPL-MCNC: 11.81 MG/DL (ref 0–0.5)
GLUCOSE BLDC GLUCOMTR-MCNC: 123 MG/DL (ref 70–130)
GLUCOSE BLDC GLUCOMTR-MCNC: 123 MG/DL (ref 70–130)

## 2025-07-31 PROCEDURE — 99232 SBSQ HOSP IP/OBS MODERATE 35: CPT | Performed by: NURSE PRACTITIONER

## 2025-07-31 PROCEDURE — 25010000002 MORPHINE PER 10 MG: Performed by: INTERNAL MEDICINE

## 2025-07-31 PROCEDURE — 25010000002 CEFEPIME PER 500 MG: Performed by: NURSE PRACTITIONER

## 2025-07-31 PROCEDURE — 99239 HOSP IP/OBS DSCHRG MGMT >30: CPT | Performed by: INTERNAL MEDICINE

## 2025-07-31 PROCEDURE — 86140 C-REACTIVE PROTEIN: CPT

## 2025-07-31 PROCEDURE — 82948 REAGENT STRIP/BLOOD GLUCOSE: CPT

## 2025-07-31 RX ORDER — SENNOSIDES 8.6 MG
650 CAPSULE ORAL 4 TIMES DAILY
Qty: 52 TABLET | Refills: 0 | Status: CANCELLED | OUTPATIENT
Start: 2025-07-31 | End: 2025-08-07

## 2025-07-31 RX ORDER — METOPROLOL TARTRATE 25 MG/1
25 TABLET, FILM COATED ORAL EVERY 12 HOURS SCHEDULED
Qty: 180 TABLET | Refills: 1 | Status: SHIPPED | OUTPATIENT
Start: 2025-07-31

## 2025-07-31 RX ORDER — SENNOSIDES 8.6 MG
650 CAPSULE ORAL 4 TIMES DAILY
Qty: 100 TABLET | Refills: 0 | Status: SHIPPED | OUTPATIENT
Start: 2025-07-31 | End: 2025-08-13

## 2025-07-31 RX ORDER — AMIODARONE HYDROCHLORIDE 200 MG/1
200 TABLET ORAL
Qty: 90 TABLET | Refills: 0 | Status: SHIPPED | OUTPATIENT
Start: 2025-07-31

## 2025-07-31 RX ORDER — METOPROLOL TARTRATE 25 MG/1
25 TABLET, FILM COATED ORAL EVERY 12 HOURS SCHEDULED
Status: DISCONTINUED | OUTPATIENT
Start: 2025-07-31 | End: 2025-07-31 | Stop reason: HOSPADM

## 2025-07-31 RX ADMIN — CLOPIDOGREL BISULFATE 75 MG: 75 TABLET, FILM COATED ORAL at 08:59

## 2025-07-31 RX ADMIN — COLCHICINE 0.6 MG: 0.6 TABLET ORAL at 08:59

## 2025-07-31 RX ADMIN — AMIODARONE HYDROCHLORIDE 200 MG: 200 TABLET ORAL at 08:59

## 2025-07-31 RX ADMIN — FLUOXETINE HYDROCHLORIDE 10 MG: 10 CAPSULE ORAL at 08:59

## 2025-07-31 RX ADMIN — MORPHINE SULFATE 1 MG: 2 INJECTION, SOLUTION INTRAMUSCULAR; INTRAVENOUS at 10:21

## 2025-07-31 RX ADMIN — METOPROLOL TARTRATE 25 MG: 25 TABLET, FILM COATED ORAL at 08:59

## 2025-07-31 RX ADMIN — ACETAMINOPHEN 650 MG: 325 TABLET ORAL at 09:03

## 2025-07-31 RX ADMIN — CEFEPIME 1000 MG: 1 INJECTION, POWDER, FOR SOLUTION INTRAMUSCULAR; INTRAVENOUS at 06:42

## 2025-07-31 RX ADMIN — ASPIRIN 650 MG: 325 TABLET, COATED ORAL at 09:00

## 2025-07-31 RX ADMIN — FAMOTIDINE 20 MG: 20 TABLET, FILM COATED ORAL at 08:59

## 2025-07-31 RX ADMIN — MORPHINE SULFATE 1 MG: 2 INJECTION, SOLUTION INTRAMUSCULAR; INTRAVENOUS at 05:14

## 2025-07-31 RX ADMIN — ROSUVASTATIN CALCIUM 20 MG: 20 TABLET, FILM COATED ORAL at 08:59

## 2025-07-31 RX ADMIN — ASPIRIN 650 MG: 325 TABLET, COATED ORAL at 12:29

## 2025-07-31 RX ADMIN — CEFEPIME 1000 MG: 1 INJECTION, POWDER, FOR SOLUTION INTRAMUSCULAR; INTRAVENOUS at 01:18

## 2025-07-31 NOTE — PLAN OF CARE
Goal Outcome Evaluation:      Patient discharging home with medications, monitor, discharge instructions.

## 2025-07-31 NOTE — DISCHARGE SUMMARY
[facility]  DISCHARGE SUMMARY    Patient Name: Wilmer Stover  : 1975  MRN: [id number]    Date of Admission: 2025  4:38 PM  Date of Discharge:  2025  Primary Care Physician: Mata Maher MD    Consults       Date and Time Order Name Status Description    2025  8:40 PM Inpatient Pulmonology Consult Completed     2025  8:40 PM Inpatient Cardiology Consult Completed     2025 11:00 PM Inpatient Cardiology Consult Completed             Hospital Course     Presenting Problem: Pericarditis    Active Hospital Problems    Diagnosis  POA    **Pericarditis [I31.9]  Yes    Atrial fibrillation with RVR [I48.91]  Yes    Pericardial effusion [I31.39]  Yes    Mass of upper lobe of right lung [R91.8]  Yes    Type 2 diabetes mellitus without complication, without long-term current use of insulin [E11.9]  Yes    Personal history of nicotine dependence [Z87.891]  Not Applicable    Nonrheumatic aortic valve insufficiency [I35.1]  Yes    Coronary artery disease involving native coronary artery of native heart [I25.10]  Yes    Essential hypertension [I10]  Yes    Hyperlipidemia LDL goal <50 [E78.5]  Yes    Hepatitis C [B19.20]  Yes      Resolved Hospital Problems   No resolved problems to display.          Hospital Course:  Wilmer Stover is a 50 y.o. male male history of Hodgkin's lymphoma (in remission for 13-14 years), CAD s/p recent cardiac stent placement, and diabetes, presenting with recurrent headache, dizziness, chest pain, and breathing difficulties. Found to have recurrent pericardititis.     Enlarging Pericardial Effusion  Recurrent Pericarditis  - repeated limited still showing 1-2cm moderate pericardial effusion, no evidence for tamponade  - Cardiology following, started on high dose ASA, 650mg 4xdaily x 1 week, then decrease to 650 3xdaily. Continue low dose colchicine. Plan f/u with Dr. Lama in 2 weeks  - pericardiocentesis  deferred at this time, no evidence for tamponade, symptoms improved     New Onset A.fib RVR:  - new 7/29  - HR initially in 160's, now back in NSR   - cardiology following, changed coreg to metoprolol for better rate control  - defering anticoagulation as he is on high dose ASA and plavix, planning to place 2 week holter monitor at discharge      RUL Pulmonary Mass  Hx Hodgkin's Lymphoma  - CT scan showing right apical opacity abutting the pleura with lobulated margins measuring 2.6 cm x 2.6 cm. Pulm consultation appreciated  - recommend outpatient PET and repeat CT imaging, if indicative of possible malignancy would proceed with biopsy with possible bronchoscopy. Ideally should remain on DAPT for at least 90 days per cardiology.  - outpatient referral to pulmonology clinic for further work-up/PET and possible bx.     Low Grade Fever   - no obvious infectious source  - CT chest w/out pneumonia, UA negative, resp PCR negative, MRSA PCR negative  - blood cultures negative  - given empiric cefepime x 3 days, however suspect that low grade fever may be related to  inflammatory pericarditis vs. Underlying undiagnosed malignany.     CAD s/p recent PCI  - continue DAPT  - continue statin, Beta-blocker     Elevated D-dimer:  - CTA negative for PE, Duplex negative for DVT  - d/c'd heparin     Tobacco Abuse:  - nicotine replacement  - cessation counseling     DMII:  - resume home regimen     HTN:  - continue current regimen     HLD:  - continue statin     Discharge Follow Up Recommendations for outpatient labs/diagnostics:  - f/u with Dr. Lama in 2 weeks  - outpatient referral to pulmonology for further work-up of lung mass  - PCP in 1 week    Day of Discharge     HPI:   Patient still having intermittent chest pain/pressure, denies SOA. No palpitations.    Review of Systems  Gen- No fevers, chills  CV- No chest pain, palpitations  Resp- No cough, dyspnea  GI- No N/V/D, abd pain      Vital Signs:   Temp:  [97.7 °F (36.5  °C)-98.8 °F (37.1 °C)] 98.5 °F (36.9 °C)  Heart Rate:  [71-90] 71  Resp:  [16-18] 18  BP: ()/(69-92) 112/78      Physical Exam:  Constitutional: No acute distress, awake, alert  HENT: NCAT, mucous membranes moist  Respiratory: Clear to auscultation bilaterally, respiratory effort normal   Cardiovascular: RRR, no murmurs, rubs, or gallops  Gastrointestinal: soft, nontender, nondistended  Musculoskeletal: No bilateral ankle edema  Psychiatric: Appropriate affect, cooperative  Neurologic: Oriented x 3, speech clear, no focal deficits  Skin: No rashes      Pertinent  and/or Most Recent Results     LAB RESULTS:      Lab 07/31/25  0434 07/29/25  2209 07/29/25  0815 07/28/25  0421 07/27/25  0418 07/26/25  2239 07/26/25  1705   WBC  --   --  9.68 11.74* 11.44*  --  16.52*   HEMOGLOBIN  --   --  12.6* 12.7* 12.4*  --  15.1   HEMATOCRIT  --   --  36.9* 38.2 37.8  --  45.0   PLATELETS  --   --  482* 478* 431  --  599*   NEUTROS ABS  --   --  7.07* 7.83* 7.95*  --  13.11*   IMMATURE GRANS (ABS)  --   --  0.06* 0.05 0.06*  --  0.06*   LYMPHS ABS  --   --  1.06 1.97 1.54  --  1.59   MONOS ABS  --   --  1.01* 1.49* 1.53*  --  1.57*   EOS ABS  --   --  0.40 0.33 0.30  --  0.12   MCV  --   --  86.4 87.8 87.9  --  86.5   SED RATE  --   --   --   --  51*  --   --    CRP 11.81*  --   --   --  18.30*  --   --    PROCALCITONIN  --  0.04  --   --  0.09  --  0.06   LACTATE  --  0.7  --   --  0.9 0.7  --    PROTIME  --   --   --   --  16.4*  --   --    APTT  --   --   --   --  36.3*  --   --    D DIMER QUANT  --   --   --   --   --   --  5.75*         Lab 07/29/25  0815 07/28/25  0421 07/27/25  0418 07/26/25  1809 07/26/25  1705   SODIUM 136 139 137  --  136   POTASSIUM 3.7 4.4 3.9  --  3.9   CHLORIDE 102 104 106  --  99   CO2 22.0 23.0 18.9*  --  23.5   ANION GAP 12.0 12.0 12.1  --  13.5   BUN 7.9 6.3 8.9  --  8.7   CREATININE 0.67* 0.88 0.75*  --  0.98   EGFR 113.7 104.8 109.9  --  93.9   GLUCOSE 123* 89 73  --  126*   CALCIUM 8.8  9.2 8.4*  --  9.8   MAGNESIUM 2.1  --  2.2 2.2  --    PHOSPHORUS  --   --  2.5 3.2  --    HEMOGLOBIN A1C  --   --  5.41  --   --          Lab 07/27/25  0418 07/26/25  1705   TOTAL PROTEIN 6.6 8.6*   ALBUMIN 3.3* 4.3   GLOBULIN 3.3 4.3   ALT (SGPT) 15 19   AST (SGOT) 20 24   BILIRUBIN 0.5 0.9   ALK PHOS 85 114   LIPASE  --  18         Lab 07/29/25  0919 07/29/25  0815 07/27/25  0418 07/26/25  2239 07/26/25  1809 07/26/25  1705   PROBNP  --   --   --   --  223.0 190.0   HSTROP T 7 <6 10 43* 10 9   PROTIME  --   --  16.4*  --   --   --    INR  --   --  1.25*  --   --   --          Lab 07/27/25  0418   CHOLESTEROL 95   LDL CHOL 56   HDL CHOL 25*   TRIGLYCERIDES 61             Lab 07/26/25  2358   PH, ARTERIAL 7.438   PCO2, ARTERIAL 35.2   PO2 ART 74.1*   FIO2 21   HCO3 ART 23.8   BASE EXCESS ART 0.0   CARBOXYHEMOGLOBIN 1.6     Brief Urine Lab Results  (Last result in the past 365 days)        Color   Clarity   Blood   Leuk Est   Nitrite   Protein   CREAT   Urine HCG        07/27/25 0101 [place]   Cloudy   Trace   Trace   Negative   30 mg/dL (1+)                 Microbiology Results (last 10 days)       Procedure Component Value - Date/Time    Blood Culture - Blood, Arm, Left [[id number]]  (Normal) Collected: 07/27/25 0651    Lab Status: Preliminary result Specimen: Blood from Arm, Left Updated: 07/31/25 0700     Blood Culture No growth at 4 days    Narrative:      Aerobic Bottle Only      Blood Culture - Blood, Arm, Right [[id number]]  (Normal) Collected: 07/27/25 0651    Lab Status: Preliminary result Specimen: Blood from Arm, Right Updated: 07/31/25 0700     Blood Culture No growth at 4 days    Narrative:      Aerobic Bottle Only      MRSA Screen, PCR (Inpatient) - Swab, Nares [[id number]]  (Normal) Collected: 07/26/25 2222    Lab Status: Final result Specimen: Swab from Nares Updated: 07/27/25 0753     MRSA PCR Negative    Narrative:      The negative predictive value of this diagnostic test is high and should only be  used to consider de-escalating anti-MRSA therapy. A positive result may indicate colonization with MRSA and must be correlated clinically.  MRSA Negative    COVID PRE-OP / PRE-PROCEDURE SCREENING ORDER (NO ISOLATION) - Swab, Nasopharynx [[id number]]  (Normal) Collected: 07/26/25 1721    Lab Status: Final result Specimen: Swab from Nasopharynx Updated: 07/26/25 1841    Narrative:      The following orders were created for panel order COVID PRE-OP / PRE-PROCEDURE SCREENING ORDER (NO ISOLATION) - Swab, Nasopharynx.  Procedure                               Abnormality         Status                     ---------                               -----------         ------                     Respiratory Panel PCR w/...[[id number]]  Normal              Final result                 Please view results for these tests on the individual orders.    Respiratory Panel PCR w/COVID-19(SARS-CoV-2) LIYAH/[facility]/DIANE/PAD/COR/REZA In-House, NP Swab in UTM/VTM, 2 HR TAT - Swab, Nasopharynx [[id number]]  (Normal) Collected: 07/26/25 1721    Lab Status: Final result Specimen: Swab from Nasopharynx Updated: 07/26/25 1841     ADENOVIRUS, PCR Not Detected     Coronavirus 229E Not Detected     Coronavirus HKU1 Not Detected     Coronavirus NL63 Not Detected     Coronavirus OC43 Not Detected     COVID19 Not Detected     Human Metapneumovirus Not Detected     Human Rhinovirus/Enterovirus Not Detected     Influenza A PCR Not Detected     Influenza B PCR Not Detected     Parainfluenza Virus 1 Not Detected     Parainfluenza Virus 2 Not Detected     Parainfluenza Virus 3 Not Detected     Parainfluenza Virus 4 Not Detected     RSV, PCR Not Detected     Bordetella pertussis pcr Not Detected     Bordetella parapertussis PCR Not Detected     Chlamydophila pneumoniae PCR Not Detected     Mycoplasma pneumo by PCR Not Detected    Narrative:      In the setting of a positive respiratory panel with a viral infection PLUS a negative procalcitonin without other  underlying concern for bacterial infection, consider observing off antibiotics or discontinuation of antibiotics and continue supportive care. If the respiratory panel is positive for atypical bacterial infection (Bordetella pertussis, Chlamydophila pneumoniae, or Mycoplasma pneumoniae), consider antibiotic de-escalation to target atypical bacterial infection.    Blood Culture - Blood, Arm, Left [[id number]]  (Normal) Collected: 07/26/25 1705    Lab Status: Preliminary result Specimen: Blood from Arm, Left Updated: 07/30/25 1745     Blood Culture No growth at 4 days    Blood Culture - Blood, Arm, Right [[id number]]  (Normal) Collected: 07/26/25 1705    Lab Status: Preliminary result Specimen: Blood from Arm, Right Updated: 07/30/25 1745     Blood Culture No growth at 4 days            XR Chest 1 View  Result Date: 7/29/2025  XR CHEST 1 VW Date of Exam: 7/29/2025 8:06 AM EDT Indication: Chest pain Comparison: 7/26/2025. Findings: The heart is enlarged. There are bandlike densities in the medial right lung base and in the left lung base probably attributed atelectasis especially when compared to the CT angiogram of the chest from 7/26/2025. There are no layering pleural effusions.  There is a subtle mass in the right apex which was noted on the CT angiogram of the chest from 7/26/2025 concerning for malignancy. A PET/CT exam was recommended for follow-up. The lungs are otherwise clear. The pulmonary vascular markings are normal. The bony thorax is normal for age.     Impression: 1.Cardiomegaly. 2.Bandlike densities in the medial right lung base and in the left lung base probably attributed to atelectasis. 3.Subtle mass in the right apex which was noted on the CT angiogram of the chest from 7/26/2025 concerning for malignancy. Electronically Signed: Noe Mcclure MD  7/29/2025 8:45 AM EDT  Workstation ID: [id number]    Duplex Venous Lower Extremity - Bilateral CAR  Result Date: 7/27/2025    Normal bilateral lower extremity  venous duplex scan.     XR Abdomen KUB  Result Date: 7/27/2025  XR ABDOMEN KUB Date of Exam: 7/26/2025 8:56 PM EDT Indication: r/o free air Comparison: Same day CT chest Findings: There is no evidence of bowel obstruction. Nonspecific bowel gas pattern. No evidence of free intraperitoneal gas on this single upright image. Included lung bases show trace left pleural effusion and left lung atelectasis. There is contrast within the collecting system from same day CT chest. No acute bone abnormality..     Impression: No evidence of bowel obstruction or free intraperitoneal gas. Electronically Signed: Vlad Baxter MD  7/27/2025 8:29 AM EDT  Workstation ID: [id number]    CT Angiogram Chest Pulmonary Embolism  Result Date: 7/26/2025  CT ANGIOGRAM CHEST PULMONARY EMBOLISM Date of Exam: 7/26/2025 6:35 PM EDT Indication: Pulmonary Embolism. Comparison: None available. Technique: Axial CT images were obtained of the chest after the uneventful intravenous administration of 75 cc Isovue-370 IV contrast utilizing pulmonary embolism protocol.  In addition, a 3-D volume rendered image was created for interpretation.  Reconstructed coronal and sagittal images were also obtained. Automated exposure control and iterative construction methods were used. Findings: There is a right apical opacity abutting the pleura with lobulated margins measuring 2.6 cm x 2.6 cm concerning for a mass. Normal atelectatic changes. Left upper lobe bandlike atelectasis. Large pericardial effusion with trace bilateral pleural fluid. No pneumothorax. No evidence of aortic dissection. No pulmonary embolus. Atheromatous disease of the coronary vessels. No mediastinal, perihilar, or axillary adenopathy. Thoracic vertebral body height and alignment are normal. The sternum is intact. No rib fractures are seen.     1. Large pericardial effusion. No pulmonary embolus. 2. Lobulated right apical opacity during 2.6 cm x 2.6 cm concerning for a possible mass. PET/CT  and/or biopsy recommended. Electronically Signed: Kane Villeda MD  7/26/2025 7:30 PM EDT  Workstation ID: [id number]    XR Chest 1 View  Result Date: 7/26/2025  XR CHEST 1 VW Date of Exam: 7/26/2025 3:30 PM EDT Indication: Chest Pain Triage Protocol Comparison: Chest radiograph dated 7/11/2025 Findings: There is bandlike atelectasis within the left midlung. There is streaky left basilar airspace opacity which is new from prior examination. This could represent atelectasis or pneumonia. The right lung is clear. There is no pleural effusion or pneumothorax. There are degenerative changes of the thoracic spine.     Impression: 1.Streaky left basilar airspace opacity which could represent atelectasis or pneumonia. 2.Bandlike atelectasis within the left midlung. Electronically Signed: Padilla Reyes MD  7/26/2025 3:59 PM EDT  Workstation ID: [id number]      Results for orders placed during the hospital encounter of 07/26/25    Duplex Venous Lower Extremity - Bilateral CAR 07/27/2025 12:14 PM    Interpretation Summary    Normal bilateral lower extremity venous duplex scan.      Results for orders placed during the hospital encounter of 07/26/25    Duplex Venous Lower Extremity - Bilateral CAR 07/27/2025 12:14 PM    Interpretation Summary    Normal bilateral lower extremity venous duplex scan.      Results for orders placed during the hospital encounter of 07/26/25    Adult Transthoracic Echo Limited W/ Cont if Necessary Per Protocol 07/29/2025 10:48 AM    Interpretation Summary    There is a moderate (1-2cm) pericardial effusion. There is no evidence of cardiac tamponade.      Plan for Follow-up of Pending Labs/Results:   Pending Labs       Order Current Status    Blood Culture - Blood, Arm, Left Preliminary result    Blood Culture - Blood, Arm, Left Preliminary result    Blood Culture - Blood, Arm, Right Preliminary result    Blood Culture - Blood, Arm, Right Preliminary result          Discharge Details        Discharge  Medications        New Medications        Instructions Start Date   amiodarone 200 MG tablet  Commonly known as: PACERONE   200 mg, Oral, Every 24 Hours Scheduled      aspirin 325 MG EC tablet  Replaces: Aspirin Low Dose 81 MG chewable tablet   650 mg, Oral, 4 Times Daily, Take 2 tablets 4 times a day for 6 days then decrease to 2 tablets 3 times      metoprolol tartrate 25 MG tablet  Commonly known as: LOPRESSOR   25 mg, Oral, Every 12 Hours Scheduled             Continue These Medications        Instructions Start Date   albuterol sulfate  (90 Base) MCG/ACT inhaler  Commonly known as: PROVENTIL HFA;VENTOLIN HFA;PROAIR HFA   2 puffs, Inhalation, Every 4 Hours PRN      clopidogrel 75 MG tablet  Commonly known as: PLAVIX   75 mg, Oral, Daily      colchicine 0.6 MG tablet   0.6 mg, Oral, Daily      famotidine 20 MG tablet  Commonly known as: PEPCID   20 mg, Oral, 2 Times Daily      FLUoxetine 10 MG capsule  Commonly known as: PROzac   10 mg, Oral, Daily      nitroglycerin 0.4 MG SL tablet  Commonly known as: NITROSTAT   0.4 mg, Sublingual, Every 5 Minutes PRN, Take no more than 3 doses in 15 minutes.      rosuvastatin 20 MG tablet  Commonly known as: CRESTOR   20 mg, Oral, Daily      traZODone 100 MG tablet  Commonly known as: DESYREL   100 mg, Oral, Every Night at Bedtime             Stop These Medications      amLODIPine 10 MG tablet  Commonly known as: NORVASC     Aspirin Low Dose 81 MG chewable tablet  Generic drug: aspirin  Replaced by: aspirin 325 MG EC tablet     carvedilol 3.125 MG tablet  Commonly known as: COREG     losartan 25 MG tablet  Commonly known as: COZAAR     predniSONE 20 MG tablet  Commonly known as: DELTASONE              Allergies   Allergen Reactions    Penicillins Hives and Unknown (See Comments)         Discharge Disposition:  Home or Self Care    Diet:  Hospital:  Diet Order   Procedures    Diet: Cardiac; Healthy Heart (2-3 Na+); Fluid Consistency: Thin (IDDSI 0)             Activity:  - as tolerated    Restrictions or Other Recommendations:  - none       CODE STATUS:    Code Status and Medical Interventions: CPR (Attempt to Resuscitate); Full Support   Ordered at: 07/26/25 4758     Code Status (Patient has no pulse and is not breathing):    CPR (Attempt to Resuscitate)     Medical Interventions (Patient has pulse or is breathing):    Full Support     Level Of Support Discussed With:    Patient       Future Appointments   Date Time Provider Department Center   8/12/2025  1:00 PM [facility]   8/15/2025  1:00 PM Mata Maher MD [facility]   8/20/2025  1:15 PM Slick Lama IV, MD [facility]                 Ana Mcfarlane,   07/31/25      Time Spent on Discharge:  I spent  40  minutes on this discharge activity which included: face-to-face encounter with the patient, reviewing the data in the system, coordination of the care with the nursing staff as well as consultants, documentation, and entering orders.

## 2025-07-31 NOTE — OUTREACH NOTE
Prep Survey      Flowsheet Row Responses   [facility]thod[facility] facility patient discharged from? [place]   Is LACE score < 7 ? No   Eligibility [facility]   Date of Admission 07/26/25   Date of Discharge 07/31/25   Discharge Disposition Home or Self Care   Discharge diagnosis Pericarditis   Does the patient have one of the following disease processes/diagnoses(primary or secondary)? Other   Does the patient have Home health ordered? No   Is there a DME ordered? No   Prep survey completed? Yes            KIM KERR - Registered Nurse

## 2025-07-31 NOTE — PROGRESS NOTES
Cardiology Progress Note      Reason for visit:    Pericarditis  New onset atrial fibrillation with RVR  Coronary artery disease with recent PCI    IDENTIFICATION: 50-year-old gentleman who resides in [facility]    Diagnosis  POA    **Pericarditis [I31.9]  Yes     Priority: High    Type 2 diabetes mellitus without complication, without long-term current use of insulin [E11.9]  Yes     Priority: High    Coronary artery disease involving native coronary artery of native heart [I25.10]  Yes     Priority: High     Cardiac catheterization for NSTEMI (7/12/2025): Severe 2-vessel CAD (proximal RCA, OM1).  Proximal RCA culprit for NSTEMI status post 4 x 23 mm LETICIA.  Recommend medical therapy for OM1 disease and as well as moderate diffuse multivessel disease.  Mildly elevated LV filling pressure (LVEDP 17 mmHg)      Mass of upper lobe of right lung [R91.8]  Yes     Priority: Medium    Essential hypertension [I10]  Yes     Priority: Medium     Target blood pressure <130/80 mmHg      Personal history of nicotine dependence [Z87.891]  Not Applicable     Priority: Low    Nonrheumatic aortic valve insufficiency [I35.1]  Yes     Priority: Low     Echo (7/13/2025): Normal LVEF.  Mild to moderate pericardial effusion with no tamponade.  Moderate aortic regurgitation present.      Hyperlipidemia LDL goal <50 [E78.5]  Yes     Priority: Low    Hepatitis C [B19.20]  Yes     Priority: Low    Atrial fibrillation with RVR [I48.91]  Yes    Pericardial effusion [I31.39]  Yes     Echo (7/27/2025): LVEF 56 to 60%.  Pericardial effusion 1-2 cm without tamponade.  Compared to prior echo 7/13 pericardial effusion is worsened              Patient lying in bed sleeping but arouses easily.  He has some occasional intermittent discomfort in his chest but it has greatly improved since admission.  He has remained hemodynamically stable without further atrial fibrillation.           Vital Sign Min/Max for last 24  hours  Temp  Min: 97.7 °F (36.5 °C)  Max: 98.8 °F (37.1 °C)   BP  Min: 98/69  Max: 131/92   Pulse  Min: 73  Max: 91   Resp  Min: 16  Max: 18   SpO2  Min: 94 %  Max: 97 %   No data recorded      Intake/Output Summary (Last 24 hours) at 7/31/2025 0743  Last data filed at 7/31/2025 0030  Gross per 24 hour   Intake 1440 ml   Output --   Net 1440 ml           Physical Exam  Constitutional:       Appearance: He is well-developed.   HENT:      Head: Normocephalic.   Neck:      Vascular: No carotid bruit or JVD.   Cardiovascular:      Rate and Rhythm: Normal rate and regular rhythm.      Heart sounds: Normal heart sounds. No murmur heard.     No friction rub. No gallop.   Pulmonary:      Effort: Pulmonary effort is normal.      Breath sounds: Normal breath sounds.   Abdominal:      General: Bowel sounds are normal. There is no distension.      Palpations: Abdomen is soft.   Skin:     General: Skin is warm and dry.   Neurological:      Mental Status: He is alert and oriented to person, place, and time.         Tele: Normal sinus rhythm    Results Review (reviewed the patient's recent labs in the electronic medical record):     EKG (7/31/2025): Normal sinus rhythm    CXR (7/29/2025): Cardiomegaly.  Subtle mass in right apex noted on CT angiogram of the chest concerning for malignancy CT angiogram of the chest (7/26/2025): Large pericardial effusion.  No pulmonary embolus.  Lobulated right apical opacity 2.6 x 2.6 cm concern of    CT angiogram of the chest (7/26/2025): Large pericardial effusion.  No PE.  Lobulated right apical opacity 2.6 cm x 2.6 cm concerning for possible mass.  PET/CT and/or biopsy recommended    Echo (7/29/2025): Moderate 1-2 cm pericardial effusion no tamponade    Results from last 7 days   Lab Units 07/29/25  0815 07/28/25  0421 07/27/25  0418 07/26/25  1809 07/26/25  1705   SODIUM mmol/L 136 139 137  --  136   POTASSIUM mmol/L 3.7 4.4 3.9  --  3.9   CHLORIDE mmol/L 102 104 106  --  99   BUN mg/dL 7.9  6.3 8.9  --  8.7   CREATININE mg/dL 0.67* 0.88 0.75*  --  0.98   MAGNESIUM mg/dL 2.1  --  2.2 2.2  --        Results from last 7 days   Lab Units 07/29/25  0919 07/29/25  0815 07/27/25  0418   HSTROP T ng/L 7 <6 10       Results from last 7 days   Lab Units 07/29/25  0815 07/28/25  0421 07/27/25  0418   WBC 10*3/mm3 9.68 11.74* 11.44*   HEMOGLOBIN g/dL 12.6* 12.7* 12.4*   HEMATOCRIT % 36.9* 38.2 37.8   PLATELETS 10*3/mm3 482* 478* 431       Lab Results   Component Value Date    HGBA1C 5.41 07/27/2025       Lab Results   Component Value Date    CHOL 95 07/27/2025    TRIG 61 07/27/2025    HDL 25 (L) 07/27/2025    LDL 56 07/27/2025                Afib RVR  New onset 7/29   IV Amio Bolus and gtt protocol --> Converted to NSR   Coreg 3.125 BID  Repeat STAT ECHO showed no change in pericardial effusion (1-2 cm) without evidence of tamponade.   IV amiodarone transition to p.o. 200 mg daily     Recurrent pericarditis/pericardial effusion  Acute pericarditis 7/11 secondary to non-STEMI s/p PCI on 7/13  CRP/ESR elevated, low-grade fever with no signs of infection, elevated          D-dimer with CTA negative for PE and duplex negative for DVT  Limited echo 7/26 moderate pericardial effusion without tamponade, at 1-2 cm   ASA increased to 650 4x/day x7 days with taper down   Decreased colchicine 0.6 mg to daily dosing  C-reactive protein elevated but decreasing    CAD  Recent PCI on 7/13/2025  EF 56-60%  DAPT with aspirin and Plavix    Essential hypertension  111/71  Coreg 3.125 BID  Current /68    RUL pulmonary mass  Incidental finding on CT scan 7/26 showing right apical lobulated density measuring 2.6 cm x 2.6 cm.  Pulmonology consulted  Per pulmonology ideally a biopsy, either a robotic bronchoscopy or a transthoracic needle aspiration given the location.  However, he is on Plavix for LETICIA placed on 7/11.  Would be difficult to hold prior to 3 months of therapy due to risk of in-stent thrombosis.  A reasonable approach  may be to schedule a PET scan as an outpatient and then a follow-up CT scan and consider holding the Plavix after 90 days if cardiology agrees and proceed with a biopsy if the scans are indicative of a potentially malignant process.    Type 2 diabetes mellitus  Management per hospitalist    Hyperlipidemia  Crestor 20 mg daily    Tobacco abuse  Nicotine patch               Change carvedilol to metoprolol tartrate 25 mg twice daily, preferred for management of atrial fibrillation  Okay for discharge home from our standpoint.  Will Place 2-week monitor at discharge to screen for further atrial fibrillation.  Given high dose of aspirin along with Plavix will defer anticoagulation for now unless having repeat episodes of A-fib on monitor  Home on low-dose colchicine 0.6 mg daily  Aspirin 650 mg 4 times daily for 1 week then decrease to 3 times daily  Immediate smoking cessation  Will need outpatient PET scan to assess mass noted.  Will likely require future biopsy pending results.  Will schedule follow-up with Dr. Lama in the office in 2 weeks.    Electronically signed by LELA Lopez, 07/31/25, 7:43 AM EDT.

## 2025-07-31 NOTE — CASE MANAGEMENT/SOCIAL WORK
Case Management Discharge Note      Final Note: Mr Stover is up for discharge today.  I spoke with him at bedside.  At this time he denies any discharge needs, and states that someone is coming to transport him home.         Selected Continued Care - Admitted Since 7/26/2025       Destination    No services have been selected for the patient.                Durable Medical Equipment    No services have been selected for the patient.                Dialysis/Infusion    No services have been selected for the patient.                Home Medical Care    No services have been selected for the patient.                Therapy    No services have been selected for the patient.                Community Resources    No services have been selected for the patient.                Community & DME    No services have been selected for the patient.                         Final Discharge Disposition Code: 01 - home or self-care

## 2025-07-31 NOTE — PLAN OF CARE
Goal Outcome Evaluation:  Plan of Care Reviewed With: patient        Progress: improving  Outcome Evaluation: PT is on room air and NSR on tele. the patient still having the lower back and chest pain and using morphine for treatment. PT has been afebrile this shift

## 2025-08-01 ENCOUNTER — TRANSITIONAL CARE MANAGEMENT TELEPHONE ENCOUNTER (OUTPATIENT)
Dept: CALL CENTER | Facility: HOSPITAL | Age: 50
End: 2025-08-01
Payer: MEDICAID

## 2025-08-01 ENCOUNTER — TELEPHONE (OUTPATIENT)
Dept: CARDIOLOGY | Facility: CLINIC | Age: 50
End: 2025-08-01
Payer: MEDICAID

## 2025-08-01 DIAGNOSIS — R11.0 NAUSEA: Primary | ICD-10-CM

## 2025-08-01 LAB
BACTERIA SPEC AEROBE CULT: NORMAL
BACTERIA SPEC AEROBE CULT: NORMAL

## 2025-08-01 NOTE — OUTREACH NOTE
Call Center TCM Note      Flowsheet Row Responses   [facility] patient discharged from? [place]   Does the patient have one of the following disease processes/diagnoses(primary or secondary)? Other   TCM attempt successful? No   Unsuccessful attempts Attempt 1            JORGE LUIS BAKER - Registered Nurse    8/1/2025, 11:04 EDT

## 2025-08-01 NOTE — OUTREACH NOTE
Call Center TCM Note      Flowsheet Row Responses   [facility] patient discharged from? [place]   Does the patient have one of the following disease processes/diagnoses(primary or secondary)? Other   TCM attempt successful? Yes   Call start time 1436   Call end time 1443   Discharge diagnosis Enlarging Pericardial Effusion  Recurrent Pericarditis, New Onset A.fib RVR, RUL Pulmonary Mass  Hx Hodgkin's Lymphoma   Person spoke with today (if not patient) and relationship patient   Meds reviewed with patient/caregiver? Yes   Does the patient have all medications ordered at discharge? Yes   Prescription comments Patient reports that he needs nausea medication ordered from PCP and wants to know if OK to take OTC immodium for diarrhea. Message routed to office.   Is the patient taking all medications as directed (includes completed medication regime)? Yes   Comments PCP DR Maher. Hospital follow up appt in place for 8/6  11am with Dr Reyes.   Does the patient have an appointment with their PCP within 7-14 days of discharge? Yes   Has home health visited the patient within 72 hours of discharge? N/A   Psychosocial issues? No   Did the patient receive a copy of their discharge instructions? Yes   Nursing interventions Reviewed instructions with patient   What is the patient's perception of their health status since discharge? New symptoms unrelated to diagnosis  [Reports difficult to eat from nausea/poor appetite and having diarrhea.]   Is the patient/caregiver able to teach back signs and symptoms related to disease process for when to call PCP? Yes   Is the patient/caregiver able to teach back signs and symptoms related to disease process for when to call 911? Yes   Is the patient/caregiver able to teach back the hierarchy of who to call/visit for symptoms/problems? PCP, Specialist, Home health nurse, Urgent Care, ED, 911 Yes   TCM call completed? Yes   Wrap up additional comments Echo 8/12, Cardiology 8/12   eKlby   Call end time 1443   Would this patient benefit from a Referral to [facility] Social Work? No   Is the patient interested in additional calls from an ambulatory ? No            JORGE LUIS BAKER - Registered Nurse    8/1/2025, 14:47 EDT

## 2025-08-02 LAB
QT INTERVAL: 308 MS
QT INTERVAL: 342 MS
QTC INTERVAL: 435 MS
QTC INTERVAL: 438 MS

## 2025-08-03 LAB
QT INTERVAL: 410 MS
QT INTERVAL: 424 MS
QTC INTERVAL: 476 MS
QTC INTERVAL: 477 MS

## 2025-08-04 RX ORDER — ONDANSETRON 4 MG/1
4 TABLET, FILM COATED ORAL EVERY 8 HOURS PRN
COMMUNITY
End: 2025-08-04 | Stop reason: SDUPTHER

## 2025-08-04 RX ORDER — ONDANSETRON 4 MG/1
4 TABLET, FILM COATED ORAL EVERY 8 HOURS PRN
Qty: 12 TABLET | Refills: 2 | Status: SHIPPED | OUTPATIENT
Start: 2025-08-04

## 2025-08-06 ENCOUNTER — OFFICE VISIT (OUTPATIENT)
Dept: INTERNAL MEDICINE | Facility: CLINIC | Age: 50
End: 2025-08-06
Payer: MEDICAID

## 2025-08-06 VITALS
DIASTOLIC BLOOD PRESSURE: 76 MMHG | OXYGEN SATURATION: 97 % | HEART RATE: 70 BPM | SYSTOLIC BLOOD PRESSURE: 110 MMHG | TEMPERATURE: 97.7 F | WEIGHT: 174 LBS | HEIGHT: 66 IN | BODY MASS INDEX: 27.97 KG/M2

## 2025-08-06 DIAGNOSIS — R91.8 MASS OF UPPER LOBE OF RIGHT LUNG: ICD-10-CM

## 2025-08-06 DIAGNOSIS — I31.39 PERICARDIAL EFFUSION: ICD-10-CM

## 2025-08-06 DIAGNOSIS — F17.210 CIGARETTE SMOKER MOTIVATED TO QUIT: Primary | ICD-10-CM

## 2025-08-11 PROBLEM — I31.9 PERICARDITIS: Status: RESOLVED | Noted: 2025-07-26 | Resolved: 2025-08-11

## 2025-08-12 ENCOUNTER — HOSPITAL ENCOUNTER (OUTPATIENT)
Dept: CARDIOLOGY | Facility: HOSPITAL | Age: 50
Discharge: HOME | End: 2025-08-12
Admitting: INTERNAL MEDICINE
Payer: MEDICAID

## 2025-08-12 VITALS
WEIGHT: 174 LBS | SYSTOLIC BLOOD PRESSURE: 95 MMHG | DIASTOLIC BLOOD PRESSURE: 73 MMHG | HEIGHT: 66 IN | BODY MASS INDEX: 27.97 KG/M2

## 2025-08-12 DIAGNOSIS — I35.1 NONRHEUMATIC AORTIC VALVE INSUFFICIENCY: ICD-10-CM

## 2025-08-12 DIAGNOSIS — I30.0 ACUTE IDIOPATHIC PERICARDITIS: ICD-10-CM

## 2025-08-12 DIAGNOSIS — I31.39 PERICARDIAL EFFUSION: ICD-10-CM

## 2025-08-12 LAB
BH CV VAS BP LEFT ARM: NORMAL MMHG
LV EF 2D ECHO EST: 60 %

## 2025-08-12 PROCEDURE — 93308 TTE F-UP OR LMTD: CPT

## 2025-08-12 PROCEDURE — 93321 DOPPLER ECHO F-UP/LMTD STD: CPT

## 2025-08-12 PROCEDURE — 93325 DOPPLER ECHO COLOR FLOW MAPG: CPT

## 2025-08-13 ENCOUNTER — RESULTS FOLLOW-UP (OUTPATIENT)
Dept: TELEMETRY | Facility: HOSPITAL | Age: 50
End: 2025-08-13
Payer: MEDICAID

## 2025-08-13 PROBLEM — I48.0 PAROXYSMAL ATRIAL FIBRILLATION: Status: ACTIVE | Noted: 2025-07-29

## 2025-08-14 ENCOUNTER — TELEPHONE (OUTPATIENT)
Dept: PULMONOLOGY | Facility: CLINIC | Age: 50
End: 2025-08-14
Payer: MEDICAID

## 2025-08-20 ENCOUNTER — OFFICE VISIT (OUTPATIENT)
Dept: CARDIOLOGY | Facility: CLINIC | Age: 50
End: 2025-08-20
Payer: MEDICAID

## 2025-08-20 ENCOUNTER — PATIENT ROUNDING (BHMG ONLY) (OUTPATIENT)
Dept: CARDIOLOGY | Facility: CLINIC | Age: 50
End: 2025-08-20
Payer: MEDICAID

## 2025-08-20 VITALS
HEART RATE: 66 BPM | WEIGHT: 178 LBS | SYSTOLIC BLOOD PRESSURE: 102 MMHG | HEIGHT: 67 IN | OXYGEN SATURATION: 96 % | BODY MASS INDEX: 27.94 KG/M2 | DIASTOLIC BLOOD PRESSURE: 80 MMHG

## 2025-08-20 DIAGNOSIS — I25.119 CORONARY ARTERY DISEASE INVOLVING NATIVE CORONARY ARTERY OF NATIVE HEART WITH ANGINA PECTORIS: Primary | ICD-10-CM

## 2025-08-20 DIAGNOSIS — I31.39 PERICARDIAL EFFUSION: ICD-10-CM

## 2025-08-20 DIAGNOSIS — I35.1 NONRHEUMATIC AORTIC VALVE INSUFFICIENCY: ICD-10-CM

## 2025-08-20 DIAGNOSIS — R91.8 MASS OF UPPER LOBE OF RIGHT LUNG: ICD-10-CM

## 2025-08-20 DIAGNOSIS — I48.0 PAROXYSMAL ATRIAL FIBRILLATION: ICD-10-CM

## 2025-08-20 DIAGNOSIS — I30.0 ACUTE IDIOPATHIC PERICARDITIS: ICD-10-CM

## 2025-08-20 DIAGNOSIS — E78.5 HYPERLIPIDEMIA LDL GOAL <50: ICD-10-CM

## 2025-08-20 PROBLEM — I21.4 NSTEMI (NON-ST ELEVATED MYOCARDIAL INFARCTION): Status: RESOLVED | Noted: 2025-07-11 | Resolved: 2025-08-20

## 2025-08-20 PROBLEM — R65.20 SEPSIS WITH ACUTE ORGAN DYSFUNCTION: Status: RESOLVED | Noted: 2025-07-26 | Resolved: 2025-08-20

## 2025-08-20 PROBLEM — I10 ESSENTIAL HYPERTENSION: Chronic | Status: RESOLVED | Noted: 2025-07-12 | Resolved: 2025-08-20

## 2025-08-20 PROBLEM — A41.9 SEPSIS WITH ACUTE ORGAN DYSFUNCTION: Status: RESOLVED | Noted: 2025-07-26 | Resolved: 2025-08-20

## 2025-08-20 PROCEDURE — 1160F RVW MEDS BY RX/DR IN RCRD: CPT | Performed by: INTERNAL MEDICINE

## 2025-08-20 PROCEDURE — 1159F MED LIST DOCD IN RCRD: CPT | Performed by: INTERNAL MEDICINE

## 2025-08-20 PROCEDURE — 99214 OFFICE O/P EST MOD 30 MIN: CPT | Performed by: INTERNAL MEDICINE

## 2025-08-20 RX ORDER — AMIODARONE HYDROCHLORIDE 200 MG/1
200 TABLET ORAL DAILY
Qty: 90 TABLET | Refills: 0 | Status: SHIPPED | OUTPATIENT
Start: 2025-08-20 | End: 2025-11-18

## 2025-08-20 RX ORDER — COLCHICINE 0.6 MG/1
0.6 TABLET ORAL DAILY
Qty: 90 TABLET | Refills: 0 | Status: SHIPPED | OUTPATIENT
Start: 2025-08-20 | End: 2025-11-18

## 2025-08-20 RX ORDER — NITROGLYCERIN 0.4 MG/1
0.4 TABLET SUBLINGUAL
Qty: 25 TABLET | Refills: 5 | Status: SHIPPED | OUTPATIENT
Start: 2025-08-20

## 2025-08-20 RX ORDER — ROSUVASTATIN CALCIUM 20 MG/1
20 TABLET, COATED ORAL DAILY
Qty: 90 TABLET | Refills: 3 | Status: SHIPPED | OUTPATIENT
Start: 2025-08-20

## 2025-08-20 RX ORDER — ASPIRIN 81 MG/1
81 TABLET ORAL DAILY
Qty: 90 TABLET | Refills: 3 | Status: SHIPPED | OUTPATIENT
Start: 2025-08-20

## 2025-08-20 RX ORDER — CLOPIDOGREL BISULFATE 75 MG/1
75 TABLET ORAL DAILY
Qty: 90 TABLET | Refills: 3 | Status: SHIPPED | OUTPATIENT
Start: 2025-08-20

## 2025-08-20 RX ORDER — METOPROLOL TARTRATE 25 MG/1
25 TABLET, FILM COATED ORAL EVERY 12 HOURS SCHEDULED
Qty: 180 TABLET | Refills: 1 | Status: SHIPPED | OUTPATIENT
Start: 2025-08-20

## 2025-08-21 LAB
CV ZIO AF AVG BPM: 105 BPM
CV ZIO AF BPM HIGH: 187 BPM
CV ZIO AF BPM LOW: 78 BPM
CV ZIO AF F EPI AVG BPM: 155 BPM
CV ZIO AF F EPI BPM HIGH: 187 BPM
CV ZIO AF F EPI BPM LOW: 129 BPM
CV ZIO AF F EPI DT: NORMAL
CV ZIO AF L EPI AVG BPM: 108 BPM
CV ZIO AF L EPI BPM HIGH: 137 BPM
CV ZIO AF L EPI BPM LOW: 86 BPM
CV ZIO AF L EPI DUR: 226.3 SEC
CV ZIO AF L EPI END: NORMAL
CV ZIO AF L EPI START: NORMAL
CV ZIO AF PERCENT: 1 %
CV ZIO AF S EPI AVG BPM: 95 BPM
CV ZIO AF S EPI BPM HIGH: 113 BPM
CV ZIO AF S EPI BPM LOW: 78 BPM
CV ZIO AF S EPI DT: NORMAL
CV ZIO BASELINE AVG BPM: 70 BPM
CV ZIO BASELINE BPM HIGH: 187 BPM
CV ZIO BASELINE BPM LOW: 44 BPM
CV ZIO DEVICE ANALYSIS TIME: NORMAL
CV ZIO ECT SVE COUNT: 564 EPISODES
CV ZIO ECT SVE CPLT COUNT: 190 EPISODES
CV ZIO ECT SVE CPLT FREQ: NORMAL
CV ZIO ECT SVE FREQ: NORMAL
CV ZIO ECT SVE TPLT COUNT: 45 EPISODES
CV ZIO ECT SVE TPLT FREQ: NORMAL
CV ZIO ECT VE COUNT: 1281 EPISODES
CV ZIO ECT VE CPLT COUNT: 136 EPISODES
CV ZIO ECT VE CPLT FREQ: NORMAL
CV ZIO ECT VE FREQ: NORMAL
CV ZIO ECT VE TPLT COUNT: 0 EPISODES
CV ZIO ECT VE TPLT FREQ: 0
CV ZIO ECTOPIC SVE COUPLET RAW PERCENT: 0.03 %
CV ZIO ECTOPIC SVE ISOLATED PERCENT: 0.04 %
CV ZIO ECTOPIC SVE TRIPLET RAW PERCENT: 0.01 %
CV ZIO ECTOPIC VE COUPLET RAW PERCENT: 0.02 %
CV ZIO ECTOPIC VE ISOLATED PERCENT: 0.1 %
CV ZIO ECTOPIC VE TRIPLET RAW PERCENT: 0 %
CV ZIO ENROLLMENT END: NORMAL
CV ZIO ENROLLMENT START: NORMAL
CV ZIO IRREG TYPE: NORMAL
CV ZIO L BIGEMINY DUR: 5.1 SEC
CV ZIO L BIGEMINY END: NORMAL
CV ZIO L BIGEMINY START: NORMAL
CV ZIO PATIENT EVENTS DIARIES: 0
CV ZIO PATIENT EVENTS TRIGGERS: 0
CV ZIO PAUSE COUNT: 0
CV ZIO PRESCRIPTION STATUS: NORMAL
CV ZIO SVT COUNT: 0
CV ZIO TOTAL  ENROLLMENT PERIOD: NORMAL
CV ZIO VT COUNT: 0

## (undated) DEVICE — NDL ANGIOGR ADV THN SMOTH SGLWALL 21G 1.5

## (undated) DEVICE — ST EXT IV SMRTSTE 2VLV FIX M LL 6ML 41

## (undated) DEVICE — GUIDE CATHETER: Brand: MACH1™

## (undated) DEVICE — ADULT, W/LG. BACK PAD, RADIOTRANSPARENT ELEMENT AND LEAD WIRE COMPATIBLE W/: Brand: DEFIBRILLATION ELECTRODES

## (undated) DEVICE — CATH DIAG EXPO M/ PK 6FR FL4/FR4 PIG 3PK

## (undated) DEVICE — CATH DIAG IMPULSE FL3.5 6F 100CM

## (undated) DEVICE — COPILOT BLEEDBACK CONTROL VALVE: Brand: COPILOT

## (undated) DEVICE — Device: Brand: ASAHI SION BLUE

## (undated) DEVICE — ST INF PRI SMRTSTE 20DRP 2VLV 24ML 117

## (undated) DEVICE — MODEL AT P65, P/N 701554-001KIT CONTENTS: HAND CONTROLLER, 3-WAY HIGH-PRESSURE STOPCOCK WITH ROTATING END AND PREMIUM HIGH-PRESSURE TUBING: Brand: ANGIOTOUCH® KIT

## (undated) DEVICE — TR BAND RADIAL ARTERY COMPRESSION DEVICE: Brand: TR BAND

## (undated) DEVICE — GLIDESHEATH BASIC HYDROPHILIC COATED INTRODUCER SHEATH: Brand: GLIDESHEATH

## (undated) DEVICE — MODEL BT2000 P/N 700287-012KIT CONTENTS: MANIFOLD WITH SALINE AND CONTRAST PORTS, SALINE TUBING WITH SPIKE AND HAND SYRINGE, TRANSDUCER: Brand: BT2000 AUTOMATED MANIFOLD KIT

## (undated) DEVICE — GW PERIPH GUIDERIGHT STD/EXCHNG/J/TIP SS 0.035IN 5X260CM

## (undated) DEVICE — NC TREK NEO™ CORONARY DILATATION CATHETER 4.00 MM X 15 MM / RAPID-EXCHANGE: Brand: NC TREK NEO™

## (undated) DEVICE — KT CATH IMG DRAGONFLY/OPSTAR 2.7F 135CM

## (undated) DEVICE — CVR PROB ULTRASND/TRANSD W/GEL 7X11IN STRL

## (undated) DEVICE — DEV INFL MONARCH 25W

## (undated) DEVICE — PK CATH CARD 10